# Patient Record
Sex: MALE | Race: BLACK OR AFRICAN AMERICAN | NOT HISPANIC OR LATINO | Employment: OTHER | URBAN - METROPOLITAN AREA
[De-identification: names, ages, dates, MRNs, and addresses within clinical notes are randomized per-mention and may not be internally consistent; named-entity substitution may affect disease eponyms.]

---

## 2017-01-04 DIAGNOSIS — I10 ESSENTIAL HYPERTENSION WITH GOAL BLOOD PRESSURE LESS THAN 140/90: ICD-10-CM

## 2017-01-04 DIAGNOSIS — E11.9 TYPE 2 DIABETES MELLITUS WITHOUT COMPLICATION, WITHOUT LONG-TERM CURRENT USE OF INSULIN (H): Primary | ICD-10-CM

## 2017-01-10 NOTE — TELEPHONE ENCOUNTER
Patient is scheduled to rtc 2/24/17, please advise on jareth refill as she would require more than 30 days to get her to her appointment.     EVELINE Hickey, Clinical RN [place].

## 2017-01-11 RX ORDER — METFORMIN HCL 500 MG
1000 TABLET, EXTENDED RELEASE 24 HR ORAL 2 TIMES DAILY WITH MEALS
Qty: 360 TABLET | Refills: 0 | Status: SHIPPED | OUTPATIENT
Start: 2017-01-11 | End: 2017-02-28

## 2017-01-11 RX ORDER — AMLODIPINE AND BENAZEPRIL HYDROCHLORIDE 5; 20 MG/1; MG/1
1 CAPSULE ORAL DAILY
Qty: 90 CAPSULE | Refills: 0 | Status: SHIPPED | OUTPATIENT
Start: 2017-01-11 | End: 2017-02-28

## 2017-02-24 ENCOUNTER — OFFICE VISIT (OUTPATIENT)
Dept: OPHTHALMOLOGY | Facility: CLINIC | Age: 57
End: 2017-02-24
Payer: COMMERCIAL

## 2017-02-24 DIAGNOSIS — H25.13 NUCLEAR SCLEROTIC CATARACT OF BOTH EYES: ICD-10-CM

## 2017-02-24 DIAGNOSIS — H40.1131 PRIMARY OPEN ANGLE GLAUCOMA OF BOTH EYES, MILD STAGE: Primary | ICD-10-CM

## 2017-02-24 PROCEDURE — 92083 EXTENDED VISUAL FIELD XM: CPT | Performed by: STUDENT IN AN ORGANIZED HEALTH CARE EDUCATION/TRAINING PROGRAM

## 2017-02-24 PROCEDURE — 92133 CPTRZD OPH DX IMG PST SGM ON: CPT | Performed by: STUDENT IN AN ORGANIZED HEALTH CARE EDUCATION/TRAINING PROGRAM

## 2017-02-24 PROCEDURE — 92012 INTRM OPH EXAM EST PATIENT: CPT | Performed by: STUDENT IN AN ORGANIZED HEALTH CARE EDUCATION/TRAINING PROGRAM

## 2017-02-24 ASSESSMENT — CUP TO DISC RATIO
OD_RATIO: 0.65 UD
OS_RATIO: 0.7 UD

## 2017-02-24 ASSESSMENT — VISUAL ACUITY
METHOD: SNELLEN - LINEAR
OD_PH_CC: 20/30
OD_CC: 20/50-2
OS_CC: 20/40-1
OD_BAT_HIGH: 20/50
OS_BAT_HIGH: 20/40
OS_PH_CC: 20/30+1

## 2017-02-24 ASSESSMENT — TONOMETRY
OD_IOP_MMHG: 12
IOP_METHOD: ICARE
OS_IOP_MMHG: 11

## 2017-02-24 ASSESSMENT — SLIT LAMP EXAM - LIDS
COMMENTS: NORMAL
COMMENTS: NORMAL

## 2017-02-24 ASSESSMENT — EXTERNAL EXAM - LEFT EYE: OS_EXAM: NORMAL

## 2017-02-24 ASSESSMENT — EXTERNAL EXAM - RIGHT EYE: OD_EXAM: NORMAL

## 2017-02-24 ASSESSMENT — PACHYMETRY
OS_CT(UM): 558
OD_CT(UM): 596

## 2017-02-24 NOTE — PROGRESS NOTES
Current Eye Medications:  Latanoprost nightly both eyes, last drops last night    Subjective:  3 mo glaucoma iop with oct and hvf.  Pt reports that his vision seems a little blurrier. Blurry vision is not bothersome to him. Concerned about his glaucoma - he had a cousin that went blind from glaucoma.     Objective:  See Ophthalmology Exam.       Assessment:  Ren Farrar is a 56 year old male who presents with:   Encounter Diagnoses   Name Primary?     Primary open angle glaucoma of both eyes, mild stage OCT stable both eyes.  Segal visual field (HVF) within normal limits right eye; mild, scattered, non-specific loss left eye  Intraocular pressure great on latanoprost.       Nuclear sclerotic cataract of both eyes Early visually significant. Monitor.       Plan:  Continue same medications    Katherine Danielson MD  [phone]

## 2017-02-24 NOTE — MR AVS SNAPSHOT
After Visit Summary   2/24/2017    Ren Farrar    MRN: [id number]           Patient Information     Date Of Birth          1960        Visit Information        Provider Department      2/24/2017 1:15 PM Katherine Danielson MD [facility]        Today's Diagnoses     Primary open angle glaucoma of both eyes, mild stage    -  1    Nuclear sclerotic cataract of both eyes          Care Instructions    Continue same medications    Katherine Danielson MD  [phone]          Follow-ups after your visit        Follow-up notes from your care team     Return in about 9 months (around 11/24/2017) for Complete Exam.      Your next 10 appointments already scheduled     Feb 28, 2017  9:00 AM CST   [facility]rey [facility]    [address] [place] [id number] [phone]           Bring a current list of meds and any records pertaining to this visit.  For Physicals, please bring immunization records and any forms needing to be filled out.  Please arrive 10 minutes early to complete paperwork.              Who to contact     If you have questions or need follow up information about today's clinic visit or your schedule please contact [facility] directly at [phone].  Normal or non-critical lab and imaging results will be communicated to you by MyChart, letter or phone within 4 business days after the clinic has received the results. If you do not hear from us within 7 days, please contact the clinic through MyChart or phone. If you have a critical or abnormal lab result, we will notify you by phone as soon as possible.  Submit refill requests through [facility] or call your pharmacy and they will forward the refill request to us. Please allow 3 business days for your refill to be completed.          Additional Information About Your Visit        MyChart Information     [employer]  gives you secure access to your electronic health record. If you see a primary care provider, you can also send messages to your care team and make appointments. If you have questions, please call your primary care clinic.  If you do not have a primary care provider, please call [phone] and they will assist you.        Care EveryWhere ID     This is your Care EveryWhere ID. This could be used by other organizations to access your [facility] medical records  [id number]         Blood Pressure from Last 3 Encounters:   04/07/16 132/80   11/10/15 124/86   06/12/15 130/84    Weight from Last 3 Encounters:   04/07/16 106.6 kg (235 lb)   11/10/15 105.7 kg (233 lb)   06/12/15 103.6 kg (228 lb 6.4 oz)              Today, you had the following     No orders found for display       Primary Care Provider Office Phone # Fax #    Humberto Humphrey [phone]       [facility] [address] [facility] [address]        Thank you!     Thank you for choosing [facility]  for your care. Our goal is always to provide you with excellent care. Hearing back from our patients is one way we can continue to improve our services. Please take a few minutes to complete the written survey that you may receive in the mail after your visit with us. Thank you!             Your Updated Medication List - Protect others around you: Learn how to safely use, store and throw away your medicines at www.disposemymeds.org.          This list is accurate as of: 2/24/17  2:24 PM.  Always use your most recent med list.                   Brand Name Dispense Instructions for use    ACCU-CHEK COMPACT GLUCOSE CONT Liqd     1 Bottle    Use calibration fluid as directed by .       amLODIPine-benazepril 5-20 MG per capsule    LOTREL    90 capsule    Take 1 capsule by mouth daily for blood pressure.       aspirin 81 MG tablet      1 TABLET DAILY*       blood glucose monitoring lancets     200 each    1  Device by * route 2 times daily       diazepam 2 MG tablet    VALIUM    40 tablet    Take 1 tablet (2 mg) by mouth every 8 hours as needed for muscle spasms       HYDROcodone-acetaminophen 5-325 MG per tablet    NORCO    40 tablet    Take 1 tablet by mouth every 8 hours as needed for moderate to severe pain (neck or arm)       ibuprofen 200 MG tablet    ADVIL/MOTRIN     Take 3 tablets by mouth 3 times daily as needed for pain (take with food).       latanoprost 0.005 % ophthalmic solution    XALATAN    1 Bottle    Place 1 drop into both eyes At Bedtime       metFORMIN 500 MG 24 hr tablet    GLUCOPHAGE-XR    360 tablet    Take 2 tablets (1,000 mg) by mouth 2 times daily (with meals) for diabetes.       niacin 500 MG CR tablet    NIASPAN    360 tablet    Take 4 tablets (2,000 mg) by mouth At Bedtime for cholesterol. Take with low-fat snack.       * order for DME     6 Container    accu-check compact test drum for testing 2 times a day.       * STATIN NOT PRESCRIBED (INTENTIONAL)      Causes muscle aches       vardenafil 20 MG tablet    LEVITRA    30 tablet    Take 0.5-1 tablets (10-20 mg) by mouth daily as needed for erectile dysfunction       * Notice:  This list has 2 medication(s) that are the same as other medications prescribed for you. Read the directions carefully, and ask your doctor or other care provider to review them with you.

## 2017-02-28 ENCOUNTER — OFFICE VISIT (OUTPATIENT)
Dept: FAMILY MEDICINE | Facility: CLINIC | Age: 57
End: 2017-02-28
Payer: COMMERCIAL

## 2017-02-28 VITALS
BODY MASS INDEX: 31.56 KG/M2 | TEMPERATURE: 99 F | HEIGHT: 72 IN | WEIGHT: 233 LBS | HEART RATE: 79 BPM | DIASTOLIC BLOOD PRESSURE: 73 MMHG | SYSTOLIC BLOOD PRESSURE: 121 MMHG | OXYGEN SATURATION: 97 %

## 2017-02-28 DIAGNOSIS — I10 ESSENTIAL HYPERTENSION WITH GOAL BLOOD PRESSURE LESS THAN 140/90: ICD-10-CM

## 2017-02-28 DIAGNOSIS — E11.9 TYPE 2 DIABETES MELLITUS WITHOUT COMPLICATION, WITHOUT LONG-TERM CURRENT USE OF INSULIN (H): Primary | ICD-10-CM

## 2017-02-28 DIAGNOSIS — M67.431 GANGLION OF WRIST, RIGHT: ICD-10-CM

## 2017-02-28 DIAGNOSIS — Z23 NEED FOR INFLUENZA VACCINATION: ICD-10-CM

## 2017-02-28 DIAGNOSIS — E78.5 HYPERLIPIDEMIA LDL GOAL <100: ICD-10-CM

## 2017-02-28 LAB
ALT SERPL W P-5'-P-CCNC: 21 U/L (ref 0–70)
ANION GAP SERPL CALCULATED.3IONS-SCNC: 8 MMOL/L (ref 3–14)
BUN SERPL-MCNC: 10 MG/DL (ref 7–30)
CALCIUM SERPL-MCNC: 8.9 MG/DL (ref 8.5–10.1)
CHLORIDE SERPL-SCNC: 104 MMOL/L (ref 94–109)
CHOLEST SERPL-MCNC: 161 MG/DL
CO2 SERPL-SCNC: 26 MMOL/L (ref 20–32)
CREAT SERPL-MCNC: 0.94 MG/DL (ref 0.66–1.25)
CREAT UR-MCNC: 204 MG/DL
GFR SERPL CREATININE-BSD FRML MDRD: 82 ML/MIN/1.7M2
GLUCOSE SERPL-MCNC: 177 MG/DL (ref 70–99)
HBA1C MFR BLD: 7.8 % (ref 4.3–6)
HDLC SERPL-MCNC: 72 MG/DL
LDLC SERPL CALC-MCNC: 78 MG/DL
MICROALBUMIN UR-MCNC: 9 MG/L
MICROALBUMIN/CREAT UR: 4.64 MG/G CR (ref 0–17)
NONHDLC SERPL-MCNC: 89 MG/DL
POTASSIUM SERPL-SCNC: 3.9 MMOL/L (ref 3.4–5.3)
SODIUM SERPL-SCNC: 138 MMOL/L (ref 133–144)
TRIGL SERPL-MCNC: 54 MG/DL

## 2017-02-28 PROCEDURE — 82043 UR ALBUMIN QUANTITATIVE: CPT | Performed by: FAMILY MEDICINE

## 2017-02-28 PROCEDURE — 90471 IMMUNIZATION ADMIN: CPT | Performed by: FAMILY MEDICINE

## 2017-02-28 PROCEDURE — 36415 COLL VENOUS BLD VENIPUNCTURE: CPT | Performed by: FAMILY MEDICINE

## 2017-02-28 PROCEDURE — 80048 BASIC METABOLIC PNL TOTAL CA: CPT | Performed by: FAMILY MEDICINE

## 2017-02-28 PROCEDURE — 99207 C FOOT EXAM  NO CHARGE: CPT | Performed by: FAMILY MEDICINE

## 2017-02-28 PROCEDURE — 83036 HEMOGLOBIN GLYCOSYLATED A1C: CPT | Performed by: FAMILY MEDICINE

## 2017-02-28 PROCEDURE — 80061 LIPID PANEL: CPT | Performed by: FAMILY MEDICINE

## 2017-02-28 PROCEDURE — 84460 ALANINE AMINO (ALT) (SGPT): CPT | Performed by: FAMILY MEDICINE

## 2017-02-28 PROCEDURE — 99214 OFFICE O/P EST MOD 30 MIN: CPT | Mod: 25 | Performed by: FAMILY MEDICINE

## 2017-02-28 PROCEDURE — 90686 IIV4 VACC NO PRSV 0.5 ML IM: CPT | Performed by: FAMILY MEDICINE

## 2017-02-28 RX ORDER — NIACIN 500 MG/1
2000 TABLET, EXTENDED RELEASE ORAL AT BEDTIME
Qty: 360 TABLET | Refills: 1 | Status: SHIPPED | OUTPATIENT
Start: 2017-02-28 | End: 2017-10-12

## 2017-02-28 RX ORDER — AMLODIPINE AND BENAZEPRIL HYDROCHLORIDE 5; 20 MG/1; MG/1
1 CAPSULE ORAL DAILY
Qty: 90 CAPSULE | Refills: 1 | Status: SHIPPED | OUTPATIENT
Start: 2017-02-28 | End: 2017-05-04

## 2017-02-28 RX ORDER — METFORMIN HCL 500 MG
1000 TABLET, EXTENDED RELEASE 24 HR ORAL 2 TIMES DAILY WITH MEALS
Qty: 360 TABLET | Refills: 1 | Status: SHIPPED | OUTPATIENT
Start: 2017-02-28 | End: 2017-05-04

## 2017-02-28 ASSESSMENT — PAIN SCALES - GENERAL: PAINLEVEL: NO PAIN (0)

## 2017-02-28 NOTE — PROGRESS NOTES
SUBJECTIVE:                                                    Ren Farrar is a 56 year old male who presents to clinic today for the following health issues:    Diabetes Follow-up    Patient is checking blood sugars: once daily (in the AM, and then sometimes checks again after a meal later in the day).  Results are as follows:         am - 100-130         After meals = about 149    Diabetic concerns: None     Symptoms of hypoglycemia (low blood sugar): none     Paresthesias (numbness or burning in feet) or sores: No     Date of last diabetic eye exam: 11/21/16     Hyperlipidemia Follow-Up      Rate your low fat/cholesterol diet?: fair    Taking statin?  No    Other lipid medications/supplements?:  Niacin, without side effects     Hypertension Follow-up      Outpatient blood pressures are being checked at home.  Results are good.    Low Salt Diet: not monitoring salt         Amount of exercise or physical activity: None    Problems taking medications regularly: No    Medication side effects: none    Diet: low fat/cholesterol      Past medical, family, and social histories, medications, and allergies are reviewed and updated in Epic.       ROS:  M/S: For the past 6 months, patient reports a bump on his right wrist that affects the movement of his wrist. He reports wrist weakness but denies pain.   Constitutional, HEENT, cardiovascular, pulmonary, gi and gu systems are negative, except as otherwise noted.    Any history above obtained by the Medical Assistant was reviewed by Dr. Humberto Humphrey MD, and edited when necessary.    This document serves as a record of the services and decisions personally performed and made by Dr. Humphrey. It was created on his behalf by Krystle Hills, a trained medical scribe. The creation of this document is based on the provider's statements to the medical scribe.  Krystle Hills February 28, 2017 9:35 AM   OBJECTIVE:                                                    /73 (BP  Location: Left arm, Patient Position: Chair, Cuff Size: Adult Large)  Pulse 79  Temp 99  F (37.2  C) (Oral)  Ht 6' (1.829 m)  Wt 233 lb (105.7 kg)  SpO2 97%  BMI 31.6 kg/m2  Body mass index is 31.6 kg/(m^2).  GENERAL: healthy, alert and no distress  EYES: Eyes grossly normal to inspection, PERRL and conjunctivae and sclerae normal  RESP: lungs clear to auscultation - no rales, rhonchi or wheezes  CV: regular rate and rhythm, normal S1 S2, no S3 or S4, no murmur, click or rub, no peripheral edema and peripheral pulses strong  MS: Subcutaneous mass of the medial (ulnar) side of the right wrist. It palpates consistent with a ganglion. It is not tender.   SKIN: no suspicious lesions or rashes  NEURO: Normal strength and tone, mentation intact and speech normal, cranial nerves 2-12 intact   PSYCH: mentation appears normal, affect normal/bright  Diabetic foot exam: normal DP and PT pulses, no trophic changes or ulcerative lesions, normal sensory exam and normal monofilament exam      Diagnostic Test Results:  Results for orders placed or performed in visit on 02/28/17 (from the past 24 hour(s))   Hemoglobin A1c   Result Value Ref Range    Hemoglobin A1C 7.8 (H) 4.3 - 6.0 %     Lab Results   Component Value Date    A1C 7.8 02/28/2017    A1C 7.5 04/07/2016    A1C 6.9 11/10/2015    A1C 6.9 12/16/2014    A1C 7.0 05/20/2014        ASSESSMENT/PLAN:                                                    (E11.9) Type 2 diabetes mellitus without complication, without long-term current use of insulin (H)  (primary encounter diagnosis)  Comment: His diabetes control is not at goal and is actually worse since last visit. The patient has not done much glucose checking besides AM fasting. He would like to work on lifestyle changes more now before starting a second diabetes med, so he wants to put that off until next visit.   Plan: Hemoglobin A1c, Albumin Random Urine         Quantitative, FOOT EXAM, metFORMIN         (GLUCOPHAGE-XR) 500 MG  24 hr tablet        Follow up in 6 months at [facility]. HgbA1c graph provided. Weight graph provided.     (I10) Essential hypertension with goal blood pressure less than 140/90  Comment: well controlled  Plan: Basic metabolic panel, amLODIPine-benazepril         (LOTREL) 5-20 MG per capsule        Follow up in 6 months     (E78.5) Hyperlipidemia LDL goal <100  Comment: fasting. Historically at goal  Plan: Lipid panel reflex to direct LDL, ALT, niacin         (NIASPAN) 500 MG CR tablet        Follow up in 6 months       (M67.431) Ganglion of wrist, right  Comment:   Plan: ORTHO  REFERRAL        Handouts provided.     (Z23) Need for influenza vaccination  Comment: Influenza vaccine offered and accepted by patient. He has received it before without problems.   Plan: HC FLU VAC PRESRV FREE QUAD SPLIT VIR 3+YRS IM              The information in this document, created by the medical scribe for me, accurately reflects the services I personally performed and the decisions made by me. I have reviewed and approved this document for accuracy prior to leaving the patient care area.  Humberto Humphrey MD                              Injectable Influenza Immunization Documentation    1.  Is the person to be vaccinated sick today?  No    2. Does the person to be vaccinated have an allergy to eggs or to a component of the vaccine?  No    3. Has the person to be vaccinated today ever had a serious reaction to influenza vaccine in the past?  No    4. Has the person to be vaccinated ever had Guillain-Austin syndrome?  No     Form completed by GENOVEVA Grijalva MA

## 2017-02-28 NOTE — PATIENT INSTRUCTIONS
================================================================================  Normal Values   Blood pressure  <140/90 for most adults    <130/80 for some chronic diseases (ask your care team about yours)    BMI (body mass index)  18.5-25 kg/m2 (based on height and weight)     Thank you for visiting [facility]    Normal or non-critical lab and imaging results will be communicated to you by MyChart, letter or phone within 7 days.  If you do not hear from us within 10 days, please call the clinic. If you have a critical or abnormal lab result, we will notify you by phone as soon as possible.     If you have any questions regarding your visit please contact:     Team Comfort:   Clinic Hours Telephone Number   Dr. Humberto Cherry 7am-5pm  Monday - Friday [phone]  Yany Shipman RN   Pharmacy 8:00am-8pm Monday-Friday    9am-5pm Saturday-Sunday [phone]   Urgent Care 11am-9pm Monday-Friday        9am-5pm Saturday-Sunday [phone]     After hours, weekend or if you need to make an appointment with your primary provider please call [phone].   After Hours nurse advise: call [facility]: [phone]    Medication Refills:  Call your pharmacy and they will forward the refill to us. Please allow 3 business days for your refills to be completed.          Treating Ganglia  A ganglion is a swelling (cyst) that forms on joint and tendon sheaths. They are most often found in the wrist. But they can also show up on the foot, fingers, or toes. Ganglia are believed to be caused by a rupture of the tissue that lines the joints and tendon sheaths (synovial tissue).   Ganglia are often difficult to treat without surgery but nonsurgical methods may help relieve some of your symptoms.  Nonsurgical care  Nonsurgical methods include:     Pads placed around the ganglion can ease pressure and  friction.    Removing the fluid may also relieve symptoms, though ganglia may come back. The gelatinous gel is removed through a large-bore needle. You may get a steroid injection after the cyst fluid is removed.    Limiting movements or activities that increase pain may bring relief.    Icing the ganglion for 15 to 20 minutes may temporarily relieve inflammation and pain.    If your inflammation is severe, your healthcare provider may treat your symptoms with medicine.    Surgery  If a ganglion is causing ongoing or severe pain, your healthcare provider may recommend surgery. Your surgeon removes the entire ganglion wall during the procedure. He or she may also remove some surrounding tissue. If the ganglion has come through a tear in the capsule of the joint, the tear will have to be repaired. The joint will likely be get still while the tear heals.  After surgery  You may feel pain, swelling, numbness, or tingling for several weeks after surgery. Be sure to see your healthcare provider if you notice any problems in the future. Although surgery is usually successful, there is a chance that the ganglion will come back.    3662-9180 [employer]. [address] [place] [address]. All rights reserved. This information is not intended as a substitute for professional medical care. Always follow your healthcare professional's instructions.        Ganglion Cyst: Hand  A ganglion cyst is a firm, fluid-filled lump that can suddenly appear on the front or back of the wrist or at the base of a finger. These cysts grow from normal tissue in the wrist and fingers, and range in size from a pea to a peach pit. Although ganglion cysts are common, they don t spread, and they don t become cancerous. They can occur after an injury, but many times it isn t known why they grow. Ganglion cysts can change in size, and may go away on their own.  Symptoms  A ganglion cyst is sometimes painful, especially when it  first occurs. Constantly using your hand or wrist can make the cyst enlarge and hurt more. Some hand and wrist movements, such as grasping things, may also be difficult.  How a ganglion cyst develops  Your wrist and hand are made up of many small bones that meet at joints. Tendons attach muscles to the bones at the joints. The tendons allow the joints to bend and straighten. Both tendons and joints are lined with tissue called synovium. This tissue makes a thick fluid that keeps the joints and tendons moving easily. Sometimes the tissue balloons out from the joint or tendons and forms a cyst. As the cyst fills with fluid and grows, it appears as a lump you can feel.  Where ganglion cysts occur  A ganglion cyst can occur anywhere on the hand near a joint. Cysts most commonly appear on the back or palm side of the wrist, or on the palm at the base of a finger. Your doctor can usually diagnose a cyst by examining the lump. He or she may draw off a little fluid or order an X-ray to rule out other problems.  Treating a ganglion cyst  Your healthcare provider may just watch your ganglion cyst. Many shrink and become painless without treatment. Some disappear altogether. If the cyst is unsightly or painful, or makes it hard for you to use your hand, your healthcare provider can treat it or, if needed, remove it surgically.    Nonsurgical treatment  To shrink the cyst, your provider may remove (aspirate) the fluid with a needle. If the cyst hurts, your provider may also give you an injection of an anti-inflammatory, such as cortisone, to relieve the irritation. Your hand may then be wrapped to help keep the cyst from recurring.  Surgery  If the cyst reappears after treatment, your healthcare provider may remove it surgically. A section of the tissue that lines the joint or tendon is removed along with the cyst. This helps prevent another cyst from forming, although recurrence of the cyst is still possible after  surgery. Usually, only your hand or arm is numbed, and you can go home a few hours after surgery. Your hand may be in a splint for several days.    4012-0388 [employer]. [address] [place] [address], [place] [address]. All rights reserved. This information is not intended as a substitute for professional medical care. Always follow your healthcare professional's instructions.

## 2017-02-28 NOTE — NURSING NOTE
Chief Complaint   Patient presents with     Hypertension     Diabetes     Lipids       Initial /73 (BP Location: Left arm, Patient Position: Chair, Cuff Size: Adult Large)  Pulse 79  Temp 99  F (37.2  C) (Oral)  Ht 6' (1.829 m)  Wt 233 lb (105.7 kg)  SpO2 97%  BMI 31.6 kg/m2 Estimated body mass index is 31.6 kg/(m^2) as calculated from the following:    Height as of this encounter: 6' (1.829 m).    Weight as of this encounter: 233 lb (105.7 kg).  Medication Reconciliation: satish Grijalva MA

## 2017-02-28 NOTE — MR AVS SNAPSHOT
After Visit Summary   2/28/2017    Ren Farrar    MRN: [id number]           Patient Information     Date Of Birth          1960        Visit Information        Provider Department      2/28/2017 9:00 AM Humberto Humphrey MD [facility]        Today's Diagnoses     Type 2 diabetes mellitus without complication, without long-term current use of insulin (H)    -  1    Essential hypertension with goal blood pressure less than 140/90        Hyperlipidemia LDL goal <100        Need for influenza vaccination          Care Instructions        ================================================================================  Normal Values   Blood pressure  <140/90 for most adults    <130/80 for some chronic diseases (ask your care team about yours)    BMI (body mass index)  18.5-25 kg/m2 (based on height and weight)     Thank you for visiting [facility]    Normal or non-critical lab and imaging results will be communicated to you by MyChart, letter or phone within 7 days.  If you do not hear from us within 10 days, please call the clinic. If you have a critical or abnormal lab result, we will notify you by phone as soon as possible.     If you have any questions regarding your visit please contact:     Team Comfort:   Clinic Hours Telephone Number   Dr. Humberto Cherry 7am-5pm  Monday - Friday [phone]  Yany Shipman RN   Pharmacy 8:00am-8pm Monday-Friday    9am-5pm Saturday-Sunday [phone]   Urgent Care 11am-9pm Monday-Friday        9am-5pm Saturday-Sunday [phone]     After hours, weekend or if you need to make an appointment with your primary provider please call [phone].   After Hours nurse advise: call [place] [facility]: [phone]    Medication Refills:  Call your pharmacy and they will forward the refill to us. Please allow 3 business days for your refills  to be completed.                Follow-ups after your visit        Follow-up notes from your care team     Return in about 6 months (around 8/24/2017) for full physical, diabetes.      Your next 10 appointments already scheduled     Nov 29, 2017  1:30 PM CST   New Visit with Katherine Danielson MD   [facility] [place] ([facility])    [address]  [place] [id number] [phone]              Who to contact     If you have questions or need follow up information about today's clinic visit or your schedule please contact [facility] directly at [phone].  Normal or non-critical lab and imaging results will be communicated to you by MyChart, letter or phone within 4 business days after the clinic has received the results. If you do not hear from us within 7 days, please contact the clinic through [employer] or phone. If you have a critical or abnormal lab result, we will notify you by phone as soon as possible.  Submit refill requests through [employer] or call your pharmacy and they will forward the refill request to us. Please allow 3 business days for your refill to be completed.          Additional Information About Your Visit        [employer] Information     [employer] gives you secure access to your electronic health record. If you see a primary care provider, you can also send messages to your care team and make appointments. If you have questions, please call your primary care clinic.  If you do not have a primary care provider, please call [phone] and they will assist you.        Care EveryWhere ID     This is your Care EveryWhere ID. This could be used by other organizations to access your [place] medical records  [id number]        Your Vitals Were     Pulse Temperature Height Pulse Oximetry BMI (Body Mass Index)       79 99  F (37.2  C) (Oral) 6' (1.829 m) 97% 31.6 kg/m2        Blood Pressure from Last 3 Encounters:   02/28/17 121/73   04/07/16 132/80    11/10/15 124/86    Weight from Last 3 Encounters:   02/28/17 233 lb (105.7 kg)   04/07/16 235 lb (106.6 kg)   11/10/15 233 lb (105.7 kg)              We Performed the Following     Albumin Random Urine Quantitative     ALT     Basic metabolic panel     FOOT EXAM     HC FLU VAC PRESRV FREE QUAD SPLIT VIR 3+YRS IM     Hemoglobin A1c     Lipid panel reflex to direct LDL          Where to get your medicines      These medications were sent to [facility] [address] [address]     Phone:  [phone]     amLODIPine-benazepril 5-20 MG per capsule    metFORMIN 500 MG 24 hr tablet    niacin 500 MG CR tablet          Primary Care Provider Office Phone # Fax #    Humberto Humphrey [phone]       [facility] [address]  [facility] [address]        Thank you!     Thank you for choosing [facility]  for your care. Our goal is always to provide you with excellent care. Hearing back from our patients is one way we can continue to improve our services. Please take a few minutes to complete the written survey that you may receive in the mail after your visit with us. Thank you!             Your Updated Medication List - Protect others around you: Learn how to safely use, store and throw away your medicines at www.disposemymeds.org.          This list is accurate as of: 2/28/17  9:54 AM.  Always use your most recent med list.                   Brand Name Dispense Instructions for use    amLODIPine-benazepril 5-20 MG per capsule    LOTREL    90 capsule    Take 1 capsule by mouth daily for blood pressure.       aspirin 81 MG tablet      1 TABLET DAILY*       latanoprost 0.005 % ophthalmic solution    XALATAN    1 Bottle    Place 1 drop into both eyes At Bedtime       metFORMIN 500 MG 24 hr tablet    GLUCOPHAGE-XR    360 tablet    Take 2 tablets (1,000 mg) by mouth 2 times daily (with meals)  for diabetes.       niacin 500 MG CR tablet    NIASPAN    360 tablet    Take 4 tablets (2,000 mg) by mouth At Bedtime for cholesterol. Take with low-fat snack.       STATIN NOT PRESCRIBED (INTENTIONAL)      Causes muscle aches

## 2017-04-20 ENCOUNTER — OFFICE VISIT (OUTPATIENT)
Dept: FAMILY MEDICINE | Facility: CLINIC | Age: 57
End: 2017-04-20
Payer: COMMERCIAL

## 2017-04-20 VITALS
HEIGHT: 72 IN | WEIGHT: 236 LBS | TEMPERATURE: 98.5 F | BODY MASS INDEX: 31.97 KG/M2 | SYSTOLIC BLOOD PRESSURE: 131 MMHG | DIASTOLIC BLOOD PRESSURE: 81 MMHG | OXYGEN SATURATION: 97 % | HEART RATE: 89 BPM

## 2017-04-20 DIAGNOSIS — M19.079 ARTHRITIS OF FIRST MTP JOINT: Primary | ICD-10-CM

## 2017-04-20 LAB — URATE SERPL-MCNC: 5.6 MG/DL (ref 3.5–7.2)

## 2017-04-20 PROCEDURE — 99213 OFFICE O/P EST LOW 20 MIN: CPT | Performed by: FAMILY MEDICINE

## 2017-04-20 PROCEDURE — 84550 ASSAY OF BLOOD/URIC ACID: CPT | Performed by: FAMILY MEDICINE

## 2017-04-20 PROCEDURE — 36415 COLL VENOUS BLD VENIPUNCTURE: CPT | Performed by: FAMILY MEDICINE

## 2017-04-20 ASSESSMENT — PAIN SCALES - GENERAL: PAINLEVEL: MILD PAIN (2)

## 2017-04-20 NOTE — NURSING NOTE
Chief Complaint   Patient presents with     Toe Pain       Initial /81 (BP Location: Left arm, Patient Position: Chair, Cuff Size: Adult Small)  Pulse 89  Temp 98.5  F (36.9  C) (Oral)  Ht 6' (1.829 m)  Wt 236 lb (107 kg)  SpO2 97%  BMI 32.01 kg/m2 Estimated body mass index is 32.01 kg/(m^2) as calculated from the following:    Height as of this encounter: 6' (1.829 m).    Weight as of this encounter: 236 lb (107 kg).  Medication Reconciliation: satish Grijalva MA

## 2017-04-20 NOTE — PATIENT INSTRUCTIONS
================================================================================  Normal Values   Blood pressure  <140/90 for most adults    <130/80 for some chronic diseases (ask your care team about yours)    BMI (body mass index)  18.5-25 kg/m2 (based on height and weight)     Thank you for visiting [facility]    Normal or non-critical lab and imaging results will be communicated to you by MyChart, letter or phone within 7 days.  If you do not hear from us within 10 days, please call the clinic. If you have a critical or abnormal lab result, we will notify you by phone as soon as possible.     If you have any questions regarding your visit please contact:     Team Comfort:   Clinic Hours Telephone Number   Dr. Humberto Cherry 7am-5pm  Monday - Friday [phone]  Yany Shipman RN   Pharmacy 8:00am-8pm Monday-Friday    9am-5pm Saturday-Sunday [phone]   Urgent Care 11am-9pm Monday-Friday        9am-5pm Saturday-Sunday [phone]     After hours, weekend or if you need to make an appointment with your primary provider please call [phone].   After Hours nurse advise: call [place] [facility]: [phone]    Medication Refills:  Call your pharmacy and they will forward the refill to us. Please allow 3 business days for your refills to be completed.

## 2017-04-20 NOTE — PROGRESS NOTES
SUBJECTIVE:                                                    Ren Farrar is a 56 year old male who presents to clinic today for the following health issues:      Joint Pain     Onset: about 3 weeks, no injury    Description:   Location: left big toe at the joint  Character: Burning    Intensity: moderate    Progression of Symptoms: worse    Accompanying Signs & Symptoms:  -Worsened when walking     History:   Previous similar pain: no    -Patient reports family history of gout (mother).     Precipitating factors:   Trauma or overuse: no     Alleviating factors:  Improved by: nothing       Therapies Tried and outcome: Tylenol not helping      Past medical, family, and social histories, medications, and allergies are reviewed and updated in Epic.     ROS:  Constitutional, HEENT, cardiovascular, pulmonary, gi and gu systems are negative, except as otherwise noted.      Any history above obtained by the Medical Assistant was reviewed by Dr. Humberto Humphrey MD, and edited when necessary.    This document serves as a record of the services and decisions personally performed and made by Dr. Humphrey. It was created on his behalf by Krystle Hills, a trained medical scribe. The creation of this document is based on the provider's statements to the medical scribe.  Krystle Hills April 20, 2017 2:08 PM   OBJECTIVE:                                                    /81 (BP Location: Left arm, Patient Position: Chair, Cuff Size: Adult Small)  Pulse 89  Temp 98.5  F (36.9  C) (Oral)  Ht 6' (1.829 m)  Wt 236 lb (107 kg)  SpO2 97%  BMI 32.01 kg/m2  Body mass index is 32.01 kg/(m^2).  GENERAL: healthy, alert and no distress  EYES: Eyes grossly normal to inspection, PERRL and conjunctivae and sclerae normal  MS: no gross musculoskeletal defects noted, no edema. Specifically the left great MTP joint does not have any redness, warmth, swelling, tenderness, or pain with passive manipulation.   SKIN: no suspicious  lesions or rashes  NEURO: Normal strength and tone, mentation intact and speech normal, cranial nerves 2-12 intact   PSYCH: mentation appears normal, affect normal/bright      ASSESSMENT/PLAN:                                                    (M19.90) Arthritis of first MTP joint  (primary encounter diagnosis)  Comment: Rule out gout  Plan: Uric acid, ORTHO  REFERRAL, diclofenac        (VOLTAREN) 50 MG EC tablet        Follow up with Podiatry if symptoms worsen or fail to resolve by May.       Follow up in 4 months for KASSIDY and diabetes check.       The information in this document, created by the medical scribe for me, accurately reflects the services I personally performed and the decisions made by me. I have reviewed and approved this document for accuracy prior to leaving the patient care area.    Humberto Humphrey MD

## 2017-04-20 NOTE — MR AVS SNAPSHOT
After Visit Summary   4/20/2017    Ren Farrar    MRN: [id number]           Patient Information     Date Of Birth          1960        Visit Information        Provider Department      4/20/2017 1:40 PM Humberto Humphrey MD [facility]        Today's Diagnoses     Arthritis of first MTP joint    -  1      Care Instructions        ================================================================================  Normal Values   Blood pressure  <140/90 for most adults    <130/80 for some chronic diseases (ask your care team about yours)    BMI (body mass index)  18.5-25 kg/m2 (based on height and weight)     Thank you for visiting [facility]    Normal or non-critical lab and imaging results will be communicated to you by MyChart, letter or phone within 7 days.  If you do not hear from us within 10 days, please call the clinic. If you have a critical or abnormal lab result, we will notify you by phone as soon as possible.     If you have any questions regarding your visit please contact:     Team Comfort:   Clinic Hours Telephone Number   Dr. Humberto Cehrry 7am-5pm  Monday - Friday [phone]  Yany Shipman RN   Pharmacy 8:00am-8pm Monday-Friday    9am-5pm Saturday-Sunday [phone]   Urgent Care 11am-9pm Monday-Friday        9am-5pm Saturday-Sunday [phone]     After hours, weekend or if you need to make an appointment with your primary provider please call [phone].   After Hours nurse advise: call [place] [facility]: [phone]    Medication Refills:  Call your pharmacy and they will forward the refill to us. Please allow 3 business days for your refills to be completed.                Follow-ups after your visit        Additional Services     ORTHO  REFERRAL       [facility] is referring you to the Orthopedic  Services at  [facility].       The  Representative will assist you in the coordination of your Orthopedic and Musculoskeletal Care as prescribed by your physician.    The  Representative will call you within 1 business day to help schedule your appointment, or you may contact the  Representative at:    All areas ~ [phone]     Type of Referral : [facility]       Timeframe requested: Routine (May or later)    Coverage of these services is subject to the terms and limitations of your health insurance plan.  Please call member services at your health plan with any benefit or coverage questions.      If X-rays, CT or MRI's have been performed, please contact the facility where they were done to arrange for , prior to your scheduled appointment.  Please bring this referral request to your appointment and present it to your specialist.                  Follow-up notes from your care team     Return in about 4 months (around 8/24/2017) for full physical, diabetes.      Your next 10 appointments already scheduled     Nov 29, 2017  1:30 PM CST   New Visit with Katherine Danielson MD   [facility] ([facility])    [address] [place] [id number] [phone]              Who to contact     If you have questions or need follow up information about today's clinic visit or your schedule please contact [facility] directly at [phone].  Normal or non-critical lab and imaging results will be communicated to you by MyChart, letter or phone within 4 business days after the clinic has received the results. If you do not hear from us within 7 days, please contact the clinic through MyChart or phone. If you have a critical or abnormal lab result, we will notify you by phone as soon as possible.  Submit refill requests through [employer] or call your pharmacy and they will forward the refill  request to us. Please allow 3 business days for your refill to be completed.          Additional Information About Your Visit        [employer] Information     [employer] gives you secure access to your electronic health record. If you see a primary care provider, you can also send messages to your care team and make appointments. If you have questions, please call your primary care clinic.  If you do not have a primary care provider, please call [phone] and they will assist you.        Care EveryWhere ID     This is your Care EveryWhere ID. This could be used by other organizations to access your [place] medical records  [id number]        Your Vitals Were     Pulse Temperature Height Pulse Oximetry BMI (Body Mass Index)       89 98.5  F (36.9  C) (Oral) 6' (1.829 m) 97% 32.01 kg/m2        Blood Pressure from Last 3 Encounters:   04/20/17 131/81   02/28/17 121/73   04/07/16 132/80    Weight from Last 3 Encounters:   04/20/17 236 lb (107 kg)   02/28/17 233 lb (105.7 kg)   04/07/16 235 lb (106.6 kg)              We Performed the Following     ORTHO  REFERRAL     Uric acid          Today's Medication Changes          These changes are accurate as of: 4/20/17  2:16 PM.  If you have any questions, ask your nurse or doctor.               Start taking these medicines.        Dose/Directions    diclofenac 50 MG EC tablet   Commonly known as:  VOLTAREN   Used for:  Arthritis of first MTP joint   Started by:  Humberto Humphrey MD        Dose:  50 mg   Take 1 tablet (50 mg) by mouth 3 times daily as needed for moderate pain (toe) . Take with food.   Quantity:  60 tablet   Refills:  1            Where to get your medicines      These medications [place] [facility] [place] [place] [address]  [address], [facility] [address]     Phone:  [phone]     diclofenac 50 MG EC tablet                Primary Care Provider Office Phone # Fax #    Humberto Humphrey MD  [phone]       [facility] [address]  [facility] [address]        Thank you!     Thank you for choosing [facility]  for your care. Our goal is always to provide you with excellent care. Hearing back from our patients is one way we can continue to improve our services. Please take a few minutes to complete the written survey that you may receive in the mail after your visit with us. Thank you!             Your Updated Medication List - Protect others around you: Learn how to safely use, store and throw away your medicines at www.disposemymeds.org.          This list is accurate as of: 4/20/17  2:16 PM.  Always use your most recent med list.                   Brand Name Dispense Instructions for use    amLODIPine-benazepril 5-20 MG per capsule    LOTREL    90 capsule    Take 1 capsule by mouth daily for blood pressure.       aspirin 81 MG tablet      1 TABLET DAILY*       diclofenac 50 MG EC tablet    VOLTAREN    60 tablet    Take 1 tablet (50 mg) by mouth 3 times daily as needed for moderate pain (toe) . Take with food.       latanoprost 0.005 % ophthalmic solution    XALATAN    1 Bottle    Place 1 drop into both eyes At Bedtime       metFORMIN 500 MG 24 hr tablet    GLUCOPHAGE-XR    360 tablet    Take 2 tablets (1,000 mg) by mouth 2 times daily (with meals) for diabetes.       niacin 500 MG CR tablet    NIASPAN    360 tablet    Take 4 tablets (2,000 mg) by mouth At Bedtime for cholesterol. Take with low-fat snack.       STATIN NOT PRESCRIBED (INTENTIONAL)      Causes muscle aches

## 2017-04-26 ENCOUNTER — OFFICE VISIT (OUTPATIENT)
Dept: PODIATRY | Facility: CLINIC | Age: 57
End: 2017-04-26
Payer: COMMERCIAL

## 2017-04-26 ENCOUNTER — RADIANT APPOINTMENT (OUTPATIENT)
Dept: GENERAL RADIOLOGY | Facility: CLINIC | Age: 57
End: 2017-04-26
Attending: PODIATRIST
Payer: COMMERCIAL

## 2017-04-26 VITALS — BODY MASS INDEX: 32.01 KG/M2 | WEIGHT: 236 LBS | HEART RATE: 90 BPM | OXYGEN SATURATION: 98 %

## 2017-04-26 DIAGNOSIS — M25.80 SESAMOIDITIS: ICD-10-CM

## 2017-04-26 DIAGNOSIS — M79.672 LEFT FOOT PAIN: ICD-10-CM

## 2017-04-26 DIAGNOSIS — M21.6X9 PRONATION DEFORMITY OF ANKLE, ACQUIRED, UNSPECIFIED LATERALITY: ICD-10-CM

## 2017-04-26 DIAGNOSIS — M79.672 LEFT FOOT PAIN: Primary | ICD-10-CM

## 2017-04-26 PROCEDURE — 99203 OFFICE O/P NEW LOW 30 MIN: CPT | Performed by: PODIATRIST

## 2017-04-26 PROCEDURE — 73630 X-RAY EXAM OF FOOT: CPT | Mod: LT

## 2017-04-26 NOTE — NURSING NOTE
Chief Complaint   Patient presents with     Arthritis     left big toe, getting hard to walk at times       Initial Pulse 90  Wt 107 kg (236 lb)  SpO2 98%  BMI 32.01 kg/m2 Estimated body mass index is 32.01 kg/(m^2) as calculated from the following:    Height as of 4/20/17: 1.829 m (6').    Weight as of this encounter: 107 kg (236 lb).  Medication Reconciliation: complete

## 2017-04-26 NOTE — LETTER
4/26/2017       RE: Ren Farrar  [address]  [facility] [address]           Dear Colleague,    Thank you for referring your patient, Ren Farrar, to the [facility]. Please see a copy of my visit note below.    S:  Patient seen in consult from Dr. Humphrey and complains of left foot pain.  Points to plantar medial sesamoid bone.  Describes as a burning pain.  aggravated by activity and relieved by rest.  Has had this for 3 weeks.  No pain anywhere else on feet.  dress shoes at work and walking 1/2 the time.  Dress shoes old.  Goes barefoot around house.      ROS:  Denies edema, erythema, ecchymosis, numbness, or drainage.  Denies weakness or toe deformity.       Allergies   Allergen Reactions     Atorvastatin Calcium      Myalgias, increased CPK     Simvastatin      myalgias       Current Outpatient Prescriptions   Medication Sig Dispense Refill     diclofenac (VOLTAREN) 50 MG EC tablet Take 1 tablet (50 mg) by mouth 3 times daily as needed for moderate pain (toe) . Take with food. 60 tablet 1     niacin (NIASPAN) 500 MG CR tablet Take 4 tablets (2,000 mg) by mouth At Bedtime for cholesterol. Take with low-fat snack. 360 tablet 1     metFORMIN (GLUCOPHAGE-XR) 500 MG 24 hr tablet Take 2 tablets (1,000 mg) by mouth 2 times daily (with meals) for diabetes. 360 tablet 1     amLODIPine-benazepril (LOTREL) 5-20 MG per capsule Take 1 capsule by mouth daily for blood pressure. 90 capsule 1     latanoprost (XALATAN) 0.005 % ophthalmic solution Place 1 drop into both eyes At Bedtime 1 Bottle 12     STATIN NOT PRESCRIBED, INTENTIONAL, Causes muscle aches       ASPIRIN 81 MG PO TABS 1 TABLET DAILY*  prn       Patient Active Problem List   Diagnosis     DARIA (obstructive sleep apnea)     Type 2 diabetes mellitus without complication (H)     Hyperlipidemia LDL goal <100     Gastroesophageal reflux disease without esophagitis     Erectile dysfunction     Essential hypertension with goal blood  pressure less than 140/90     Pain in joint, shoulder region     Obesity, Class I, BMI 30-34.9     Osteoarthritis of left hip     Nuclear sclerotic cataract of both eyes     Advanced directives, counseling/discussion     Adhesive capsulitis     Shoulder impingement     OAG (open angle glaucoma)     Anemia       Past Medical History:   Diagnosis Date     Arthritis      Erectile dysfunction      Essential hypertension, benign      GERD (gastroesophageal reflux disease)      Glaucoma      Herpes zoster 09    RT T5 or T6     Hyperlipidemia LDL goal <100      DARIA (obstructive sleep apnea) 4/5/10    Dr. Ugo Roth, on CPAP     Posterior subcapsular cataract, bilateral 2013     Type II or unspecified type diabetes mellitus without mention of complication, not stated as uncontrolled 5/13/10       Past Surgical History:   Procedure Laterality Date     NO HISTORY OF SURGERY         Family History   Problem Relation Age of Onset     C.A.D. Mother      Myocardial Infarction Mother      Hypertension Mother      DIABETES Mother      borderline     Genitourinary Problems Mother      dialysis     Eye Disorder Mother      cataract     DIABETES Father      Hypertension Father      Heart Failure Father       CHF 81     DIABETES Sister      Thyroid Disease Sister      Depression Son 17     Asthma No family hx of      CEREBROVASCULAR DISEASE No family hx of      Breast Cancer No family hx of      Cancer - colorectal No family hx of      Prostate Cancer No family hx of      Allergies No family hx of      CANCER No family hx of      Glaucoma No family hx of      Macular Degeneration No family hx of        Social History   Substance Use Topics     Smoking status: Never Smoker     Smokeless tobacco: Never Used     Alcohol use Yes      Comment: occassioanally         O: Pulse 90  Wt 107 kg (236 lb)  SpO2 98%  BMI 32.01 kg/m2.  Patient good historian.  A&O X3.  Pulses DP, PT 2/4 b/l.  CRT < 3 seconds X 10 digits.   No edema or varicosities noted.  Sensation to light touch intact b/l.  Reflexes 2/4 b/l.  Skin has normal texture and turgor b/l.  Pronated arch with weightbearing.  No forefoot or rear foot deformities noted.  MS 5/5 all compartments.  Normal ROM all fore foot and rearfoot joints.  No equinus.  Pain under left medial sesamoid bone.  Negative Lachmans test.    No pain anywhere else.  No erythema, edema, ecchymosis, or subcutaneous masses noted.  X-rays normal     A:   Left medial sesamoiditis.        Bilateral pronation.      P:   X-rays taken today.  Discussed cause with patient.  Ice bid.  Instructed to wear stiff soles shoes at all times.   Dancers pad to offload this.  Rx for orthotics.   Avoid activities that bother this.  Good shoes at all times and I made suggestions.   RETURN TO CLINIC prn.  Thank you for allowing me participate in the care of this patient.        Jordan Taylor DPM, FACFAS      Again, thank you for allowing me to participate in the care of your patient.        Sincerely,              Jordan Taylor DPM

## 2017-04-26 NOTE — PROGRESS NOTES
S:  Patient seen in consult from Dr. Humphrey and complains of left foot pain.  Points to plantar medial sesamoid bone.  Describes as a burning pain.  aggravated by activity and relieved by rest.  Has had this for 3 weeks.  No pain anywhere else on feet.  dress shoes at work and walking 1/2 the time.  Dress shoes old.  Goes barefoot around house.      ROS:  Denies edema, erythema, ecchymosis, numbness, or drainage.  Denies weakness or toe deformity.       Allergies   Allergen Reactions     Atorvastatin Calcium      Myalgias, increased CPK     Simvastatin      myalgias       Current Outpatient Prescriptions   Medication Sig Dispense Refill     diclofenac (VOLTAREN) 50 MG EC tablet Take 1 tablet (50 mg) by mouth 3 times daily as needed for moderate pain (toe) . Take with food. 60 tablet 1     niacin (NIASPAN) 500 MG CR tablet Take 4 tablets (2,000 mg) by mouth At Bedtime for cholesterol. Take with low-fat snack. 360 tablet 1     metFORMIN (GLUCOPHAGE-XR) 500 MG 24 hr tablet Take 2 tablets (1,000 mg) by mouth 2 times daily (with meals) for diabetes. 360 tablet 1     amLODIPine-benazepril (LOTREL) 5-20 MG per capsule Take 1 capsule by mouth daily for blood pressure. 90 capsule 1     latanoprost (XALATAN) 0.005 % ophthalmic solution Place 1 drop into both eyes At Bedtime 1 Bottle 12     STATIN NOT PRESCRIBED, INTENTIONAL, Causes muscle aches       ASPIRIN 81 MG PO TABS 1 TABLET DAILY*  prn       Patient Active Problem List   Diagnosis     DARIA (obstructive sleep apnea)     Type 2 diabetes mellitus without complication (H)     Hyperlipidemia LDL goal <100     Gastroesophageal reflux disease without esophagitis     Erectile dysfunction     Essential hypertension with goal blood pressure less than 140/90     Pain in joint, shoulder region     Obesity, Class I, BMI 30-34.9     Osteoarthritis of left hip     Nuclear sclerotic cataract of both eyes     Advanced directives, counseling/discussion     Adhesive capsulitis     Shoulder  impingement     OAG (open angle glaucoma)     Anemia       Past Medical History:   Diagnosis Date     Arthritis      Erectile dysfunction      Essential hypertension, benign      GERD (gastroesophageal reflux disease)      Glaucoma      Herpes zoster 09    RT T5 or T6     Hyperlipidemia LDL goal <100      DARIA (obstructive sleep apnea) 4/5/10    Dr. Ugo Roth, on CPAP     Posterior subcapsular cataract, bilateral 2013     Type II or unspecified type diabetes mellitus without mention of complication, not stated as uncontrolled 5/13/10       Past Surgical History:   Procedure Laterality Date     NO HISTORY OF SURGERY         Family History   Problem Relation Age of Onset     C.A.D. Mother      Myocardial Infarction Mother      Hypertension Mother      DIABETES Mother      borderline     Genitourinary Problems Mother      dialysis     Eye Disorder Mother      cataract     DIABETES Father      Hypertension Father      Heart Failure Father       CHF 81     DIABETES Sister      Thyroid Disease Sister      Depression Son 17     Asthma No family hx of      CEREBROVASCULAR DISEASE No family hx of      Breast Cancer No family hx of      Cancer - colorectal No family hx of      Prostate Cancer No family hx of      Allergies No family hx of      CANCER No family hx of      Glaucoma No family hx of      Macular Degeneration No family hx of        Social History   Substance Use Topics     Smoking status: Never Smoker     Smokeless tobacco: Never Used     Alcohol use Yes      Comment: occassioanally         O: Pulse 90  Wt 107 kg (236 lb)  SpO2 98%  BMI 32.01 kg/m2.  Patient good historian.  A&O X3.  Pulses DP, PT 2/4 b/l.  CRT < 3 seconds X 10 digits.  No edema or varicosities noted.  Sensation to light touch intact b/l.  Reflexes 2/4 b/l.  Skin has normal texture and turgor b/l.  Pronated arch with weightbearing.  No forefoot or rear foot deformities noted.  MS 5/5 all compartments.  Normal ROM all  fore foot and rearfoot joints.  No equinus.  Pain under left medial sesamoid bone.  Negative Lachmans test.    No pain anywhere else.  No erythema, edema, ecchymosis, or subcutaneous masses noted.  X-rays normal     A:   Left medial sesamoiditis.        Bilateral pronation.      P:   X-rays taken today.  Discussed cause with patient.  Ice bid.  Instructed to wear stiff soles shoes at all times.   Dancers pad to offload this.  Rx for orthotics.   Avoid activities that bother this.  Good shoes at all times and I made suggestions.   RETURN TO CLINIC prn.  Thank you for allowing me participate in the care of this patient.        Jordan Taylor DPM, FACFAS

## 2017-04-26 NOTE — MR AVS SNAPSHOT
After Visit Summary   4/26/2017    Ren Farrar    MRN: [id number]           Patient Information     Date Of Birth          1960        Visit Information        Provider Department      4/26/2017 11:45 AM Jordan Taylor, SARA [facility]        Today's Diagnoses     Left foot pain    -  1    Sesamoiditis        Pronation deformity of ankle, acquired, unspecified laterality          Care Instructions    We wish you continued good healing. If you have any questions or concerns, please do not hesitate to contact us at [phone].      Please remember to call and schedule a follow up appointment if one was recommended at your earliest convenience.   PODIATRY CLINIC HOURS  TELEPHONE NUMBER    Dr. Jordan TOMASPGRAYSON [facility]    Clinics:  [facility]        Venus Vela MA  Medical Assistant  Tuesday 1PM-6PM  [place]  Wednesday 7AM-2PM  [place]  Thursday 10AM-6PM  [place]  Friday 7AM-345PM  [place]  Specialty schedulers:   [phone] to make an appointment with any Specialty Provider.        Urgent Care locations:    [facility] Monday-Friday 5 pm - 9 pm. Saturday-Sunday 9 am -5pm    Monday-Friday 11 am - 9 pm Saturday 9 am - 5 pm     Monday-Sunday 12 noon-8PM [phone]     If you need a medication refill, please contact us you may need lab work and/or a follow up visit prior to your refill (i.e. Antifungal medications).    [employer] (secure e-mail communication and access to your chart) to send a message or to make an appointment.    If MRI needed please call [facility] at [phone]        Weight management plan: Patient was referred to their PCP to discuss a diet and exercise plan.          Follow-ups after your visit        Your next 10 appointments already scheduled     Nov 29, 2017  1:30 PM CST   New Visit with  [facility]atherine [facility]son [facility]   [facility]    [address]  [place] [address] [phone]              Who to contact     If you have questions or need follow up information about today's clinic visit or your schedule please contact [facility] directly at [phone].  Normal or non-critical lab and imaging results will be communicated to you by MyChart, letter or phone within 4 business days after the clinic has received the results. If you do not hear from us within 7 days, please contact the clinic through [employer] or phone. If you have a critical or abnormal lab result, we will notify you by phone as soon as possible.  Submit refill requests through [employer] or call your pharmacy and they will forward the refill request to us. Please allow 3 business days for your refill to be completed.          Additional Information About Your Visit        [employer] Information     [employer] gives you secure access to your electronic health record. If you see a primary care provider, you can also send messages to your care team and make appointments. If you have questions, please call your primary care clinic.  If you do not have a primary care provider, please call [phone] and they will assist you.        Care EveryWhere ID     This is your Care EveryWhere ID. This could be used by other organizations to access your [place] medical records  [id number]        Your Vitals Were     Pulse Pulse Oximetry BMI (Body Mass Index)             90 98% 32.01 kg/m2          Blood Pressure from Last 3 Encounters:   04/20/17 131/81   02/28/17 121/73   04/07/16 132/80    Weight from Last 3 Encounters:   04/26/17 107 kg (236 lb)   04/20/17 107 kg (236 lb)   02/28/17 105.7 kg (233 lb)               Primary Care Provider Office Phone # Fax #    Humbetro Humphrey [phone]       [facility] [address]  [facility] [address]        Thank  you!     Thank you for choosing [facility]  for your care. Our goal is always to provide you with excellent care. Hearing back from our patients is one way we can continue to improve our services. Please take a few minutes to complete the written survey that you may receive in the mail after your visit with us. Thank you!             Your Updated Medication List - Protect others around you: Learn how to safely use, store and throw away your medicines at www.disposemymeds.org.          This list is accurate as of: 4/26/17 12:54 PM.  Always use your most recent med list.                   Brand Name Dispense Instructions for use    amLODIPine-benazepril 5-20 MG per capsule    LOTREL    90 capsule    Take 1 capsule by mouth daily for blood pressure.       aspirin 81 MG tablet      1 TABLET DAILY*       diclofenac 50 MG EC tablet    VOLTAREN    60 tablet    Take 1 tablet (50 mg) by mouth 3 times daily as needed for moderate pain (toe) . Take with food.       latanoprost 0.005 % ophthalmic solution    XALATAN    1 Bottle    Place 1 drop into both eyes At Bedtime       metFORMIN 500 MG 24 hr tablet    GLUCOPHAGE-XR    360 tablet    Take 2 tablets (1,000 mg) by mouth 2 times daily (with meals) for diabetes.       niacin 500 MG CR tablet    NIASPAN    360 tablet    Take 4 tablets (2,000 mg) by mouth At Bedtime for cholesterol. Take with low-fat snack.       STATIN NOT PRESCRIBED (INTENTIONAL)      Causes muscle aches

## 2017-04-26 NOTE — PATIENT INSTRUCTIONS
We wish you continued good healing. If you have any questions or concerns, please do not hesitate to contact us at [phone].      Please remember to call and schedule a follow up appointment if one was recommended at your earliest convenience.   PODIATRY CLINIC HOURS  TELEPHONE NUMBER    Dr. Jordan Taylor D.P.M [facility]    Clinics:  [facility]        Venus Vela MA  Medical Assistant  Tuesday 1PM-6PM  [facility]  Wednesday 7AM-2PM  [place]  Thursday 10AM-6PM  [place] Friday 7AM-345PM  [place]  Specialty schedulers:   [phone] to make an appointment with any Specialty Provider.        Urgent Care locations:    [facility] Monday-Friday 5 pm - 9 pm. Saturday-Sunday 9 am -5pm    Monday-Friday 11 am - 9 pm Saturday 9 am - 5 pm     Monday-Sunday 12 noon-8PM [phone]     If you need a medication refill, please contact us you may need lab work and/or a follow up visit prior to your refill (i.e. Antifungal medications).    [employer] (secure e-mail communication and access to your chart) to send a message or to make an appointment.    If MRI needed please call [facility] at [phone]        Weight management plan: Patient was referred to their PCP to discuss a diet and exercise plan.

## 2017-05-04 ENCOUNTER — TELEPHONE (OUTPATIENT)
Dept: FAMILY MEDICINE | Facility: CLINIC | Age: 57
End: 2017-05-04

## 2017-05-04 DIAGNOSIS — E11.9 TYPE 2 DIABETES MELLITUS WITHOUT COMPLICATION, WITHOUT LONG-TERM CURRENT USE OF INSULIN (H): ICD-10-CM

## 2017-05-04 DIAGNOSIS — I10 ESSENTIAL HYPERTENSION WITH GOAL BLOOD PRESSURE LESS THAN 140/90: ICD-10-CM

## 2017-05-04 NOTE — TELEPHONE ENCOUNTER
Per review of chart, refills of both Amlodipine and Metformin were sent to [facility] on 02/28/2017 for #90 day supply with 1 additional refill.  Below message indicated patient wants prescriptions to go to [facility].    Team - please advise patient to contact [facility] to initiate transfer of Amlodipine and Metformin prescriptions as patient should have refills remaining.  Mickie Garrison RN

## 2017-05-04 NOTE — TELEPHONE ENCOUNTER
Reason for Call:  Medication or medication refill:    Do you use a [facility]?  Name of the pharmacy and phone number for the current request:  [id number] - [place], [place] - [address]    Name of the medication requested: Amlodipine and Metformin    Other request: Please call and inform patient when ready. Thanks.    Can we leave a detailed message on this number? YES    Phone number patient can be reached at: Cell number on file:    Telephone Information:   Mobile [phone]       Best Time: Anytime     Call taken on 5/4/2017 at 4:25 PM by Laura aH

## 2017-05-05 RX ORDER — METFORMIN HCL 500 MG
1000 TABLET, EXTENDED RELEASE 24 HR ORAL 2 TIMES DAILY WITH MEALS
Qty: 360 TABLET | Refills: 0 | Status: SHIPPED | OUTPATIENT
Start: 2017-05-05 | End: 2017-08-02

## 2017-05-05 RX ORDER — AMLODIPINE AND BENAZEPRIL HYDROCHLORIDE 5; 20 MG/1; MG/1
1 CAPSULE ORAL DAILY
Qty: 90 CAPSULE | Refills: 0 | Status: SHIPPED | OUTPATIENT
Start: 2017-05-05 | End: 2017-08-02

## 2017-05-05 NOTE — TELEPHONE ENCOUNTER
RN called [facility] and was unable to speak to a person directly.  RN huddled with provider and received permission to send the remaining refill to [facility].  Prescription sent.  Patient notified.    Ramonita Ness RN

## 2017-05-05 NOTE — TELEPHONE ENCOUNTER
Called and spoke with patient, informed him of nurse's message. Patient states that he had been calling [facility] for days and no one is answering his calls. He did go to [facility] to have them transfer his medications from [facility], but [facility] is also having a hard time getting a hold of [facility]. Patient asking if provider can just send prescription to [facility].    Trish Liz MA

## 2017-08-02 DIAGNOSIS — E11.9 TYPE 2 DIABETES MELLITUS WITHOUT COMPLICATION, WITHOUT LONG-TERM CURRENT USE OF INSULIN (H): ICD-10-CM

## 2017-08-02 DIAGNOSIS — I10 ESSENTIAL HYPERTENSION WITH GOAL BLOOD PRESSURE LESS THAN 140/90: ICD-10-CM

## 2017-08-02 NOTE — TELEPHONE ENCOUNTER
amLODIPine-benazepril (LOTREL) 5-20 MG per capsule      Last Written Prescription Date: 5/5/17  Last Fill Quantity: 90, # refills: 0    Last Office Visit with [facility], [facility] or [facility] prescribing provider:  4/20/17   Future Office Visit:        BP Readings from Last 3 Encounters:   04/20/17 131/81   02/28/17 121/73   04/07/16 132/80           Caio Faarax  [facility]

## 2017-08-03 NOTE — TELEPHONE ENCOUNTER
metFORMIN (GLUCOPHAGE-XR) 500 MG 24 hr tablet         Last Written Prescription Date: 5/5/17  Last Fill Quantity: 360, # refills: 0  Last Office Visit with G, [facility] or [facility] prescribing provider:  4/20/17        BP Readings from Last 3 Encounters:   04/20/17 131/81   02/28/17 121/73   04/07/16 132/80     Lab Results   Component Value Date    MICROL 9 02/28/2017     Lab Results   Component Value Date    UMALCR 4.64 02/28/2017     Creatinine   Date Value Ref Range Status   02/28/2017 0.94 0.66 - 1.25 mg/dL Final   ]  GFR Estimate   Date Value Ref Range Status   02/28/2017 82 >60 mL/min/1.7m2 Final     Comment:     Non  GFR Calc   04/07/2016 >90  Non  GFR Calc   >60 mL/min/1.7m2 Final   11/10/2015 89 >60 mL/min/1.7m2 Final     Comment:     Non  GFR Calc     GFR Estimate If Black   Date Value Ref Range Status   02/28/2017 >90   GFR Calc   >60 mL/min/1.7m2 Final   04/07/2016 >90   GFR Calc   >60 mL/min/1.7m2 Final   11/10/2015 >90   GFR Calc   >60 mL/min/1.7m2 Final     Lab Results   Component Value Date    CHOL 161 02/28/2017     Lab Results   Component Value Date    HDL 72 02/28/2017     Lab Results   Component Value Date    LDL 78 02/28/2017     Lab Results   Component Value Date    TRIG 54 02/28/2017     Lab Results   Component Value Date    CHOLHDLRATIO 2.1 11/10/2015     No results found for: AST  Lab Results   Component Value Date    ALT 21 02/28/2017     Lab Results   Component Value Date    A1C 7.8 02/28/2017    A1C 7.5 04/07/2016    A1C 6.9 11/10/2015    A1C 6.9 12/16/2014    A1C 7.0 05/20/2014     Potassium   Date Value Ref Range Status   02/28/2017 3.9 3.4 - 5.3 mmol/L Final           Caio Faarax  [facility]

## 2017-08-06 RX ORDER — METFORMIN HCL 500 MG
TABLET, EXTENDED RELEASE 24 HR ORAL
Qty: 120 TABLET | Refills: 0 | Status: SHIPPED | OUTPATIENT
Start: 2017-08-06 | End: 2017-09-11

## 2017-08-06 RX ORDER — AMLODIPINE AND BENAZEPRIL HYDROCHLORIDE 5; 20 MG/1; MG/1
CAPSULE ORAL
Qty: 90 CAPSULE | Refills: 0 | Status: SHIPPED | OUTPATIENT
Start: 2017-08-06 | End: 2017-10-12

## 2017-08-06 NOTE — TELEPHONE ENCOUNTER
Medication is being filled for 1 time refill only. Patient needs to be seen for office visit around 8/28/17.    Esau Kern RN

## 2017-09-11 DIAGNOSIS — E11.9 TYPE 2 DIABETES MELLITUS WITHOUT COMPLICATION, WITHOUT LONG-TERM CURRENT USE OF INSULIN (H): ICD-10-CM

## 2017-09-11 NOTE — TELEPHONE ENCOUNTER
metFORMIN (GLUCOPHAGE-XR) 500 MG 24 hr tablet         Last Written Prescription Date: 8/6/17  Last Fill Quantity: 120, # refills: 0  Last Office Visit with G, [facility] or [facility] prescribing provider:  4/20/17        BP Readings from Last 3 Encounters:   04/20/17 131/81   02/28/17 121/73   04/07/16 132/80     Lab Results   Component Value Date    MICROL 9 02/28/2017     Lab Results   Component Value Date    UMALCR 4.64 02/28/2017     Creatinine   Date Value Ref Range Status   02/28/2017 0.94 0.66 - 1.25 mg/dL Final   ]  GFR Estimate   Date Value Ref Range Status   02/28/2017 82 >60 mL/min/1.7m2 Final     Comment:     Non  GFR Calc   04/07/2016 >90  Non  GFR Calc   >60 mL/min/1.7m2 Final   11/10/2015 89 >60 mL/min/1.7m2 Final     Comment:     Non  GFR Calc     GFR Estimate If Black   Date Value Ref Range Status   02/28/2017 >90   GFR Calc   >60 mL/min/1.7m2 Final   04/07/2016 >90   GFR Calc   >60 mL/min/1.7m2 Final   11/10/2015 >90   GFR Calc   >60 mL/min/1.7m2 Final     Lab Results   Component Value Date    CHOL 161 02/28/2017     Lab Results   Component Value Date    HDL 72 02/28/2017     Lab Results   Component Value Date    LDL 78 02/28/2017     Lab Results   Component Value Date    TRIG 54 02/28/2017     Lab Results   Component Value Date    CHOLHDLRATIO 2.1 11/10/2015     No results found for: AST  Lab Results   Component Value Date    ALT 21 02/28/2017     Lab Results   Component Value Date    A1C 7.8 02/28/2017    A1C 7.5 04/07/2016    A1C 6.9 11/10/2015    A1C 6.9 12/16/2014    A1C 7.0 05/20/2014     Potassium   Date Value Ref Range Status   02/28/2017 3.9 3.4 - 5.3 mmol/L Final         [facility]

## 2017-09-13 RX ORDER — METFORMIN HCL 500 MG
TABLET, EXTENDED RELEASE 24 HR ORAL
Qty: 120 TABLET | Refills: 0 | Status: SHIPPED | OUTPATIENT
Start: 2017-09-13 | End: 2017-10-12

## 2017-09-14 DIAGNOSIS — H40.10X0 OPEN-ANGLE GLAUCOMA OF BOTH EYES, UNSPECIFIED GLAUCOMA STAGE, UNSPECIFIED OPEN-ANGLE GLAUCOMA TYPE: ICD-10-CM

## 2017-09-14 RX ORDER — LATANOPROST 50 UG/ML
1 SOLUTION/ DROPS OPHTHALMIC AT BEDTIME
Qty: 1 BOTTLE | Refills: 12 | Status: SHIPPED | OUTPATIENT
Start: 2017-09-14 | End: 2018-02-15

## 2017-10-11 DIAGNOSIS — E11.9 TYPE 2 DIABETES MELLITUS WITHOUT COMPLICATION, WITHOUT LONG-TERM CURRENT USE OF INSULIN (H): ICD-10-CM

## 2017-10-11 RX ORDER — METFORMIN HCL 500 MG
TABLET, EXTENDED RELEASE 24 HR ORAL
Qty: 120 TABLET | Refills: 0 | Status: CANCELLED | OUTPATIENT
Start: 2017-10-11

## 2017-10-11 NOTE — TELEPHONE ENCOUNTER
metFORMIN (GLUCOPHAGE-XR) 500 MG 24 hr tablet         Last Written Prescription Date: 09/13/17  Last Fill Quantity: 120, # refills: 0  Last Office Visit with [facility], [facility] or [facility] prescribing provider:  04/20/17     Next 5 appointments (look out 90 days)     Oct 12, 2017 12:00 PM CDT   PHYSICAL with Humberto Humphrey MD   [facility]    [address] [place] [address] [phone]                   BP Readings from Last 3 Encounters:   04/20/17 131/81   02/28/17 121/73   04/07/16 132/80     Lab Results   Component Value Date    MICROL 9 02/28/2017     Lab Results   Component Value Date    UMALCR 4.64 02/28/2017     Creatinine   Date Value Ref Range Status   02/28/2017 0.94 0.66 - 1.25 mg/dL Final   ]  GFR Estimate   Date Value Ref Range Status   02/28/2017 82 >60 mL/min/1.7m2 Final     Comment:     Non  GFR Calc   04/07/2016 >90  Non  GFR Calc   >60 mL/min/1.7m2 Final   11/10/2015 89 >60 mL/min/1.7m2 Final     Comment:     Non  GFR Calc     GFR Estimate If Black   Date Value Ref Range Status   02/28/2017 >90   GFR Calc   >60 mL/min/1.7m2 Final   04/07/2016 >90   GFR Calc   >60 mL/min/1.7m2 Final   11/10/2015 >90   GFR Calc   >60 mL/min/1.7m2 Final     Lab Results   Component Value Date    CHOL 161 02/28/2017     Lab Results   Component Value Date    HDL 72 02/28/2017     Lab Results   Component Value Date    LDL 78 02/28/2017     Lab Results   Component Value Date    TRIG 54 02/28/2017     Lab Results   Component Value Date    CHOLHDLRATIO 2.1 11/10/2015     No results found for: AST  Lab Results   Component Value Date    ALT 21 02/28/2017     Lab Results   Component Value Date    A1C 7.8 02/28/2017    A1C 7.5 04/07/2016    A1C 6.9 11/10/2015    A1C 6.9 12/16/2014    A1C 7.0 05/20/2014     Potassium   Date Value Ref Range Status   02/28/2017 3.9 3.4  - 5.3 mmol/L Final         Michelle Colon  [facility]

## 2017-10-12 ENCOUNTER — OFFICE VISIT (OUTPATIENT)
Dept: FAMILY MEDICINE | Facility: CLINIC | Age: 57
End: 2017-10-12
Payer: COMMERCIAL

## 2017-10-12 VITALS
WEIGHT: 235 LBS | HEART RATE: 85 BPM | OXYGEN SATURATION: 97 % | BODY MASS INDEX: 31.83 KG/M2 | DIASTOLIC BLOOD PRESSURE: 75 MMHG | SYSTOLIC BLOOD PRESSURE: 126 MMHG | TEMPERATURE: 99.2 F | HEIGHT: 72 IN

## 2017-10-12 DIAGNOSIS — M19.079 ARTHRITIS OF FIRST MTP JOINT: ICD-10-CM

## 2017-10-12 DIAGNOSIS — Z23 NEED FOR PROPHYLACTIC VACCINATION AND INOCULATION AGAINST INFLUENZA: ICD-10-CM

## 2017-10-12 DIAGNOSIS — E78.5 HYPERLIPIDEMIA LDL GOAL <100: ICD-10-CM

## 2017-10-12 DIAGNOSIS — Z00.00 ENCOUNTER FOR ROUTINE ADULT HEALTH EXAMINATION WITHOUT ABNORMAL FINDINGS: Primary | ICD-10-CM

## 2017-10-12 DIAGNOSIS — E11.9 TYPE 2 DIABETES MELLITUS WITHOUT COMPLICATION, WITHOUT LONG-TERM CURRENT USE OF INSULIN (H): ICD-10-CM

## 2017-10-12 DIAGNOSIS — I10 ESSENTIAL HYPERTENSION WITH GOAL BLOOD PRESSURE LESS THAN 140/90: ICD-10-CM

## 2017-10-12 LAB
ALT SERPL W P-5'-P-CCNC: 24 U/L (ref 0–70)
CHOLEST SERPL-MCNC: 159 MG/DL
CREAT SERPL-MCNC: 0.95 MG/DL (ref 0.66–1.25)
GFR SERPL CREATININE-BSD FRML MDRD: 81 ML/MIN/1.7M2
HBA1C MFR BLD: 7.1 % (ref 4.3–6)
HDLC SERPL-MCNC: 67 MG/DL
LDLC SERPL CALC-MCNC: 83 MG/DL
NONHDLC SERPL-MCNC: 92 MG/DL
POTASSIUM SERPL-SCNC: 4 MMOL/L (ref 3.4–5.3)
T4 FREE SERPL-MCNC: 1.18 NG/DL (ref 0.76–1.46)
TRIGL SERPL-MCNC: 46 MG/DL
TSH SERPL DL<=0.005 MIU/L-ACNC: 0.27 MU/L (ref 0.4–4)

## 2017-10-12 PROCEDURE — 90686 IIV4 VACC NO PRSV 0.5 ML IM: CPT | Performed by: FAMILY MEDICINE

## 2017-10-12 PROCEDURE — 84439 ASSAY OF FREE THYROXINE: CPT | Performed by: FAMILY MEDICINE

## 2017-10-12 PROCEDURE — 84460 ALANINE AMINO (ALT) (SGPT): CPT | Performed by: FAMILY MEDICINE

## 2017-10-12 PROCEDURE — 99396 PREV VISIT EST AGE 40-64: CPT | Mod: 25 | Performed by: FAMILY MEDICINE

## 2017-10-12 PROCEDURE — 82565 ASSAY OF CREATININE: CPT | Performed by: FAMILY MEDICINE

## 2017-10-12 PROCEDURE — 84443 ASSAY THYROID STIM HORMONE: CPT | Performed by: FAMILY MEDICINE

## 2017-10-12 PROCEDURE — 84132 ASSAY OF SERUM POTASSIUM: CPT | Performed by: FAMILY MEDICINE

## 2017-10-12 PROCEDURE — 90471 IMMUNIZATION ADMIN: CPT | Performed by: FAMILY MEDICINE

## 2017-10-12 PROCEDURE — 80061 LIPID PANEL: CPT | Performed by: FAMILY MEDICINE

## 2017-10-12 PROCEDURE — 36415 COLL VENOUS BLD VENIPUNCTURE: CPT | Performed by: FAMILY MEDICINE

## 2017-10-12 PROCEDURE — 83036 HEMOGLOBIN GLYCOSYLATED A1C: CPT | Performed by: FAMILY MEDICINE

## 2017-10-12 RX ORDER — NIACIN 500 MG/1
2000 TABLET, EXTENDED RELEASE ORAL AT BEDTIME
Qty: 360 TABLET | Refills: 1 | Status: SHIPPED | OUTPATIENT
Start: 2017-10-12 | End: 2018-03-29

## 2017-10-12 RX ORDER — METFORMIN HCL 500 MG
TABLET, EXTENDED RELEASE 24 HR ORAL
Qty: 120 TABLET | Refills: 0 | Status: SHIPPED | OUTPATIENT
Start: 2017-10-12 | End: 2017-11-09

## 2017-10-12 RX ORDER — AMLODIPINE AND BENAZEPRIL HYDROCHLORIDE 5; 20 MG/1; MG/1
CAPSULE ORAL
Qty: 90 CAPSULE | Refills: 0 | Status: SHIPPED | OUTPATIENT
Start: 2017-10-12 | End: 2018-02-17

## 2017-10-12 ASSESSMENT — PAIN SCALES - GENERAL: PAINLEVEL: NO PAIN (0)

## 2017-10-12 NOTE — MR AVS SNAPSHOT
After Visit Summary   10/12/2017    Ren Farrar    MRN: [id number]           Patient Information     Date Of Birth          1960        Visit Information        Provider Department      10/12/2017 12:00 PM Humberto Humphrey MD [facility]        Today's Diagnoses     Encounter for routine adult health examination without abnormal findings    -  1    Type 2 diabetes mellitus without complication, without long-term current use of insulin (H)        Hyperlipidemia LDL goal <100        Essential hypertension with goal blood pressure less than 140/90        Arthritis of first MTP joint        Need for prophylactic vaccination and inoculation against influenza          Care Instructions        ================================================================================  Normal Values   Blood pressure  <140/90 for most adults    <130/80 for some chronic diseases (ask your care team about yours)    BMI (body mass index)  18.5-25 kg/m2 (based on height and weight)     Thank you for visiting [facility]    Normal or non-critical lab and imaging results will be communicated to you by MyChart, letter or phone within 7 days.  If you do not hear from us within 10 days, please call the clinic. If you have a critical or abnormal lab result, we will notify you by phone as soon as possible.     If you have any questions regarding your visit please contact:     Team Comfort:   Clinic Hours Telephone Number   Dr. Humberto Cherry 7am-5pm  Monday - Friday [phone]  Yany RN  Sheyla RN  Ramonita RN   Pharmacy 8:00am-8pm Monday-Friday    9am-5pm Saturday-Sunday [phone]   Urgent Care 11am-9pm Monday-Friday        9am-5pm Saturday-Sunday [phone]     After hours, weekend or if you need to make an appointment with your primary provider please call [phone].   After Hours nurse advise: call [facility]:  [phone]    Medication Refills:  Call your pharmacy and they will forward the refill to us. Please allow 3 business days for your refills to be completed.          Preventive Health Recommendations  Male Ages 50 - 64    Yearly exam:             See your health care provider every year in order to  o   Review health changes.   o   Discuss preventive care.    o   Review your medicines if your doctor has prescribed any.     Have a cholesterol test every 5 years, or more frequently if you are at risk for high cholesterol/heart disease.     Have a diabetes test (fasting glucose) every three years. If you are at risk for diabetes, you should have this test more often.     Have a colonoscopy at age 50, or have a yearly FIT test (stool test). These exams will check for colon cancer.      Talk with your health care provider about whether or not a prostate cancer screening test (PSA) is right for you.    You should be tested each year for STDs (sexually transmitted diseases), if you re at risk.     Shots: Get a flu shot each year. Get a tetanus shot every 10 years.     Nutrition:    Eat at least 5 servings of fruits and vegetables daily.     Eat whole-grain bread, whole-wheat pasta and brown rice instead of white grains and rice.     Talk to your provider about Calcium and Vitamin D.     Lifestyle    Exercise for at least 150 minutes a week (30 minutes a day, 5 days a week). This will help you control your weight and prevent disease.     Limit alcohol to one drink per day.     No smoking.     Wear sunscreen to prevent skin cancer.     See your dentist every six months for an exam and cleaning.     See your eye doctor every 1 to 2 years.            Follow-ups after your visit        Follow-up notes from your care team     Return in about 5 months (around 3/23/2018) for diabetes.      Your next 10 appointments already scheduled     Nov 29, 2017  1:30 PM CST   New Visit with Katherine Danielson MD   [facility]  ([facility])    [address]  [place] [address] [phone]              Who to contact     If you have questions or need follow up information about today's clinic visit or your schedule please contact [facility] directly at [phone].  Normal or non-critical lab and imaging results will be communicated to you by MyChart, letter or phone within 4 business days after the clinic has received the results. If you do not hear from us within 7 days, please contact the clinic through MyChart or phone. If you have a critical or abnormal lab result, we will notify you by phone as soon as possible.  Submit refill requests through [employer] or call your pharmacy and they will forward the refill request to us. Please allow 3 business days for your refill to be completed.          Additional Information About Your Visit        MyChart Information     [employer] gives you secure access to your electronic health record. If you see a primary care provider, you can also send messages to your care team and make appointments. If you have questions, please call your primary care clinic.  If you do not have a primary care provider, please call [phone] and they will assist you.        Care EveryWhere ID     This is your Care EveryWhere ID. This could be used by other organizations to access your [facility] medical records  [id number]        Your Vitals Were     Pulse Temperature Height Pulse Oximetry BMI (Body Mass Index)       85 99.2  F (37.3  C) (Oral) 6' (1.829 m) 97% 31.87 kg/m2        Blood Pressure from Last 3 Encounters:   10/12/17 126/75   04/20/17 131/81   02/28/17 121/73    Weight from Last 3 Encounters:   10/12/17 235 lb (106.6 kg)   04/26/17 236 lb (107 kg)   04/20/17 236 lb (107 kg)              We Performed the Following     ALT     Creatinine     HC FLU VAC PRESRV FREE QUAD SPLIT VIR 3+YRS IM     Hemoglobin A1c     Lipid panel reflex to direct LDL     Potassium     TSH  with free T4 reflex          Today's Medication Changes          These changes are accurate as of: 10/12/17  1:12 PM.  If you have any questions, ask your nurse or doctor.               These medicines have changed or have updated prescriptions.        Dose/Directions    amLODIPine-benazepril 5-20 MG per capsule   Commonly known as:  LOTREL   This may have changed:  See the new instructions.   Used for:  Essential hypertension with goal blood pressure less than 140/90   Changed by:  Humberto Humphrey MD        TAKE 1 CAPSULE BY MOUTH DAILY FOR BLOOD PRESSURE.   Quantity:  90 capsule   Refills:  0       metFORMIN 500 MG 24 hr tablet   Commonly known as:  GLUCOPHAGE-XR   This may have changed:  See the new instructions.   Used for:  Type 2 diabetes mellitus without complication, without long-term current use of insulin (H)   Changed by:  Humberto Humphrey MD        TAKE 2 TABLETS (1,000 MG) BY MOUTH 2 TIMES DAILY (WITH MEALS) FOR DIABETES.   Quantity:  120 tablet   Refills:  0            Where to get your medicines      These medications were sent to [facility]/pharmacy #[id number] - [place], [place] - [address]  [address] [facility] [address]     Phone:  [phone]     amLODIPine-benazepril 5-20 MG per capsule    diclofenac 50 MG EC tablet    metFORMIN 500 MG 24 hr tablet    niacin 500 MG CR tablet                Primary Care Provider Office Phone # Fax #    Humberto Humphrey [phone]       [address]  Health [facility] [address]        Equal Access to Services     R[facility] Unit[facility] AH: Hadii anushka ku hadasho Soomaali, waaxda luqadaha, qaybta kaalmada adeegyada, waxay jayy berg . So [facility] [phone].    ATENCIÓN: Si habla español, tiene a ortega disposición servicios gratuitos de asistencia lingüística. Llame al [phone].    We comply with applicable federal civil rights laws and [place] laws. We do not discriminate on the basis of race, color, national origin,  age, disability, sex, sexual orientation, or gender identity.            Thank you!     Thank you for choosing [facility]  for your care. Our goal is always to provide you with excellent care. Hearing back from our patients is one way we can continue to improve our services. Please take a few minutes to complete the written survey that you may receive in the mail after your visit with us. Thank you!             Your Updated Medication List - Protect others around you: Learn how to safely use, store and throw away your medicines at www.disposemymeds.org.          This list is accurate as of: 10/12/17  1:12 PM.  Always use your most recent med list.                   Brand Name Dispense Instructions for use Diagnosis    amLODIPine-benazepril 5-20 MG per capsule    LOTREL    90 capsule    TAKE 1 CAPSULE BY MOUTH DAILY FOR BLOOD PRESSURE.    Essential hypertension with goal blood pressure less than 140/90       aspirin 81 MG tablet      1 TABLET DAILY*        diclofenac 50 MG EC tablet    VOLTAREN    60 tablet    Take 1 tablet (50 mg) by mouth 3 times daily as needed for moderate pain (toe) . Take with food.    Arthritis of first MTP joint       latanoprost 0.005 % ophthalmic solution    XALATAN    1 Bottle    Place 1 drop into both eyes At Bedtime    Open-angle glaucoma of both eyes, unspecified glaucoma stage, unspecified open-angle glaucoma type       metFORMIN 500 MG 24 hr tablet    GLUCOPHAGE-XR    120 tablet    TAKE 2 TABLETS (1,000 MG) BY MOUTH 2 TIMES DAILY (WITH MEALS) FOR DIABETES.    Type 2 diabetes mellitus without complication, without long-term current use of insulin (H)       niacin 500 MG CR tablet    NIASPAN    360 tablet    Take 4 tablets (2,000 mg) by mouth At Bedtime for cholesterol. Take with low-fat snack.    Hyperlipidemia LDL goal <100       STATIN NOT PRESCRIBED (INTENTIONAL)      Causes muscle aches

## 2017-10-12 NOTE — PROGRESS NOTES
Injectable Influenza Immunization Documentation    1.  Is the person to be vaccinated sick today?   No    2. Does the person to be vaccinated have an allergy to a component   of the vaccine?   No    3. Has the person to be vaccinated ever had a serious reaction   to influenza vaccine in the past?   No    4. Has the person to be vaccinated ever had Guillain-Barré syndrome?   No    Form completed by GENOVEVA Grijalva MA

## 2017-10-12 NOTE — NURSING NOTE
Chief Complaint   Patient presents with     Hypertension     Diabetes     Lipids       Initial /75 (BP Location: Left arm, Patient Position: Chair, Cuff Size: Adult Large)  Pulse 85  Temp 99.2  F (37.3  C) (Oral)  Ht 6' (1.829 m)  Wt 235 lb (106.6 kg)  SpO2 97%  BMI 31.87 kg/m2 Estimated body mass index is 31.87 kg/(m^2) as calculated from the following:    Height as of this encounter: 6' (1.829 m).    Weight as of this encounter: 235 lb (106.6 kg).  Medication Reconciliation: satish Grijalva MA

## 2017-10-12 NOTE — PROGRESS NOTES
SUBJECTIVE:   CC: Ren Farrar is an 57 year old male who presents for preventative health visit and a follow up on his diabetes.     Healthy Habits:    Do you get at least three servings of calcium containing foods daily (dairy, green leafy vegetables, etc.)? no    Amount of exercise or daily activities, outside of work: He used to go to [employer], but since having foot pain over the past 3 months, he has not gone.    Problems taking medications regularly No    Medication side effects: No    Have you had an eye exam in the past two years? yes    Do you see a dentist twice per year? yes    Do you have sleep apnea, excessive snoring or daytime drowsiness?yes - sleep apnea    Diabetes Follow-up      Patient is checking blood sugars: rarely.      Diabetic concerns: None     Symptoms of hypoglycemia (low blood sugar): none     Paresthesias (numbness or burning in feet) or sores: No     Date of last diabetic eye exam: 11/21/16    Hyperlipidemia Follow-Up      Rate your low fat/cholesterol diet?: fair    Taking statin?  No    Other lipid medications/supplements?:  Niacin, without side effects    Hypertension Follow-up      Outpatient blood pressures are being checked at home.  Results are good.    Low Salt Diet: not monitoring salt        Amount of exercise or physical activity: None    Problems taking medications regularly: No    Medication side effects: none  Diet: low fat/cholesterol    Today's PHQ-2 Score:   PHQ-2 ( 1999 Pfizer) 4/20/2017 4/7/2016   Q1: Little interest or pleasure in doing things 0 0   Q2: Feeling down, depressed or hopeless 0 0   PHQ-2 Score 0 0         Abuse: Current or Past(Physical, Sexual or Emotional)- No  Do you feel safe in your environment - Yes  Social History   Substance Use Topics     Smoking status: Never Smoker     Smokeless tobacco: Never Used     Alcohol use Yes      Comment: occassioanally     The patient does not drink >3 drinks per day nor >7 drinks per week.    Last PSA:   PSA    Date Value Ref Range Status   11/01/2011 0.63 0 - 4 ug/L Final       Past medical, family, and social histories, medications, and allergies are reviewed and updated in Epic.     ROS:  C: NEGATIVE for fever, chills, change in weight  I: NEGATIVE for worrisome rashes, moles or lesions  E: NEGATIVE for vision changes or irritation  ENT: NEGATIVE for ear, mouth and throat problems  R: NEGATIVE for significant cough or SOB  CV: NEGATIVE for chest pain, palpitations or peripheral edema  GI: NEGATIVE for nausea, abdominal pain, heartburn, or change in bowel habits   male: negative for dysuria, hematuria, decreased urinary stream, erectile dysfunction, urethral discharge  M: Patient notes foot pain over the past 3 months. He has seen a podiatrist for this issue, who recommended specific shoes to help his foot heal.   N: NEGATIVE for weakness, dizziness or paresthesias  P: NEGATIVE for changes in mood or affect    Any history above obtained by the Medical Assistant was reviewed by Dr. Humberto Humphrey MD, and edited when necessary.    This document serves as a record of the services and decisions personally performed and made by Dr. Humphrey. It was created on his behalf by Shagufta Gonzalez, a trained medical scribe. The creation of this document is based the provider's statements to the medical scribe.  Shagufta Gonzalez October 12, 2017 1:04 PM      OBJECTIVE:   /75 (BP Location: Left arm, Patient Position: Chair, Cuff Size: Adult Large)  Pulse 85  Temp 99.2  F (37.3  C) (Oral)  Ht 1.829 m (6')  Wt 106.6 kg (235 lb)  SpO2 97%  BMI 31.87 kg/m2  EXAM:  GENERAL: healthy, alert and no distress  EYES: Eyes grossly normal to inspection, PERRL and conjunctivae and sclerae normal  HENT: ear canals and TM's normal, nose and mouth without ulcers or lesions  NECK: no adenopathy, no asymmetry, masses, or scars and thyroid normal to palpation  RESP: lungs clear to auscultation, no crackles or wheezes, no areas of dullness,  no tachypnea   CV: regular rate and rhythm, normal S1 S2, no S3 or S4, no murmur, click or rub, no peripheral edema and peripheral pulses strong  ABDOMEN: soft, nontender, no hepatosplenomegaly, no masses and bowel sounds normal   (male): normal male genitalia without lesions or urethral discharge, no hernia  MS: no gross musculoskeletal defects noted, no edema  SKIN: no suspicious lesions or rashes  NEURO: Normal strength and tone, mentation intact and speech normal, DTRs symmetrical, cranial nerves 2-12 intact   PSYCH: mentation appears normal, affect normal/bright     Diagnostic Test Results:  Results for orders placed or performed in visit on 10/12/17 (from the past 24 hour(s))   Hemoglobin A1c   Result Value Ref Range    Hemoglobin A1C 7.1 (H) 4.3 - 6.0 %      Lab Results   Component Value Date    A1C 7.1 10/12/2017    A1C 7.8 02/28/2017    A1C 7.5 04/07/2016    A1C 6.9 11/10/2015    A1C 6.9 12/16/2014     ASSESSMENT/PLAN:   (Z00.00) Encounter for routine adult health examination without abnormal findings  (primary encounter diagnosis)  Comment: Negative screening exam; up-to-date on preventive services.   Plan: Follow up in 1 year.    (E11.9) Type 2 diabetes mellitus without complication, without long-term current use of insulin (H)  Comment: Well controlled with an improved HgbA1c   Plan: Hemoglobin A1c, TSH with free T4 reflex,         metFORMIN (GLUCOPHAGE-XR) 500 MG 24 hr tablet        HgbA1c and weight graph provided. Return in about 5 months (around 3/23/2018) for diabetes.     (E78.5) Hyperlipidemia LDL goal <100  Comment: historically at goal.  Plan: Lipid panel reflex to direct LDL, ALT, niacin         (NIASPAN) 500 MG CR tablet        Follow up in March.    (I10) Essential hypertension with goal blood pressure less than 140/90  Comment: Well controlled.  Plan: Potassium, Creatinine, amLODIPine-benazepril         (LOTREL) 5-20 MG per capsule        Follow up in March.    (M19.079) Arthritis of first  MTP joint  Comment: Refill request.   Plan: diclofenac (VOLTAREN) 50 MG EC tablet            (Z23) Need for prophylactic vaccination and inoculation against influenza  Comment: Influenza vaccine offered and accepted by patient. He has received it before without problems.   Plan: HC FLU VAC PRESRV FREE QUAD SPLIT VIR 3+YRS IM              COUNSELING:  Reviewed preventive health counseling, as reflected in patient instructions       reports that he has never smoked. He has never used smokeless tobacco.      Estimated body mass index is 31.87 kg/(m^2) as calculated from the following:    Height as of this encounter: 1.829 m (6').    Weight as of this encounter: 106.6 kg (235 lb).   Weight management plan: as outlined in the AVS. Exercise routine as stated above.    Counseling Resources:  ATP IV Guidelines  Pooled Cohorts Equation Calculator  FRAX Risk Assessment  ICSI Preventive Guidelines  Dietary Guidelines for Americans, 2010  USDA's MyPlate  ASA Prophylaxis  Lung CA Screening    The information in this document, created by the medical scribe for me, accurately reflects the services I personally performed and the decisions made by me. I have reviewed and approved this document for accuracy prior to leaving the patient care area. October 12, 2017 1:05 PM    Humberto Humphrey MD  [facility]

## 2017-10-12 NOTE — PATIENT INSTRUCTIONS
================================================================================  Normal Values   Blood pressure  <140/90 for most adults    <130/80 for some chronic diseases (ask your care team about yours)    BMI (body mass index)  18.5-25 kg/m2 (based on height and weight)     Thank you for visiting [facility]    Normal or non-critical lab and imaging results will be communicated to you by MyChart, letter or phone within 7 days.  If you do not hear from us within 10 days, please call the clinic. If you have a critical or abnormal lab result, we will notify you by phone as soon as possible.     If you have any questions regarding your visit please contact:     Team Comfort:   Clinic Hours Telephone Number   Dr. Humberto Brown [address] [place] 7am-5pm  Monday - Friday [phone]  Yany RN  Sheyla Shipman RN   Pharmacy 8:00am-8pm Monday-Friday    9am-5pm Saturday-Sunday [phone]   Urgent Care 11am-9pm Monday-Friday        9am-5pm Saturday-Sunday [phone]     After hours, weekend or if you need to make an appointment with your primary provider please call [phone].   After Hours nurse advise: call [place] [facility]: [phone]    Medication Refills:  Call your pharmacy and they will forward the refill to us. Please allow 3 business days for your refills to be completed.          Preventive Health Recommendations  Male Ages 50   64    Yearly exam:             See your health care provider every year in order to  o   Review health changes.   o   Discuss preventive care.    o   Review your medicines if your doctor has prescribed any.     Have a cholesterol test every 5 years, or more frequently if you are at risk for high cholesterol/heart disease.     Have a diabetes test (fasting glucose) every three years. If you are at risk for diabetes, you should have this test more often.     Have a colonoscopy at age 50, or have a yearly FIT test  (stool test). These exams will check for colon cancer.      Talk with your health care provider about whether or not a prostate cancer screening test (PSA) is right for you.    You should be tested each year for STDs (sexually transmitted diseases), if you re at risk.     Shots: Get a flu shot each year. Get a tetanus shot every 10 years.     Nutrition:    Eat at least 5 servings of fruits and vegetables daily.     Eat whole-grain bread, whole-wheat pasta and brown rice instead of white grains and rice.     Talk to your provider about Calcium and Vitamin D.     Lifestyle    Exercise for at least 150 minutes a week (30 minutes a day, 5 days a week). This will help you control your weight and prevent disease.     Limit alcohol to one drink per day.     No smoking.     Wear sunscreen to prevent skin cancer.     See your dentist every six months for an exam and cleaning.     See your eye doctor every 1 to 2 years.

## 2017-11-09 DIAGNOSIS — E11.9 TYPE 2 DIABETES MELLITUS WITHOUT COMPLICATION, WITHOUT LONG-TERM CURRENT USE OF INSULIN (H): ICD-10-CM

## 2017-11-09 RX ORDER — METFORMIN HCL 500 MG
TABLET, EXTENDED RELEASE 24 HR ORAL
Qty: 360 TABLET | Refills: 1 | Status: SHIPPED | OUTPATIENT
Start: 2017-11-09 | End: 2018-03-29

## 2018-02-13 ENCOUNTER — TELEPHONE (OUTPATIENT)
Dept: FAMILY MEDICINE | Facility: CLINIC | Age: 58
End: 2018-02-13

## 2018-02-13 NOTE — TELEPHONE ENCOUNTER
Routing refill request to provider for review/approval because:  Drug not active on patient's medication list.    Concepcion Odom RN, [facility]

## 2018-02-13 NOTE — TELEPHONE ENCOUNTER
Call patient and informed of Dr. Humphrey message below.  Patient stated that he saw Dr. Humphrey before for ED and don't know why provider won't prescribed cialis for him.  Patient stated he don't remember who gave him this medication and that it has been too long.  Inform patient that ED was discuss at visit on 11-10-15 and provider will like to see him before prescribing Cialis.  Patient is very upset and stated he'll find another provider.    DARA Ashley MA

## 2018-02-13 NOTE — TELEPHONE ENCOUNTER
Pharmacy is requesting Rx for Cilais.    I don't see this in paitents chart, Viagra was discontinued.

## 2018-02-13 NOTE — TELEPHONE ENCOUNTER
Routing refill request to provider for review/approval because:  Drug not active on patient's medication list- Cialis request  Sheyla Farah RN

## 2018-02-13 NOTE — TELEPHONE ENCOUNTER
Cialis has never been prescribed by this office, therefore it is not a refill request. If a previous provider has prescribed it, the patient should request it from them. Otherwise, the patient needs to be seen.

## 2018-02-15 ENCOUNTER — OFFICE VISIT (OUTPATIENT)
Dept: OPHTHALMOLOGY | Facility: CLINIC | Age: 58
End: 2018-02-15
Payer: COMMERCIAL

## 2018-02-15 DIAGNOSIS — H25.13 NUCLEAR SCLEROTIC CATARACT OF BOTH EYES: ICD-10-CM

## 2018-02-15 DIAGNOSIS — H40.10X0 OPEN-ANGLE GLAUCOMA OF BOTH EYES, UNSPECIFIED GLAUCOMA STAGE, UNSPECIFIED OPEN-ANGLE GLAUCOMA TYPE: ICD-10-CM

## 2018-02-15 DIAGNOSIS — H52.4 PRESBYOPIA: ICD-10-CM

## 2018-02-15 DIAGNOSIS — E11.9 TYPE 2 DIABETES MELLITUS WITHOUT COMPLICATION, WITHOUT LONG-TERM CURRENT USE OF INSULIN (H): Primary | ICD-10-CM

## 2018-02-15 PROCEDURE — 92015 DETERMINE REFRACTIVE STATE: CPT | Performed by: STUDENT IN AN ORGANIZED HEALTH CARE EDUCATION/TRAINING PROGRAM

## 2018-02-15 PROCEDURE — 92014 COMPRE OPH EXAM EST PT 1/>: CPT | Performed by: STUDENT IN AN ORGANIZED HEALTH CARE EDUCATION/TRAINING PROGRAM

## 2018-02-15 RX ORDER — LATANOPROST 50 UG/ML
1 SOLUTION/ DROPS OPHTHALMIC AT BEDTIME
Qty: 1 BOTTLE | Refills: 12 | Status: SHIPPED | OUTPATIENT
Start: 2018-02-15 | End: 2019-02-18

## 2018-02-15 ASSESSMENT — CUP TO DISC RATIO
OS_RATIO: 0.7
OD_RATIO: 0.65

## 2018-02-15 ASSESSMENT — VISUAL ACUITY
OS_CC+: +1
METHOD: SNELLEN - LINEAR
OD_CC: 20/30
OD_CC+: -1
OS_CC: 20/50
CORRECTION_TYPE: GLASSES

## 2018-02-15 ASSESSMENT — REFRACTION_MANIFEST
OS_ADD: +2.50
OS_CYLINDER: +2.25
OD_AXIS: 010
OS_AXIS: 165
OD_SPHERE: +1.00
OD_CYLINDER: +2.50
OD_ADD: +2.50
OS_SPHERE: +1.25

## 2018-02-15 ASSESSMENT — REFRACTION_WEARINGRX
SPECS_TYPE: PAL
OS_CYLINDER: +0.75
OD_CYLINDER: +0.75
OD_SPHERE: +1.25
OS_ADD: +2.75
OD_ADD: +2.75
OS_SPHERE: +1.00
OS_AXIS: 166
OD_AXIS: 009

## 2018-02-15 ASSESSMENT — EXTERNAL EXAM - RIGHT EYE: OD_EXAM: NORMAL

## 2018-02-15 ASSESSMENT — CONF VISUAL FIELD
OS_NORMAL: 1
OD_NORMAL: 1

## 2018-02-15 ASSESSMENT — TONOMETRY
OS_IOP_MMHG: 25
IOP_METHOD: APPLANATION
OD_IOP_MMHG: 24

## 2018-02-15 ASSESSMENT — SLIT LAMP EXAM - LIDS
COMMENTS: NORMAL
COMMENTS: NORMAL

## 2018-02-15 ASSESSMENT — EXTERNAL EXAM - LEFT EYE: OS_EXAM: NORMAL

## 2018-02-15 NOTE — PATIENT INSTRUCTIONS
Continue Latanoprost: one drop both eyes at bedtime.  Glasses prescription given - optional    Katherine Danielson MD  [phone]    Diabetes weakens the blood vessels all over the body, including the eyes. Damage to the blood vessels in the eyes can cause swelling or bleeding into part of the eye (called the retina). This is called diabetic retinopathy (JÚNIOR-tin--puh-thee). If not treated, this disease can cause vision loss or blindness.   Symptoms may include blurred or distorted vision, but many people have no symptoms. It's important to see your eye doctor regularly to check for problems.   Early treatment and good control can help protect your vision. Here are the things you can do to help prevent vision loss:      1. Keep your blood sugar levels under tight control.      2. Bring high blood pressure under control.      3. No smoking.      4. Have yearly dilated eye exams.

## 2018-02-15 NOTE — LETTER
2/15/2018         RE: Ren Farrar  [address]  [place] [address]        Dear Colleague,    Thank you for referring your patient, Ren Farrar, to the [facility]. Please see a copy of my visit note below.     Current Eye Medications:  Latanoprost nightly both eyes.  Admits that he does not take his drops sometimes 2-3 times a week.     Subjective: Here for diabetic eye exam.  Wishes he could see better with both eyes. Getting harder for him to see in dim conditions.  Needs refills sent today     Objective:  See Ophthalmology Exam.      Assessment:  Ren Farrar is a 57 year old male who presents with:     Type 2 diabetes mellitus without complication, without long-term current use of insulin (H) Negative diabetic retinopathy       Open-angle glaucoma of both eyes, unspecified glaucoma stage, unspecified open-angle glaucoma type Discussed better compliance with drops - or consider SLT     Nuclear sclerotic cataract of both eyes        Plan:  Continue Latanoprost: one drop both eyes at bedtime.  Glasses prescription given - optional    Katherine Danielson MD  [phone]      Again, thank you for allowing me to participate in the care of your patient.        Sincerely,        Katherine Danielson MD

## 2018-02-15 NOTE — MR AVS SNAPSHOT
After Visit Summary   2/15/2018    Ren Farrar    MRN: [id number]           Patient Information     Date Of Birth          1960        Visit Information        Provider Department      2/15/2018 1:00 PM Katherine Danielson MD [facility]        Today's Diagnoses     Type 2 diabetes mellitus without complication, without long-term current use of insulin (H)    -  1    Open-angle glaucoma of both eyes, unspecified glaucoma stage, unspecified open-angle glaucoma type        Nuclear sclerotic cataract of both eyes        Presbyopia          Care Instructions    Continue Latanoprost: one drop both eyes at bedtime.  Glasses prescription given - optional    Katherine Danielson MD  [phone]    Diabetes weakens the blood vessels all over the body, including the eyes. Damage to the blood vessels in the eyes can cause swelling or bleeding into part of the eye (called the retina). This is called diabetic retinopathy (JÚNIOR-tin-AH-puh-thee). If not treated, this disease can cause vision loss or blindness.   Symptoms may include blurred or distorted vision, but many people have no symptoms. It's important to see your eye doctor regularly to check for problems.   Early treatment and good control can help protect your vision. Here are the things you can do to help prevent vision loss:      1. Keep your blood sugar levels under tight control.      2. Bring high blood pressure under control.      3. No smoking.      4. Have yearly dilated eye exams.            Follow-ups after your visit        Follow-up notes from your care team     Return in about 6 months (around 8/15/2018) for IOP check, HVF, OCT optic nerve.      Who to contact     If you have questions or need follow up information about today's clinic visit or your schedule please contact [facility] directly at [phone].  Normal or non-critical lab and imaging results will be communicated to you by MyChart, letter or phone  within 4 business days after the clinic has received the results. If you do not hear from us within 7 days, please contact the clinic through [employer] or phone. If you have a critical or abnormal lab result, we will notify you by phone as soon as possible.  Submit refill requests through [employer] or call your pharmacy and they will forward the refill request to us. Please allow 3 business days for your refill to be completed.          Additional Information About Your Visit        Grid [employer] Information     [employer] gives you secure access to your electronic health record. If you see a primary care provider, you can also send messages to your care team and make appointments. If you have questions, please call your primary care clinic.  If you do not have a primary care provider, please call [phone] and they will assist you.        Care EveryWhere ID     This is your Care EveryWhere ID. This could be used by other organizations to access your [place] medical records  [id number]         Blood Pressure from Last 3 Encounters:   10/12/17 126/75   04/20/17 131/81   02/28/17 121/73    Weight from Last 3 Encounters:   10/12/17 106.6 kg (235 lb)   04/26/17 107 kg (236 lb)   04/20/17 107 kg (236 lb)              We Performed the Following     EYE EXAM (SIMPLE-NONBILLABLE)     REFRACTIVE STATUS          Where to get your medicines      These medications were sent to [facility]/pharmacy #[id number] - [place], [place] - [address]  [address], [facility] [address]     Phone:  [phone]     latanoprost 0.005 % ophthalmic solution          Primary Care Provider Office Phone # Fax #    Humberto Humphrey [phone]       [address]  [facility] [address]        Equal Access to Services     [facility] MARJORIE : Flaquita Foley, roz caro, andreas gallegos, alessandra mukherjee. So [facility] [phone].    ATENCIÓN: Si habla español, tiene a ortega disposición  servicios gratuitos de asistencia lingüística. Buster hilario [phone].    We comply with applicable federal civil rights laws and [place] laws. We do not discriminate on the basis of race, color, national origin, age, disability, sex, sexual orientation, or gender identity.            Thank you!     Thank you for choosing [facility]  for your care. Our goal is always to provide you with excellent care. Hearing back from our patients is one way we can continue to improve our services. Please take a few minutes to complete the written survey that you may receive in the mail after your visit with us. Thank you!             Your Updated Medication List - Protect others around you: Learn how to safely use, store and throw away your medicines at www.disposemymeds.org.          This list is accurate as of 2/15/18  2:25 PM.  Always use your most recent med list.                   Brand Name Dispense Instructions for use Diagnosis    amLODIPine-benazepril 5-20 MG per capsule    LOTREL    90 capsule    TAKE 1 CAPSULE BY MOUTH DAILY FOR BLOOD PRESSURE.    Essential hypertension with goal blood pressure less than 140/90       aspirin 81 MG tablet      1 TABLET DAILY*        diclofenac 50 MG EC tablet    VOLTAREN    60 tablet    Take 1 tablet (50 mg) by mouth 3 times daily as needed for moderate pain (toe) . Take with food.    Arthritis of first MTP joint       latanoprost 0.005 % ophthalmic solution    XALATAN    1 Bottle    Place 1 drop into both eyes At Bedtime    Open-angle glaucoma of both eyes, unspecified glaucoma stage, unspecified open-angle glaucoma type       metFORMIN 500 MG 24 hr tablet    GLUCOPHAGE-XR    360 tablet    TAKE 2 TABLETS (1,000 MG) BY MOUTH 2 TIMES DAILY (WITH MEALS) FOR DIABETES.    Type 2 diabetes mellitus without complication, without long-term current use of insulin (H)       niacin 500 MG CR tablet    NIASPAN    360 tablet    Take 4 tablets (2,000 mg) by mouth At Bedtime for  cholesterol. Take with low-fat snack.    Hyperlipidemia LDL goal <100       STATIN NOT PRESCRIBED (INTENTIONAL)      Causes muscle aches

## 2018-02-15 NOTE — PROGRESS NOTES
Current Eye Medications:  Latanoprost nightly both eyes.  Admits that he does not take his drops sometimes 2-3 times a week.     Subjective: Here for diabetic eye exam.  Wishes he could see better with both eyes. Getting harder for him to see in dim conditions.  Needs refills sent today     Objective:  See Ophthalmology Exam.      Assessment:  eRn Farrar is a 57 year old male who presents with:     Type 2 diabetes mellitus without complication, without long-term current use of insulin (H) Negative diabetic retinopathy       Open-angle glaucoma of both eyes, unspecified glaucoma stage, unspecified open-angle glaucoma type Discussed better compliance with drops - or consider SLT     Nuclear sclerotic cataract of both eyes        Plan:  Continue Latanoprost: one drop both eyes at bedtime.  Glasses prescription given - optional    Katherine Danielson MD  [phone]

## 2018-02-17 DIAGNOSIS — I10 ESSENTIAL HYPERTENSION WITH GOAL BLOOD PRESSURE LESS THAN 140/90: ICD-10-CM

## 2018-02-17 NOTE — TELEPHONE ENCOUNTER
"Requested Prescriptions   Pending Prescriptions Disp Refills     amLODIPine-benazepril (LOTREL) 5-20 MG per capsule [Pharmacy Med Name: AMLODIPINE-BENAZEPRIL 5-20 MG]    Last Written Prescription Date:  10/12/17  Last Fill Quantity: 90,  # refills: 0   Last Office Visit with G, P or [facility] prescribing provider:  10/12/17   Future Office Visit:      90 capsule 0     Sig: TAKE 1 CAPSULE BY MOUTH DAILY FOR BLOOD PRESSURE.    Calcium Channel Blockers Protocol  Passed    2/17/2018 11:57 AM       Passed - Blood pressure under 140/90 in past 12 months    BP Readings from Last 3 Encounters:   10/12/17 126/75   04/20/17 131/81   02/28/17 121/73                Passed - Recent or future visit with authorizing provider    Patient had office visit in the last year or has a visit in the next 30 days with authorizing provider.  See \"Patient Info\" tab in inbasket, or \"Choose Columns\" in Meds & Orders section of the refill encounter.            Passed - Patient is age 18 or older       Passed - Normal serum creatinine on file in past 12 months    Recent Labs   Lab Test  10/12/17   1222   CR  0.95                   Caio Faarax  [facility]  "

## 2018-02-20 RX ORDER — AMLODIPINE AND BENAZEPRIL HYDROCHLORIDE 5; 20 MG/1; MG/1
CAPSULE ORAL
Qty: 30 CAPSULE | Refills: 0 | Status: SHIPPED | OUTPATIENT
Start: 2018-02-20 | End: 2018-03-29

## 2018-02-20 NOTE — TELEPHONE ENCOUNTER
Sent for 30 day as the patient is due to be seen in March. Please call and help patient to schedule.    Concepcion Odom RN, [facility]

## 2018-02-20 NOTE — TELEPHONE ENCOUNTER
Called, patient is notified of refill patient will call back to schedule an appointment before medication runs out.  Wm Schwab,  [facility]

## 2018-03-29 ENCOUNTER — OFFICE VISIT (OUTPATIENT)
Dept: FAMILY MEDICINE | Facility: CLINIC | Age: 58
End: 2018-03-29
Payer: COMMERCIAL

## 2018-03-29 VITALS
HEIGHT: 72 IN | SYSTOLIC BLOOD PRESSURE: 134 MMHG | TEMPERATURE: 98.8 F | WEIGHT: 242 LBS | HEART RATE: 84 BPM | OXYGEN SATURATION: 95 % | DIASTOLIC BLOOD PRESSURE: 82 MMHG | BODY MASS INDEX: 32.78 KG/M2

## 2018-03-29 DIAGNOSIS — N52.9 ERECTILE DYSFUNCTION, UNSPECIFIED ERECTILE DYSFUNCTION TYPE: ICD-10-CM

## 2018-03-29 DIAGNOSIS — E11.9 TYPE 2 DIABETES MELLITUS WITHOUT COMPLICATION, WITHOUT LONG-TERM CURRENT USE OF INSULIN (H): Primary | ICD-10-CM

## 2018-03-29 DIAGNOSIS — I10 ESSENTIAL HYPERTENSION WITH GOAL BLOOD PRESSURE LESS THAN 140/90: ICD-10-CM

## 2018-03-29 DIAGNOSIS — E78.5 HYPERLIPIDEMIA LDL GOAL <100: ICD-10-CM

## 2018-03-29 LAB
ALT SERPL W P-5'-P-CCNC: 25 U/L (ref 0–70)
ANION GAP SERPL CALCULATED.3IONS-SCNC: 6 MMOL/L (ref 3–14)
BUN SERPL-MCNC: 13 MG/DL (ref 7–30)
CALCIUM SERPL-MCNC: 9 MG/DL (ref 8.5–10.1)
CHLORIDE SERPL-SCNC: 106 MMOL/L (ref 94–109)
CHOLEST SERPL-MCNC: 166 MG/DL
CO2 SERPL-SCNC: 28 MMOL/L (ref 20–32)
CREAT SERPL-MCNC: 0.84 MG/DL (ref 0.66–1.25)
GFR SERPL CREATININE-BSD FRML MDRD: >90 ML/MIN/1.7M2
GLUCOSE SERPL-MCNC: 163 MG/DL (ref 70–99)
HBA1C MFR BLD: 8.3 % (ref 0–6.4)
HDLC SERPL-MCNC: 70 MG/DL
LDLC SERPL CALC-MCNC: 83 MG/DL
NONHDLC SERPL-MCNC: 96 MG/DL
POTASSIUM SERPL-SCNC: 3.9 MMOL/L (ref 3.4–5.3)
SODIUM SERPL-SCNC: 140 MMOL/L (ref 133–144)
TRIGL SERPL-MCNC: 63 MG/DL

## 2018-03-29 PROCEDURE — 84460 ALANINE AMINO (ALT) (SGPT): CPT | Performed by: FAMILY MEDICINE

## 2018-03-29 PROCEDURE — 99207 C FOOT EXAM  NO CHARGE: CPT | Performed by: FAMILY MEDICINE

## 2018-03-29 PROCEDURE — 83036 HEMOGLOBIN GLYCOSYLATED A1C: CPT | Performed by: FAMILY MEDICINE

## 2018-03-29 PROCEDURE — 36415 COLL VENOUS BLD VENIPUNCTURE: CPT | Performed by: FAMILY MEDICINE

## 2018-03-29 PROCEDURE — 99214 OFFICE O/P EST MOD 30 MIN: CPT | Performed by: FAMILY MEDICINE

## 2018-03-29 PROCEDURE — 80048 BASIC METABOLIC PNL TOTAL CA: CPT | Performed by: FAMILY MEDICINE

## 2018-03-29 PROCEDURE — 80061 LIPID PANEL: CPT | Performed by: FAMILY MEDICINE

## 2018-03-29 RX ORDER — METFORMIN HCL 500 MG
1000 TABLET, EXTENDED RELEASE 24 HR ORAL 2 TIMES DAILY WITH MEALS
Qty: 360 TABLET | Refills: 1 | Status: SHIPPED | OUTPATIENT
Start: 2018-03-29 | End: 2018-11-08

## 2018-03-29 RX ORDER — NIACIN 500 MG/1
2000 TABLET, EXTENDED RELEASE ORAL AT BEDTIME
Qty: 360 TABLET | Refills: 1 | Status: SHIPPED | OUTPATIENT
Start: 2018-03-29 | End: 2018-11-29

## 2018-03-29 RX ORDER — AMLODIPINE AND BENAZEPRIL HYDROCHLORIDE 5; 20 MG/1; MG/1
1 CAPSULE ORAL DAILY
Qty: 90 CAPSULE | Refills: 1 | Status: SHIPPED | OUTPATIENT
Start: 2018-03-29 | End: 2018-10-03

## 2018-03-29 ASSESSMENT — PAIN SCALES - GENERAL: PAINLEVEL: NO PAIN (0)

## 2018-03-29 NOTE — MR AVS SNAPSHOT
After Visit Summary   3/29/2018    Ren Farrar    MRN: [id number]           Patient Information     Date Of Birth          1960        Visit Information        Provider Department      3/29/2018 12:40 PM Humberto Humphrey MD [facility]        Today's Diagnoses     Type 2 diabetes mellitus without complication, without long-term current use of insulin (H)    -  1    Hyperlipidemia LDL goal <100        Essential hypertension with goal blood pressure less than 140/90        Erectile dysfunction, unspecified erectile dysfunction type          Care Instructions    At [facility], we strive to deliver an exceptional experience to you, every time we see you.  If you receive a survey in the mail, please send us back your thoughts. We really do value your feedback.    Based on your medical history, these are the current health maintenance/preventive care services that you are due for (some may have been done at this visit.)  Health Maintenance Due   Topic Date Due     FOOT EXAM Q1 YEAR  02/28/2018     MICROALBUMIN Q1 YEAR  02/28/2018     A1C Q6 MO  04/12/2018         Suggested websites for health information:  Www.Formatta.Tellybean : Up to date and easily searchable information on multiple topics.  Www.medlineplus.gov : medication info, interactive tutorials, watch real surgeries online  Www.familydoctor.org : good info from the Academy of Family Physicians  Www.cdc.gov : public health info, travel advisories, epidemics (H1N1)  Www.aap.org : children's health info, normal development, vaccinations  Www.health.state.[place].us : [place] dept of health, public health issues in [place], N1N1    Your care team:                            Family Medicine Internal Medicine   MD Mendoza Gates MD Shantel Branch-Fleming, MD Katya Georgiev PA-C Nam Ho, MD Pediatrics   Zan Velazquez PAARPAN Slaughter, CNP Kanwal Mcintosh APRN MD Lori Greene,  MD Mikala Moran APRN CNP      Clinic hours: Monday - Thursday 7 am-7 pm; Fridays 7 am-5 pm.   Urgent care: Monday - Friday 11 am-9 pm; Saturday and Sunday 9 am-5 pm.  Pharmacy : Monday -Thursday 8 am-8 pm; Friday 8 am-6 pm; Saturday and Sunday 9 am-5 pm.     Clinic: [phone]   Pharmacy: [phone]            Follow-ups after your visit        Follow-up notes from your care team     Return in about 3 months (around 6/29/2018) for diabetes.      Your next 10 appointments already scheduled     Aug 16, 2018  1:15 PM CDT   [facility]son [facility]    [address] [place] [address] [phone]              Who to contact     If you have questions or need follow up information about today's clinic visit or your schedule please contact [facility] directly at [phone].  Normal or non-critical lab and imaging results will be communicated to you by MyChart, letter or phone within 4 business days after the clinic has received the results. If you do not hear from us within 7 days, please contact the clinic through MyChart or phone. If you have a critical or abnormal lab result, we will notify you by phone as soon as possible.  Submit refill requests through [employer] or call your pharmacy and they will forward the refill request to us. Please allow 3 business days for your refill to be completed.          Additional Information About Your Visit        MyChart Information     [employer] gives you secure access to your electronic health record. If you see a primary care provider, you can also send messages to your care team and make appointments. If you have questions, please call your primary care clinic.  If you do not have a primary care provider, please call [phone] and they will assist you.        Care EveryWhere ID     This is your Care EveryWhere ID. This could be used by other  organizations to access your [place] medical records  [id number]        Your Vitals Were     Pulse Temperature Height Pulse Oximetry BMI (Body Mass Index)       84 98.8  F (37.1  C) (Oral) 6' (1.829 m) 95% 32.82 kg/m2        Blood Pressure from Last 3 Encounters:   03/29/18 134/82   10/12/17 126/75   04/20/17 131/81    Weight from Last 3 Encounters:   03/29/18 242 lb (109.8 kg)   10/12/17 235 lb (106.6 kg)   04/26/17 236 lb (107 kg)              We Performed the Following     ALT     Basic metabolic panel     FOOT EXAM  NO CHARGE [19955.114]     Hemoglobin A1c     Lipid panel reflex to direct LDL Non-fasting          Today's Medication Changes          These changes are accurate as of 3/29/18  1:41 PM.  If you have any questions, ask your nurse or doctor.               These medicines have changed or have updated prescriptions.        Dose/Directions    amLODIPine-benazepril 5-20 MG per capsule   Commonly known as:  LOTREL   This may have changed:  See the new instructions.   Used for:  Essential hypertension with goal blood pressure less than 140/90   Changed by:  Humberto Humphrey MD        Dose:  1 capsule   Take 1 capsule by mouth daily for blood pressure.   Quantity:  90 capsule   Refills:  1       metFORMIN 500 MG 24 hr tablet   Commonly known as:  GLUCOPHAGE-XR   This may have changed:  See the new instructions.   Used for:  Type 2 diabetes mellitus without complication, without long-term current use of insulin (H)   Changed by:  Humberto Humphrey MD        Dose:  1000 mg   Take 2 tablets (1,000 mg) by mouth 2 times daily (with meals) for diabetes.   Quantity:  360 tablet   Refills:  1            Where to get your medicines      These medications were sent to [facility]/pharmacy #[id number] - [place], [place] - [address]  [address], [facility] [address]     Phone:  [phone]     amLODIPine-benazepril 5-20 MG per capsule    metFORMIN 500 MG 24 hr tablet    niacin 500 MG CR tablet                 Primary Care Provider Office Phone # Fax #    Humberto Humphrey [phone]       [address]  [facility] [address]        Equal Access to Services     JOSE AMADOR : Hadii anushka ku hadjohannao Soomaali, waaxda luqadaha, qaybta kaalmada adeegyada, alessandra underwoodmaria g mukherjee. So [facility] [phone].    ATENCIÓN: Si habla español, tiene a ortega disposición servicios gratuitos de asistencia lingüística. Llame al [phone].    We comply with applicable federal civil rights laws and [place] laws. We do not discriminate on the basis of race, color, national origin, age, disability, sex, sexual orientation, or gender identity.            Thank you!     Thank you for choosing [facility]  for your care. Our goal is always to provide you with excellent care. Hearing back from our patients is one way we can continue to improve our services. Please take a few minutes to complete the written survey that you may receive in the mail after your visit with us. Thank you!             Your Updated Medication List - Protect others around you: Learn how to safely use, store and throw away your medicines at www.disposemymeds.org.          This list is accurate as of 3/29/18  1:41 PM.  Always use your most recent med list.                   Brand Name Dispense Instructions for use Diagnosis    amLODIPine-benazepril 5-20 MG per capsule    LOTREL    90 capsule    Take 1 capsule by mouth daily for blood pressure.    Essential hypertension with goal blood pressure less than 140/90       aspirin 81 MG tablet      1 TABLET DAILY*        diclofenac 50 MG EC tablet    VOLTAREN    60 tablet    Take 1 tablet (50 mg) by mouth 3 times daily as needed for moderate pain (toe) . Take with food.    Arthritis of first MTP joint       latanoprost 0.005 % ophthalmic solution    XALATAN    1 Bottle    Place 1 drop into both eyes At Bedtime    Open-angle glaucoma of both eyes, unspecified glaucoma stage,  unspecified open-angle glaucoma type       metFORMIN 500 MG 24 hr tablet    GLUCOPHAGE-XR    360 tablet    Take 2 tablets (1,000 mg) by mouth 2 times daily (with meals) for diabetes.    Type 2 diabetes mellitus without complication, without long-term current use of insulin (H)       niacin 500 MG CR tablet    NIASPAN    360 tablet    Take 4 tablets (2,000 mg) by mouth At Bedtime for cholesterol. Take with low-fat snack.    Hyperlipidemia LDL goal <100       STATIN NOT PRESCRIBED (INTENTIONAL)      Causes muscle aches

## 2018-03-29 NOTE — PROGRESS NOTES
SUBJECTIVE:   Ren Farrar is a 57 year old male who presents to clinic today for the following health issues:    Diabetes Follow-up    Patient is checking blood sugars: once daily.  Results are as follows:         am - 140 -150    Diabetic concerns: None     Symptoms of hypoglycemia (low blood sugar): none     Paresthesias (numbness or burning in feet) or sores: No     Date of last diabetic eye exam: February 2018    He has not exercised since last visit because of issues with his toe, and he thinks this is contributing to his poor diabetes control. The toe issue has not yet been solved, but he has bought a lot of shoe inserts and different shoes to try to help.    In the past, going on the keto diet helped him lower his HgbA1c, but he has not kept up this diet recently and states he eats too much.     Hyperlipidemia Follow-Up      Rate your low fat/cholesterol diet?: good    Taking statin?  Yes, no muscle aches from statin    Other lipid medications/supplements?:  none    Hypertension Follow-up      Outpatient blood pressures are being checked at home.  Results are 120/80.    Low Salt Diet: no added salt    BP Readings from Last 2 Encounters:   03/29/18 134/82   10/12/17 126/75     Hemoglobin A1C (%)   Date Value   03/29/2018 8.3 (H)   10/12/2017 7.1 (H)     LDL Cholesterol Calculated (mg/dL)   Date Value   10/12/2017 83   02/28/2017 78       Amount of exercise or physical activity: None    Problems taking medications regularly: No    Medication side effects: none    Diet: low salt      Past medical, family, and social histories, medications, and allergies are reviewed and updated in Epic.     ROS:  CONSTITUTIONAL: NEGATIVE for fever, chills, change in weight  ENT/MOUTH: NEGATIVE for ear, mouth and throat problems  RESP: NEGATIVE for significant cough or SOB  CV: NEGATIVE for chest pain, palpitations or peripheral edema  ROS otherwise negative    This document serves as a record of the services and decisions  personally performed and made by Dr. Humphrey. It was created on his behalf by Shagufta Gonzalez, a trained medical scribe. The creation of this document is based the provider's statements to the medical scribe.  Shagufta Gonzalez March 29, 2018 1:11 PM     OBJECTIVE:                                                    /82 (BP Location: Left arm, Patient Position: Chair, Cuff Size: Adult Regular)  Pulse 84  Temp 98.8  F (37.1  C) (Oral)  Ht 1.829 m (6')  Wt 109.8 kg (242 lb)  SpO2 95%  BMI 32.82 kg/m2   Body mass index is 32.82 kg/(m^2).     GENERAL: healthy, alert and no distress  EYES: Eyes grossly normal to inspection, PERRL, EOMI, sclerae white and conjunctivae normal  RESP: lungs clear to auscultation - no crackles or wheezes, no areas of dullness, no tachypnea  CV: Heart regular rate and rhythm without murmur, click or rub. No peripheral edema and peripheral pulses strong  MS: no gross musculoskeletal defects noted, no edema  SKIN: no suspicious lesions or rashes  NEURO: Normal strength and tone, sensory exam grossly normal, mentation intact, oriented times 3 and cranial nerves 2-12 intact  PSYCH: mentation appears normal, affect normal/bright   Diabetic foot exam: normal DP and PT pulses, no trophic changes or ulcerative lesions, normal sensory exam and normal monofilament exam     Diagnostic Test Results:  Results for orders placed or performed in visit on 03/29/18 (from the past 24 hour(s))   Hemoglobin A1c   Result Value Ref Range    Hemoglobin A1C 8.3 (H) 0 - 6.4 %      Lab Results   Component Value Date    A1C 7.1 10/12/2017    A1C 7.8 02/28/2017    A1C 7.5 04/07/2016    A1C 6.9 11/10/2015    A1C 6.9 12/16/2014        ASSESSMENT/PLAN:                                                      (E11.9) Type 2 diabetes mellitus without complication, without long-term current use of insulin (H)  (primary encounter diagnosis)  Comment: HgbA1c increased and no longer at goal, with associated decrease in  exercise and 7 lbs weight gain since last check. He prefers to work on diet and lifestyle changes rather than change a medication at this time.  Plan: Hemoglobin A1c, Albumin Random Urine         Quantitative with Creat Ratio, FOOT EXAM  NO         CHARGE [93652.114], metFORMIN (GLUCOPHAGE-XR)         500 MG 24 hr tablet        HgbA1c and weight graphs provided. Return in about 3 months (around 6/29/2018) for diabetes.     (E78.5) Hyperlipidemia LDL goal <100  Comment: historically at goal.  Plan: Lipid panel reflex to direct LDL Non-fasting,         ALT, niacin (NIASPAN) 500 MG CR tablet        Consider addition of ezetamibe (Zetia) if his LDL is not at goal. Follow up in 6 months.     (I10) Essential hypertension with goal blood pressure less than 140/90  Comment: Well controlled.  Plan: Basic metabolic panel, amLODIPine-benazepril         (LOTREL) 5-20 MG per capsule        Follow up in 6 months.     (N52.9) Erectile dysfunction, unspecified erectile dysfunction type  Comment: We reviewed the history of his ED treatment. His recollection is that he was switched from Viagra to Levitra due to side effects. Both of these meds appear to be on his formulary (not Cialis).   Plan: Patient wants to defer ED treatment at this time, hoping that improving his diabetes will solve the problem.        The information in this document, created by the medical scribe for me, accurately reflects the services I personally performed and the decisions made by me. I have reviewed and approved this document for accuracy prior to leaving the patient care area. March 29, 2018 1:11 PM   Humberto Humphrey MD

## 2018-03-29 NOTE — PATIENT INSTRUCTIONS
At [facility], we strive to deliver an exceptional experience to you, every time we see you.  If you receive a survey in the mail, please send us back your thoughts. We really do value your feedback.    Based on your medical history, these are the current health maintenance/preventive care services that you are due for (some may have been done at this visit.)  Health Maintenance Due   Topic Date Due     FOOT EXAM Q1 YEAR  02/28/2018     MICROALBUMIN Q1 YEAR 02/28/2018     A1C Q6 MO  04/12/2018         Suggested websites for health information:  Www.EDUonGo.org : Up to date and easily searchable information on multiple topics.  Www.[employer]gov : medication info, interactive tutorials, watch real surgeries online  Www.familydoctor.org : good info from the Academy of Family Physicians  Www.cdc.gov : public health info, travel advisories, epidemics (H1N1)  Www.aap.org : children's health info, normal development, vaccinations  Www.health.[facility].[place].us : [place] dept of health, public health issues in [place], N1N1    Your care team:                            Family Medicine Internal Medicine   MD Mendoza Gates MD Shantel Branch-Fleming, MD Katya Georgiev PA-C Nam Ho, MD Pediatrics   RAMONITA Peterson, MD Lori Tirado CNP, MD Deborah Mielke, MD Kim Thein, APRMICHELLE CNP      Clinic hours: Monday - Thursday 7 am-7 pm; Fridays 7 am-5 pm.   Urgent care: Monday - Friday 11 am-9 pm; Saturday and Sunday 9 am-5 pm.  Pharmacy : Monday -Thursday 8 am-8 pm; Friday 8 am-6 pm; Saturday and Sunday 9 am-5 pm.     Clinic: [phone]   Pharmacy: [phone]

## 2018-04-27 ENCOUNTER — RADIANT APPOINTMENT (OUTPATIENT)
Dept: GENERAL RADIOLOGY | Facility: CLINIC | Age: 58
End: 2018-04-27
Attending: FAMILY MEDICINE
Payer: COMMERCIAL

## 2018-04-27 ENCOUNTER — OFFICE VISIT (OUTPATIENT)
Dept: URGENT CARE | Facility: URGENT CARE | Age: 58
End: 2018-04-27
Payer: COMMERCIAL

## 2018-04-27 VITALS
BODY MASS INDEX: 32.13 KG/M2 | OXYGEN SATURATION: 96 % | HEIGHT: 72 IN | TEMPERATURE: 98.6 F | SYSTOLIC BLOOD PRESSURE: 132 MMHG | DIASTOLIC BLOOD PRESSURE: 78 MMHG | HEART RATE: 100 BPM | WEIGHT: 237.2 LBS | RESPIRATION RATE: 16 BRPM

## 2018-04-27 DIAGNOSIS — M25.511 CHRONIC RIGHT SHOULDER PAIN: Primary | ICD-10-CM

## 2018-04-27 DIAGNOSIS — M25.511 CHRONIC RIGHT SHOULDER PAIN: ICD-10-CM

## 2018-04-27 DIAGNOSIS — G89.29 CHRONIC RIGHT SHOULDER PAIN: Primary | ICD-10-CM

## 2018-04-27 DIAGNOSIS — M25.521 RIGHT ELBOW PAIN: ICD-10-CM

## 2018-04-27 DIAGNOSIS — G89.29 CHRONIC RIGHT SHOULDER PAIN: ICD-10-CM

## 2018-04-27 PROCEDURE — 99213 OFFICE O/P EST LOW 20 MIN: CPT | Performed by: FAMILY MEDICINE

## 2018-04-27 PROCEDURE — 73030 X-RAY EXAM OF SHOULDER: CPT | Mod: RT

## 2018-04-27 ASSESSMENT — PAIN SCALES - GENERAL: PAINLEVEL: SEVERE PAIN (7)

## 2018-04-27 NOTE — PROGRESS NOTES
SUBJECTIVE:   Ren Farrar is a 57 year old male who presents to clinic today for the following health issues:      Musculoskeletal problem/pain      Duration: 1 month    Description  Location: right shoulder, under scapula and behind right elbow    Intensity:  moderate    Accompanying signs and symptoms: radiation of pain to upper arm    History  Previous similar problem: YES (recurrent since )  Previous evaluation:  x-ray cervical spine, right and left shoulder: arthritis     Precipitating or alleviating factors:  Trauma or overuse: no   Aggravating factors include: none    Therapies tried and outcome: NSAID -     Works in computer         Problem list and histories reviewed & adjusted, as indicated.  Additional history: as documented    Patient Active Problem List   Diagnosis     DARIA (obstructive sleep apnea)     Type 2 diabetes mellitus without complication (H)     Hyperlipidemia LDL goal <100     Gastroesophageal reflux disease without esophagitis     Erectile dysfunction     Essential hypertension with goal blood pressure less than 140/90     Pain in joint, shoulder region     Obesity, Class I, BMI 30-34.9     Osteoarthritis of left hip     Nuclear sclerotic cataract of both eyes     Advanced directives, counseling/discussion     Adhesive capsulitis     Shoulder impingement     OAG (open angle glaucoma)     Anemia     Past Surgical History:   Procedure Laterality Date     NO HISTORY OF SURGERY         Social History   Substance Use Topics     Smoking status: Never Smoker     Smokeless tobacco: Never Used     Alcohol use Yes      Comment: occassioanally     Family History   Problem Relation Age of Onset     C.A.D. Mother      Myocardial Infarction Mother      Hypertension Mother      DIABETES Mother      borderline     Eye Disorder Mother      cataract     KIDNEY DISEASE Mother      dialysis     DIABETES Father      Hypertension Father      Heart Failure Father       CHF 81     DIABETES Sister       Thyroid Disease Sister      Depression Son 17     Asthma No family hx of      CEREBROVASCULAR DISEASE No family hx of      Breast Cancer No family hx of      Cancer - colorectal No family hx of      Prostate Cancer No family hx of      Allergies No family hx of      CANCER No family hx of      Glaucoma No family hx of      Macular Degeneration No family hx of          Current Outpatient Prescriptions   Medication Sig Dispense Refill     amLODIPine-benazepril (LOTREL) 5-20 MG per capsule Take 1 capsule by mouth daily for blood pressure. 90 capsule 1     ASPIRIN 81 MG PO TABS 1 TABLET DAILY*  prn     diclofenac (VOLTAREN) 50 MG EC tablet Take 1 tablet (50 mg) by mouth 3 times daily as needed for moderate pain (toe) . Take with food. 60 tablet 1     latanoprost (XALATAN) 0.005 % ophthalmic solution Place 1 drop into both eyes At Bedtime 1 Bottle 12     metFORMIN (GLUCOPHAGE-XR) 500 MG 24 hr tablet Take 2 tablets (1,000 mg) by mouth 2 times daily (with meals) for diabetes. 360 tablet 1     niacin (NIASPAN) 500 MG CR tablet Take 4 tablets (2,000 mg) by mouth At Bedtime for cholesterol. Take with low-fat snack. 360 tablet 1     STATIN NOT PRESCRIBED, INTENTIONAL, Causes muscle aches       Allergies   Allergen Reactions     Atorvastatin Calcium      Myalgias, increased CPK     Simvastatin      myalgias     Recent Labs   Lab Test  03/29/18   1249  10/12/17   1222  02/28/17   0913   11/10/15   1435   A1C  8.3*  7.1*  7.8*   < >  6.9*   LDL  83  83  78   --   69   HDL  70  67  72   --   75   TRIG  63  46  54   --   75   ALT  25  24  21   < >  29   CR  0.84  0.95  0.94   < >  0.88   GFRESTIMATED  >90  81  82   < >  89   GFRESTBLACK  >90  >90  >90  African American GFR Calc     < >  >90   GFR Calc     POTASSIUM  3.9  4.0  3.9   < >  3.8   TSH   --   0.27*   --    --   0.45    < > = values in this interval not displayed.      BP Readings from Last 3 Encounters:   04/27/18 132/78   03/29/18 134/82   10/12/17  "126/75    Wt Readings from Last 3 Encounters:   04/27/18 237 lb 3.2 oz (107.6 kg)   03/29/18 242 lb (109.8 kg)   10/12/17 235 lb (106.6 kg)                  Labs reviewed in EPIC    Reviewed and updated as needed this visit by clinical staff  Tobacco  Allergies  Meds  Med Hx  Surg Hx  Fam Hx  Soc Hx      Reviewed and updated as needed this visit by Provider         ROS:  Constitutional, HEENT, cardiovascular, pulmonary, gi and gu systems are negative, except as otherwise noted.    OBJECTIVE:     /78 (BP Location: Left arm, Patient Position: Sitting, Cuff Size: Adult Regular)  Pulse 100  Temp 98.6  F (37  C) (Oral)  Resp 16  Ht 5' 11.89\" (1.826 m)  Wt 237 lb 3.2 oz (107.6 kg)  SpO2 96%  BMI 32.27 kg/m2  Body mass index is 32.27 kg/(m^2).  GENERAL: alert, no distress and obese  NECK: no adenopathy, no asymmetry, masses, or scars and thyroid normal to palpation  RESP: lungs clear to auscultation - no rales, rhonchi or wheezes  CV: regular rates and rhythm, normal S1 S2, no S3 or S4 and no murmur, click or rub  ABDOMEN: soft, nontender, no hepatosplenomegaly, no masses and bowel sounds normal  MS: mildly tender right shoulder, no skin discoloration, swelling or warmth noted, normal elbow exam, ROM normal  NEURO: Normal strength and tone, mentation intact and speech normal    XR SHOULDER RT G/E 3 VW 4/27/2018 5:29 PM     HISTORY: Pain.     COMPARISON: November 30, 2011.         IMPRESSION: No evidence of acute fracture or malalignment.      ASSESSMENT/PLAN:         ICD-10-CM    1. Chronic right shoulder pain M25.511 XR Shoulder Right G/E 3 Views    G89.29 SPORTS MEDICINE REFERRAL   2. Right elbow pain M25.521        57-year-old male presents with right shoulder, scapular and right elbow pain, experiencing intermittently since 2013, worsened for about 1 month.  Previous x-rays remarkable arthritic changes involving cervical spine and shoulder joint.  Physical examination remarkable for mildly tender " right anterior shoulder without any swelling, skin discoloration or warmth, range of movement normal.  Differentials are broad, suspect symptoms secondary to osteoarthritis.  Patient has had physical therapy and shoulder steroidal injection in the past.  X-ray right shoulder today showed no evidence of acute fracture or malalignment.  Suggested to continue over-the-counter analgesia, icing and activity as tolerated.  Sports medicine referral placed for further review and recommendations.  Patient understood and in agreement with above plan.  All questions answered.      Patient Instructions     Osteoarthritis  Osteoarthritis (also called degenerative joint disease) happens when the cartilage in a joint becomes damaged and worn. This may be due to age, wear and tear, overuse of the joint, or other problems. Osteoarthritis can affect any joint. But it is most common in hands, knees, spine, hips, and feet. Symptoms include joint stiffness, pain, and swelling.  Home care    When a joint is more sore than usual, rest it for a day or two.    Heat can help relieve stiffness. Take a hot bath or apply a heating pad for up to 30 minutes at a time. If symptoms are worse in the morning, using heat just after awakening can help relax the muscle and soothe the joints.     Ice helps relieve pain and swelling. It is often used after activity. Use a cold pack wrapped in a thin cloth on the joint for 10 to 15 minutes at a time.     Alternating hot and cold can also help relieve pain. Try this for 20 minutes at a time, several times per day.    Exercise helps prevent the muscles and ligaments around the joint from becoming weak. It also helps maintain function in the joint.  Be as active as you can. Talk to your healthcare provider about what activity program is best for you.    Excess weight puts a lot of extra strain on weight-bearing joints of the lower back, hips, knees, feet and ankles. If you are overweight, talk to your  healthcare provider about a safe and effective weight loss program.    Use anti-inflammatory medicines as prescribed for pain. This includes acetaminophen or NSAIDs such as ibuprofen or naproxen. If needed, topical or injected medicines may be recommended. Talk to your healthcare provider if these options are not enough to manage your pain.    Talk with your healthcare provider about devices that might help improve your function and reduce pain.  Follow-up care  Follow up with your healthcare provider as advised by our staff.  When to seek medical advice  Call your healthcare provider right away if any of these occur:    Redness or swelling of a painful joint    Discharge or pus from a painful joint    Fever of 100.4 F (38 C) or higher, or as directed by your healthcare provider    Worsening joint pain    Decreased ability to move the joint or bear weight on the joint  Date Last Reviewed: 3/1/2017    0282-7023 [employer]. [address] [place] [address]. All rights reserved. This information is not intended as a substitute for professional medical care. Always follow your healthcare professional's instructions.            Isael Bridges MD  [facility]

## 2018-04-27 NOTE — PATIENT INSTRUCTIONS
Osteoarthritis  Osteoarthritis (also called degenerative joint disease) happens when the cartilage in a joint becomes damaged and worn. This may be due to age, wear and tear, overuse of the joint, or other problems. Osteoarthritis can affect any joint. But it is most common in hands, knees, spine, hips, and feet. Symptoms include joint stiffness, pain, and swelling.  Home care    When a joint is more sore than usual, rest it for a day or two.    Heat can help relieve stiffness. Take a hot bath or apply a heating pad for up to 30 minutes at a time. If symptoms are worse in the morning, using heat just after awakening can help relax the muscle and soothe the joints.     Ice helps relieve pain and swelling. It is often used after activity. Use a cold pack wrapped in a thin cloth on the joint for 10 to 15 minutes at a time.     Alternating hot and cold can also help relieve pain. Try this for 20 minutes at a time, several times per day.    Exercise helps prevent the muscles and ligaments around the joint from becoming weak. It also helps maintain function in the joint.  Be as active as you can. Talk to your healthcare provider about what activity program is best for you.    Excess weight puts a lot of extra strain on weight-bearing joints of the lower back, hips, knees, feet and ankles. If you are overweight, talk to your healthcare provider about a safe and effective weight loss program.    Use anti-inflammatory medicines as prescribed for pain. This includes acetaminophen or NSAIDs such as ibuprofen or naproxen. If needed, topical or injected medicines may be recommended. Talk to your healthcare provider if these options are not enough to manage your pain.    Talk with your healthcare provider about devices that might help improve your function and reduce pain.  Follow-up care  Follow up with your healthcare provider as advised by our staff.  When to seek medical advice  Call your healthcare provider right away if  any of these occur:    Redness or swelling of a painful joint    Discharge or pus from a painful joint    Fever of 100.4 F (38 C) or higher, or as directed by your healthcare provider    Worsening joint pain    Decreased ability to move the joint or bear weight on the joint  Date Last Reviewed: 3/1/2017    7228-1938 [employer]. [address] [place] [address]. All rights reserved. This information is not intended as a substitute for professional medical care. Always follow your healthcare professional's instructions.

## 2018-04-27 NOTE — MR AVS SNAPSHOT
After Visit Summary   4/27/2018    Ren Farrar    MRN: [id number]           Patient Information     Date Of Birth          1960        Visit Information        Provider Department      4/27/2018 4:30 PM Isael Bridges MD [facility]        Today's Diagnoses     Chronic right shoulder pain    -  1      Care Instructions      Osteoarthritis  Osteoarthritis (also called degenerative joint disease) happens when the cartilage in a joint becomes damaged and worn. This may be due to age, wear and tear, overuse of the joint, or other problems. Osteoarthritis can affect any joint. But it is most common in hands, knees, spine, hips, and feet. Symptoms include joint stiffness, pain, and swelling.  Home care    When a joint is more sore than usual, rest it for a day or two.    Heat can help relieve stiffness. Take a hot bath or apply a heating pad for up to 30 minutes at a time. If symptoms are worse in the morning, using heat just after awakening can help relax the muscle and soothe the joints.     Ice helps relieve pain and swelling. It is often used after activity. Use a cold pack wrapped in a thin cloth on the joint for 10 to 15 minutes at a time.     Alternating hot and cold can also help relieve pain. Try this for 20 minutes at a time, several times per day.    Exercise helps prevent the muscles and ligaments around the joint from becoming weak. It also helps maintain function in the joint.  Be as active as you can. Talk to your healthcare provider about what activity program is best for you.    Excess weight puts a lot of extra strain on weight-bearing joints of the lower back, hips, knees, feet and ankles. If you are overweight, talk to your healthcare provider about a safe and effective weight loss program.    Use anti-inflammatory medicines as prescribed for pain. This includes acetaminophen or NSAIDs such as ibuprofen or naproxen. If needed, topical or injected medicines may  be recommended. Talk to your healthcare provider if these options are not enough to manage your pain.    Talk with your healthcare provider about devices that might help improve your function and reduce pain.  Follow-up care  Follow up with your healthcare provider as advised by our staff.  When to seek medical advice  Call your healthcare provider right away if any of these occur:    Redness or swelling of a painful joint    Discharge or pus from a painful joint    Fever of 100.4 F (38 C) or higher, or as directed by your healthcare provider    Worsening joint pain    Decreased ability to move the joint or bear weight on the joint  Date Last Reviewed: 3/1/2017    6575-1456 [employer]. [address] [place] [address]. All rights reserved. This information is not intended as a substitute for professional medical care. Always follow your healthcare professional's instructions.                Follow-ups after your visit        Additional Services     SPORTS MEDICINE REFERRAL       Your provider has referred you to:  [facility]: [facility] [phone]   [email or web address]    Please be aware that coverage of these services is subject to the terms and limitations of your health insurance plan.  Call member services at your health plan with any benefit or coverage questions.      Please bring the following to your appointment:    >>   Any x-rays, CTs or MRIs which have been performed.  Contact the facility where they were done to arrange for  prior to your scheduled appointment.    >>   List of current medications   >>   This referral request   >>   Any documents/labs given to you for this referral                  Your next 10 appointments already scheduled     Aug 16, 2018  1:15 PM CDT   Return Visit with Katherine Danielson MD   [facility] [place] ([facility] [place])    [address]  [place] [address]  "  [phone]              Who to contact     If you have questions or need follow up information about today's clinic visit or your schedule please contact [facility] [place] directly at [phone].  Normal or non-critical lab and imaging results will be communicated to you by MyChart, letter or phone within 4 business days after the clinic has received the results. If you do not hear from us within 7 days, please contact the clinic through MyChart or phone. If you have a critical or abnormal lab result, we will notify you by phone as soon as possible.  Submit refill requests through happin! or call your pharmacy and they will forward the refill request to us. Please allow 3 business days for your refill to be completed.          Additional Information About Your Visit        [employer] Information     happin! gives you secure access to your electronic health record. If you see a primary care provider, you can also send messages to your care team and make appointments. If you have questions, please call your primary care clinic.  If you do not have a primary care provider, please call [phone] and they will assist you.        Care EveryWhere ID     This is your Care EveryWhere ID. This could be used by other organizations to access your [place] medical records  [id number]        Your Vitals Were     Pulse Temperature Respirations Height Pulse Oximetry BMI (Body Mass Index)    100 98.6  F (37  C) (Oral) 16 5' 11.89\" (1.826 m) 96% 32.27 kg/m2       Blood Pressure from Last 3 Encounters:   04/27/18 132/78   03/29/18 134/82   10/12/17 126/75    Weight from Last 3 Encounters:   04/27/18 237 lb 3.2 oz (107.6 kg)   03/29/18 242 lb (109.8 kg)   10/12/17 235 lb (106.6 kg)              We Performed the Following     SPORTS MEDICINE REFERRAL        Primary Care Provider Office Phone # Fax #    Humberto Humphrey [phone]       [address]  [place] [address]        Equal Access " to Services     JOSE AMADOR : Flaquita Foley, wajammieda kurtqadaha, qaybta kaalmaswathi gallegos, alessandra mukherjee. So [facility] [phone].    ATENCIÓN: Si shanthi dexter, tiene a ortega disposición servicios gratuitos de asistencia lingüística. Llame al [phone].    We comply with applicable federal civil rights laws and [place] laws. We do not discriminate on the basis of race, color, national origin, age, disability, sex, sexual orientation, or gender identity.            Thank you!     Thank you for choosing [facility]  for your care. Our goal is always to provide you with excellent care. Hearing back from our patients is one way we can continue to improve our services. Please take a few minutes to complete the written survey that you may receive in the mail after your visit with us. Thank you!             Your Updated Medication List - Protect others around you: Learn how to safely use, store and throw away your medicines at www.disposemymeds.org.          This list is accurate as of 4/27/18  5:43 PM.  Always use your most recent med list.                   Brand Name Dispense Instructions for use Diagnosis    amLODIPine-benazepril 5-20 MG per capsule    LOTREL    90 capsule    Take 1 capsule by mouth daily for blood pressure.    Essential hypertension with goal blood pressure less than 140/90       aspirin 81 MG tablet      1 TABLET DAILY*        diclofenac 50 MG EC tablet    VOLTAREN    60 tablet    Take 1 tablet (50 mg) by mouth 3 times daily as needed for moderate pain (toe) . Take with food.    Arthritis of first MTP joint       latanoprost 0.005 % ophthalmic solution    XALATAN    1 Bottle    Place 1 drop into both eyes At Bedtime    Open-angle glaucoma of both eyes, unspecified glaucoma stage, unspecified open-angle glaucoma type       metFORMIN 500 MG 24 hr tablet    GLUCOPHAGE-XR    360 tablet    Take 2 tablets (1,000 mg) by mouth 2 times daily (with  meals) for diabetes.    Type 2 diabetes mellitus without complication, without long-term current use of insulin (H)       niacin 500 MG CR tablet    NIASPAN    360 tablet    Take 4 tablets (2,000 mg) by mouth At Bedtime for cholesterol. Take with low-fat snack.    Hyperlipidemia LDL goal <100       STATIN NOT PRESCRIBED (INTENTIONAL)      Causes muscle aches

## 2018-08-16 ENCOUNTER — OFFICE VISIT (OUTPATIENT)
Dept: OPHTHALMOLOGY | Facility: CLINIC | Age: 58
End: 2018-08-16
Payer: COMMERCIAL

## 2018-08-16 ENCOUNTER — TELEPHONE (OUTPATIENT)
Dept: OPHTHALMOLOGY | Facility: CLINIC | Age: 58
End: 2018-08-16

## 2018-08-16 DIAGNOSIS — H40.1131 PRIMARY OPEN ANGLE GLAUCOMA OF BOTH EYES, MILD STAGE: Primary | ICD-10-CM

## 2018-08-16 PROCEDURE — 92083 EXTENDED VISUAL FIELD XM: CPT | Performed by: STUDENT IN AN ORGANIZED HEALTH CARE EDUCATION/TRAINING PROGRAM

## 2018-08-16 PROCEDURE — 92133 CPTRZD OPH DX IMG PST SGM ON: CPT | Performed by: STUDENT IN AN ORGANIZED HEALTH CARE EDUCATION/TRAINING PROGRAM

## 2018-08-16 PROCEDURE — 92012 INTRM OPH EXAM EST PATIENT: CPT | Performed by: STUDENT IN AN ORGANIZED HEALTH CARE EDUCATION/TRAINING PROGRAM

## 2018-08-16 ASSESSMENT — VISUAL ACUITY
OD_CC: 20/40
OS_CC: 20/50
CORRECTION_TYPE: GLASSES
OD_PH_CC: 20/25-2
OS_PH_CC: 20/40-1
OD_CC+: +1
METHOD: SNELLEN - LINEAR

## 2018-08-16 ASSESSMENT — CUP TO DISC RATIO
OS_RATIO: 0.7 UD
OD_RATIO: 0.65 UD

## 2018-08-16 ASSESSMENT — SLIT LAMP EXAM - LIDS
COMMENTS: NORMAL
COMMENTS: NORMAL

## 2018-08-16 ASSESSMENT — TONOMETRY
IOP_METHOD: APPLANATION
OD_IOP_MMHG: 24
OS_IOP_MMHG: 24

## 2018-08-16 ASSESSMENT — EXTERNAL EXAM - RIGHT EYE: OD_EXAM: NORMAL

## 2018-08-16 ASSESSMENT — EXTERNAL EXAM - LEFT EYE: OS_EXAM: NORMAL

## 2018-08-16 NOTE — LETTER
8/16/2018         RE: Ren Farrar  [address]  [place] [address]        Dear Colleague,    Thank you for referring your patient, Ren Farrar, to the [facility]. Please see a copy of my visit note below.     Current Eye Medications:  Latanoprost nightly both eyes, has been better about taking drops the last two weeks, only forgot last night.     Subjective:  Here for OCT and HVF today for glaucoma.  DM, last a1c was 8.3 on 3-29-18, up from 7.1 in 2017.  He admits to being lazy and doesn't exercise.  Doesn't want to do the shots.      Objective:  See Ophthalmology Exam.       Assessment:      Plan:   See Patient Instructions.         Again, thank you for allowing me to participate in the care of your patient.        Sincerely,        Katherine Danielson MD

## 2018-08-16 NOTE — PROGRESS NOTES
Current Eye Medications:  Latanoprost nightly both eyes, has been better about taking drops the last two weeks, only forgot last night.     Subjective:  Here for OCT and HVF today for glaucoma.  DM, last a1c was 8.3 on 3-29-18, up from 7.1 in 2017.       Objective:  See Ophthalmology Exam.       Assessment:  Ren Farrar is a 58 year old male who presents with:   Encounter Diagnosis   Name Primary?     Primary open angle glaucoma of both eyes, mild stage Intraocular pressure still too high in both eyes on latanoprost. Drop compliance issues, so recommend Selected laser trabeculoplasty (SLT) right eye, then probably left eye.       Plan:  Continue Latanoprost (teal top) at bedtime both eyes.  Recommend scheduling Selected laser trabeculoplasty (SLT) right eye. Please call [facility] at [phone] to schedule.     Katherine Danielson MD  [phone]

## 2018-08-16 NOTE — MR AVS SNAPSHOT
After Visit Summary   8/16/2018    Ren Farrar    MRN: [id number]           Patient Information     Date Of Birth          1960        Visit Information        Provider Department      8/16/2018 1:15 PM Katherine Danielson MD [facility]        Today's Diagnoses     Primary open angle glaucoma of both eyes, mild stage    -  1      Care Instructions    Continue Latanoprost (teal top) at bedtime both eyes.  Recommend scheduling Selected laser trabeculoplasty (SLT) right eye. Please call Royal NOLAN at [phone] to schedule.     Katherine Danielson MD  [phone]            Follow-ups after your visit        Follow-up notes from your care team     Return in about 6 months (around 2/16/2019) for Complete Exam.      Who to contact     If you have questions or need follow up information about today's clinic visit or your schedule please contact [facility] directly at [phone].  Normal or non-critical lab and imaging results will be communicated to you by Baccarathart, letter or phone within 4 business days after the clinic has received the results. If you do not hear from us within 7 days, please contact the clinic through Satelliert or phone. If you have a critical or abnormal lab result, we will notify you by phone as soon as possible.  Submit refill requests through Virtusize or call your pharmacy and they will forward the refill request to us. Please allow 3 business days for your refill to be completed.          Additional Information About Your Visit        MyChart Information     Virtusize gives you secure access to your electronic health record. If you see a primary care provider, you can also send messages to your care team and make appointments. If you have questions, please call your primary care clinic.  If you do not have a primary care provider, please call [phone] and they will assist you.        Care EveryWhere ID     This is your Care EveryWhere ID. This could  be used by other organizations to access your [facility] medical records  [id number]         Blood Pressure from Last 3 Encounters:   04/27/18 132/78   03/29/18 134/82   10/12/17 126/75    Weight from Last 3 Encounters:   04/27/18 107.6 kg (237 lb 3.2 oz)   03/29/18 109.8 kg (242 lb)   10/12/17 106.6 kg (235 lb)              We Performed the Following     HC COMPUTERIZED OPHTHALMIC IMAGING OPTIC NERVE     VISUAL FIELDS EXAM (EXTENDED)        Primary Care Provider Office Phone # Fax #    Humberto Humphrey [phone]       [address]  [facility] [address]        Equal Access to Services     ROBIN AMADOR : Hadii aad ku hadasho Soomaali, waaxda luqadaha, qaybta kaalmada adeegyada, waxay kokiin haymaria g berg . So [facility] [phone].    ATENCIÓN: Si habla español, tiene a ortega disposición servicios gratuitos de asistencia lingüística. [facility] [phone].    We comply with applicable federal civil rights laws and [place] laws. We do not discriminate on the basis of race, color, national origin, age, disability, sex, sexual orientation, or gender identity.            Thank you!     Thank you for choosing [facility]  for your care. Our goal is always to provide you with excellent care. Hearing back from our patients is one way we can continue to improve our services. Please take a few minutes to complete the written survey that you may receive in the mail after your visit with us. Thank you!             Your Updated Medication List - Protect others around you: Learn how to safely use, store and throw away your medicines at www.disposemymeds.org.          This list is accurate as of 8/16/18  2:11 PM.  Always use your most recent med list.                   Brand Name Dispense Instructions for use Diagnosis    amLODIPine-benazepril 5-20 MG per capsule    LOTREL    90 capsule    Take 1 capsule by mouth daily for blood pressure.    Essential hypertension with goal blood  pressure less than 140/90       aspirin 81 MG tablet      1 TABLET DAILY*        diclofenac 50 MG EC tablet    VOLTAREN    60 tablet    Take 1 tablet (50 mg) by mouth 3 times daily as needed for moderate pain (toe) . Take with food.    Arthritis of first MTP joint       latanoprost 0.005 % ophthalmic solution    XALATAN    1 Bottle    Place 1 drop into both eyes At Bedtime    Open-angle glaucoma of both eyes, unspecified glaucoma stage, unspecified open-angle glaucoma type       metFORMIN 500 MG 24 hr tablet    GLUCOPHAGE-XR    360 tablet    Take 2 tablets (1,000 mg) by mouth 2 times daily (with meals) for diabetes.    Type 2 diabetes mellitus without complication, without long-term current use of insulin (H)       niacin 500 MG CR tablet    NIASPAN    360 tablet    Take 4 tablets (2,000 mg) by mouth At Bedtime for cholesterol. Take with low-fat snack.    Hyperlipidemia LDL goal <100       STATIN NOT PRESCRIBED (INTENTIONAL)      Causes muscle aches

## 2018-08-16 NOTE — PATIENT INSTRUCTIONS
Continue Latanoprost (teal top) at bedtime both eyes.  Recommend scheduling Selected laser trabeculoplasty (SLT) right eye. Please call Royal NOLAN at [phone] to schedule.     Katherine Danielson MD  [phone]

## 2018-08-16 NOTE — TELEPHONE ENCOUNTER
Type of surgery: Select LAser trabeculectomy  Cpt 45978-03  Location of surgery: Other: [facility]  Date and time of surgery: 08/21/2018 @ 1:00pm  Surgeon: Dr. Danielson  Pre-Op Appt Date: 08/16/2018  Post-Op Appt Date: 08/28/2018   Packet sent out: Yes  Pre-cert/Authorization completed:  No prior auth required per Asuragenna - [place] is [id number]. Asuragenmelissa is okay for [facility].   Date: 08/16/2018    Insurance is valid.

## 2018-08-28 ENCOUNTER — OFFICE VISIT (OUTPATIENT)
Dept: OPHTHALMOLOGY | Facility: CLINIC | Age: 58
End: 2018-08-28
Payer: COMMERCIAL

## 2018-08-28 DIAGNOSIS — H40.1131 PRIMARY OPEN ANGLE GLAUCOMA OF BOTH EYES, MILD STAGE: Primary | ICD-10-CM

## 2018-08-28 PROCEDURE — 99024 POSTOP FOLLOW-UP VISIT: CPT | Performed by: STUDENT IN AN ORGANIZED HEALTH CARE EDUCATION/TRAINING PROGRAM

## 2018-08-28 ASSESSMENT — VISUAL ACUITY
METHOD: SNELLEN - LINEAR
OS_CC+: -2
OD_CC+: +2
OD_CC: 20/30
CORRECTION_TYPE: GLASSES
OS_CC: 20/40

## 2018-08-28 ASSESSMENT — TONOMETRY
OD_IOP_MMHG: 16
OD_IOP_MMHG: 18
OS_IOP_MMHG: 14
OS_IOP_MMHG: 17
IOP_METHOD: ICARE
IOP_METHOD: APPLANATION

## 2018-08-28 ASSESSMENT — EXTERNAL EXAM - LEFT EYE: OS_EXAM: NORMAL

## 2018-08-28 ASSESSMENT — SLIT LAMP EXAM - LIDS
COMMENTS: NORMAL
COMMENTS: NORMAL

## 2018-08-28 ASSESSMENT — EXTERNAL EXAM - RIGHT EYE: OD_EXAM: NORMAL

## 2018-08-28 ASSESSMENT — CUP TO DISC RATIO
OS_RATIO: 0.7 UD
OD_RATIO: 0.65 UD

## 2018-08-28 NOTE — PROGRESS NOTES
Current Eye Medications:  Latanoprost both eyes every evening.  Last drops:  12am.       Subjective:  Status/Post SLT right eye:  8-21-18.  For three days following the laser surgery, he had a right-sided headache and watering.  The watering has persisted.  No vision changes or other concerns.       Objective:  See Ophthalmology Exam.      Assessment:  Ren Farrar is a 58 year old male who presents with:   Encounter Diagnosis   Name Primary?     Primary open angle glaucoma of both eyes, mild stage; s/p SLT #1 OD 8/2018 Anterior chamber is deep and quiet.        Plan:  Continue latanoprost at bedtime both eyes     Katherine Danielson MD  [phone]

## 2018-08-28 NOTE — MR AVS SNAPSHOT
After Visit Summary   8/28/2018    Ren Farrar    MRN: [id number]           Patient Information     Date Of Birth          1960        Visit Information        Provider Department      8/28/2018 11:00 AM Katherine Danielson MD [facility]        Today's Diagnoses     Primary open angle glaucoma of both eyes, mild stage; s/p SLT #1 OD 8/2018    -  1      Care Instructions    Continue latanoprost at bedtime both eyes     Katherine Danielson MD  [phone]            Follow-ups after your visit        Follow-up notes from your care team     Return in about 2 months (around 10/28/2018) for IOP check.      Your next 10 appointments already scheduled     Feb 18, 2019  2:00 PM CST   New Visit with Katherine Danielson MD   [facility]    [address] [place] [address] [phone]              Who to contact     If you have questions or need follow up information about today's clinic visit or your schedule please contact [facility] directly at [phone].  Normal or non-critical lab and imaging results will be communicated to you by MyChart, letter or phone within 4 business days after the clinic has received the results. If you do not hear from us within 7 days, please contact the clinic through Graphdivehart or phone. If you have a critical or abnormal lab result, we will notify you by phone as soon as possible.  Submit refill requests through Home Inventory S[pecialists or call your pharmacy and they will forward the refill request to us. Please allow 3 business days for your refill to be completed.          Additional Information About Your Visit        MyChart Information     Home Inventory S[pecialists gives you secure access to your electronic health record. If you see a primary care provider, you can also send messages to your care team and make appointments. If you have questions, please call your primary care clinic.  If you do not have a primary care  provider, please call [phone] and they will assist you.        Care EveryWhere ID     This is your Care EveryWhere ID. This could be used by other organizations to access your [facility] medical records  [id number]         Blood Pressure from Last 3 Encounters:   04/27/18 132/78   03/29/18 134/82   10/12/17 126/75    Weight from Last 3 Encounters:   04/27/18 107.6 kg (237 lb 3.2 oz)   03/29/18 109.8 kg (242 lb)   10/12/17 106.6 kg (235 lb)              Today, you had the following     No orders found for display       Primary Care Provider Office Phone # Fax #    Humberto Humphrey [phone]       [address]  [facility] [address]        Equal Access to Services     [facility]: Hadii aad ku hadasho Soomaali, waaxda luqadaha, qaybta kaalmada adeegyada, alessandra hope haymaria g berg . So [facility] [phone].    ATENCIÓN: Si habla español, tiene a ortega disposición servicios gratuitos de asistencia lingüística. [facility] al [phone].    We comply with applicable federal civil rights laws and [place] laws. We do not discriminate on the basis of race, color, national origin, age, disability, sex, sexual orientation, or gender identity.            Thank you!     Thank you for choosing [facility]  for your care. Our goal is always to provide you with excellent care. Hearing back from our patients is one way we can continue to improve our services. Please take a few minutes to complete the written survey that you may receive in the mail after your visit with us. Thank you!             Your Updated Medication List - Protect others around you: Learn how to safely use, store and throw away your medicines at www.disposemymeds.org.          This list is accurate as of 8/28/18 11:46 AM.  Always use your most recent med list.                   Brand Name Dispense Instructions for use Diagnosis    amLODIPine-benazepril 5-20 MG per capsule    LOTREL    90 capsule    Take 1  capsule by mouth daily for blood pressure.    Essential hypertension with goal blood pressure less than 140/90       aspirin 81 MG tablet      1 TABLET DAILY*        diclofenac 50 MG EC tablet    VOLTAREN    60 tablet    Take 1 tablet (50 mg) by mouth 3 times daily as needed for moderate pain (toe) . Take with food.    Arthritis of first MTP joint       latanoprost 0.005 % ophthalmic solution    XALATAN    1 Bottle    Place 1 drop into both eyes At Bedtime    Open-angle glaucoma of both eyes, unspecified glaucoma stage, unspecified open-angle glaucoma type       metFORMIN 500 MG 24 hr tablet    GLUCOPHAGE-XR    360 tablet    Take 2 tablets (1,000 mg) by mouth 2 times daily (with meals) for diabetes.    Type 2 diabetes mellitus without complication, without long-term current use of insulin (H)       niacin 500 MG CR tablet    NIASPAN    360 tablet    Take 4 tablets (2,000 mg) by mouth At Bedtime for cholesterol. Take with low-fat snack.    Hyperlipidemia LDL goal <100       STATIN NOT PRESCRIBED (INTENTIONAL)      Causes muscle aches

## 2018-08-28 NOTE — LETTER
8/28/2018         RE: Ren Farrar  [address]  [place] [address]        Dear Colleague,    Thank you for referring your patient, Ren Farrar, to the [facility].     His eye exam is stable.  Please see a copy of my visit note below.     Current Eye Medications:  Latanoprost both eyes every evening.  Last drops:  12am.       Subjective:  Status/Post SLT right eye:  8-21-18.  For three days following the laser surgery, he had a right-sided headache and watering.  The watering has persisted.  No vision changes or other concerns.       Objective:  See Ophthalmology Exam.      Assessment:  Ren Farrar is a 58 year old male who presents with:   Encounter Diagnosis   Name Primary?     Primary open angle glaucoma of both eyes, mild stage; s/p SLT #1 OD 8/2018 Anterior chamber is deep and quiet.        Plan:  Continue latanoprost at bedtime both eyes     Katherine Danielson MD  [phone]        Again, thank you for allowing me to participate in the care of your patient.        Sincerely,        Katherine Danielson MD

## 2018-10-03 DIAGNOSIS — I10 ESSENTIAL HYPERTENSION WITH GOAL BLOOD PRESSURE LESS THAN 140/90: ICD-10-CM

## 2018-10-03 RX ORDER — AMLODIPINE AND BENAZEPRIL HYDROCHLORIDE 5; 20 MG/1; MG/1
CAPSULE ORAL
Qty: 90 CAPSULE | Refills: 1 | Status: SHIPPED | OUTPATIENT
Start: 2018-10-03 | End: 2018-11-29

## 2018-10-03 NOTE — TELEPHONE ENCOUNTER
"Requested Prescriptions   Pending Prescriptions Disp Refills     amLODIPine-benazepril (LOTREL) 5-20 MG per capsule [Pharmacy Med Name: AMLODIPINE-BENAZEPRIL 5-20 MG]  Last Written Prescription Date:  03/29/18  Last Fill Quantity: 90,  # refills: 1   Last Office Visit with FMNENO, GILBERTO or [facility] prescribing provider:  03/29/18Lefty   Future Office Visit:    Next 5 appointments (look out 90 days)     Oct 30, 2018 11:00 AM CDT   Return Visit with Katherine Danielson MD   [facility] ([facility]    [address] [place] [address]   [phone]                90 capsule 1     Sig: TAKE 1 CAPSULE BY MOUTH DAILY FOR BLOOD PRESSURE.    Calcium Channel Blockers Protocol  Passed    10/3/2018  2:27 AM       Passed - Blood pressure under 140/90 in past 12 months    BP Readings from Last 3 Encounters:   04/27/18 132/78   03/29/18 134/82   10/12/17 126/75                Passed - Recent (12 mo) or future (30 days) visit within the authorizing provider's specialty    Patient had office visit in the last 12 months or has a visit in the next 30 days with authorizing provider or within the authorizing provider's specialty.  See \"Patient Info\" tab in inbasket, or \"Choose Columns\" in Meds & Orders section of the refill encounter.           Passed - Patient is age 18 or older       Passed - Normal serum creatinine on file in past 12 months    Recent Labs   Lab Test  03/29/18   1249   CR  0.84               "

## 2018-10-03 NOTE — TELEPHONE ENCOUNTER
Prescription approved per [facility] Refill Protocol.  Gloria Porter RN

## 2018-10-30 ENCOUNTER — OFFICE VISIT (OUTPATIENT)
Dept: OPHTHALMOLOGY | Facility: CLINIC | Age: 58
End: 2018-10-30
Payer: COMMERCIAL

## 2018-10-30 DIAGNOSIS — H40.1131 PRIMARY OPEN ANGLE GLAUCOMA OF BOTH EYES, MILD STAGE: Primary | ICD-10-CM

## 2018-10-30 PROCEDURE — 92012 INTRM OPH EXAM EST PATIENT: CPT | Performed by: STUDENT IN AN ORGANIZED HEALTH CARE EDUCATION/TRAINING PROGRAM

## 2018-10-30 ASSESSMENT — TONOMETRY
IOP_METHOD: ICARE
OD_IOP_MMHG: 15
OS_IOP_MMHG: 15

## 2018-10-30 ASSESSMENT — SLIT LAMP EXAM - LIDS
COMMENTS: NORMAL
COMMENTS: NORMAL

## 2018-10-30 ASSESSMENT — EXTERNAL EXAM - LEFT EYE: OS_EXAM: NORMAL

## 2018-10-30 ASSESSMENT — VISUAL ACUITY
OD_PH_CC: 20/30
OD_CC+: -2
CORRECTION_TYPE: GLASSES
METHOD: SNELLEN - LINEAR
OS_PH_CC: 50-1
OS_CC: 20/60
OD_CC: 20/40

## 2018-10-30 ASSESSMENT — CUP TO DISC RATIO
OD_RATIO: 0.65 UD
OS_RATIO: 0.7 UD

## 2018-10-30 ASSESSMENT — EXTERNAL EXAM - RIGHT EYE: OD_EXAM: NORMAL

## 2018-10-30 NOTE — LETTER
10/30/2018         RE: Ren Farrar  [address]  [place] [address]        Dear Colleague,    Thank you for referring your patient, Ren Farrar, to the [facility].     His eye exam is stable.  Please see a copy of my visit note below.     Current Eye Medications:  Latanoprost both eyes every evening, but he did not use the drops last night (he wanted to see what his pressure would be today without the drops).  Last drops:  2 nights ago.       Subjective:  Follow up Open Angle Glaucoma.  Patient is here for a pressure check.  He was unable to tolerate the newest glasses prescription, so he went back to wearing his older glasses.  He feels his distance vision may be slightly blurry with the older glasses, but it's better than with the newest glasses.       Objective:  See Ophthalmology Exam.       Assessment:  Ren Farrar is a 58 year old male who presents with:   Encounter Diagnosis   Name Primary?     Primary open angle glaucoma of both eyes, mild stage; s/p SLT #1 OD 8/2018 Intraocular pressure 15/15 today (no latanoprost last night)     Reviewed previous OCT results in detail with the patient  Offered MR check today, but he prefers to wait until February to recheck refraction    Plan:  Continue Latanoprost (teal top) at bedtime both eyes      Katherine Danielson MD  [phone]        Again, thank you for allowing me to participate in the care of your patient.        Sincerely,        Katherine Danielson MD

## 2018-10-30 NOTE — PATIENT INSTRUCTIONS
Continue Latanoprost (teal top) at bedtime both eyes      Katherine Danielson MD  [phone]

## 2018-10-30 NOTE — MR AVS SNAPSHOT
After Visit Summary   10/30/2018    Ren Farrar    MRN: [id number]           Patient Information     Date Of Birth          1960        Visit Information        Provider Department      10/30/2018 11:00 AM Katherine Danielson MD [facility]        Today's Diagnoses     Primary open angle glaucoma of both eyes, mild stage; s/p SLT #1 OD 8/2018    -  1      Care Instructions    Continue Latanoprost (teal top) at bedtime both eyes      Katherine Danielson MD  [phone]            Follow-ups after your visit        Follow-up notes from your care team     Return in about 6 months (around 4/30/2019) for Complete Exam.      Your next 10 appointments already scheduled     Feb 18, 2019  2:00 PM CST   [facility]    [address] [place] [address] [phone]              Who to contact     If you have questions or need follow up information about today's clinic visit or your schedule please contact [facility] directly at [phone].  Normal or non-critical lab and imaging results will be communicated to you by MyChart, letter or phone within 4 business days after the clinic has received the results. If you do not hear from us within 7 days, please contact the clinic through MyChart or phone. If you have a critical or abnormal lab result, we will notify you by phone as soon as possible.  Submit refill requests through North by South or call your pharmacy and they will forward the refill request to us. Please allow 3 business days for your refill to be completed.          Additional Information About Your Visit        MyChart Information     North by South gives you secure access to your electronic health record. If you see a primary care provider, you can also send messages to your care team and make appointments. If you have questions, please call your primary care clinic.  If you do not have  a primary care provider, please call [phone] and they will assist you.        Care EveryWhere ID     This is your Care EveryWhere ID. This could be used by other organizations to access your [place] medical records  [id number]         Blood Pressure from Last 3 Encounters:   04/27/18 132/78   03/29/18 134/82   10/12/17 126/75    Weight from Last 3 Encounters:   04/27/18 107.6 kg (237 lb 3.2 oz)   03/29/18 109.8 kg (242 lb)   10/12/17 106.6 kg (235 lb)              Today, you had the following     No orders found for display       Primary Care Provider Office Phone # Fax #    Humberto Humphrey [phone]       [address]  [facility] [address]        Equal Access to Services     [facility]: Hadii aad ku hadasho Soomaali, waaxda luqadaha, qaybta kaalmada adeegyada, waxay jayy haymaria g main khmandy berg . So [facility] [phone].    ATENCIÓN: Si habla español, tiene a ortega disposición servicios gratuitos de asistencia lingüística. Buster al [phone].    We comply with applicable federal civil rights laws and [place] laws. We do not discriminate on the basis of race, color, national origin, age, disability, sex, sexual orientation, or gender identity.            Thank you!     Thank you for choosing [facility]  for your care. Our goal is always to provide you with excellent care. Hearing back from our patients is one way we can continue to improve our services. Please take a few minutes to complete the written survey that you may receive in the mail after your visit with us. Thank you!             Your Updated Medication List - Protect others around you: Learn how to safely use, store and throw away your medicines at www.disposemymeds.org.          This list is accurate as of 10/30/18 11:31 AM.  Always use your most recent med list.                   Brand Name Dispense Instructions for use Diagnosis    amLODIPine-benazepril 5-20 MG per capsule    LOTREL    90 capsule     TAKE 1 CAPSULE BY MOUTH DAILY FOR BLOOD PRESSURE.    Essential hypertension with goal blood pressure less than 140/90       aspirin 81 MG tablet      1 TABLET DAILY*        diclofenac 50 MG EC tablet    VOLTAREN    60 tablet    Take 1 tablet (50 mg) by mouth 3 times daily as needed for moderate pain (toe) . Take with food.    Arthritis of first MTP joint       latanoprost 0.005 % ophthalmic solution    XALATAN    1 Bottle    Place 1 drop into both eyes At Bedtime    Open-angle glaucoma of both eyes, unspecified glaucoma stage, unspecified open-angle glaucoma type       metFORMIN 500 MG 24 hr tablet    GLUCOPHAGE-XR    360 tablet    Take 2 tablets (1,000 mg) by mouth 2 times daily (with meals) for diabetes.    Type 2 diabetes mellitus without complication, without long-term current use of insulin (H)       niacin 500 MG CR tablet    NIASPAN    360 tablet    Take 4 tablets (2,000 mg) by mouth At Bedtime for cholesterol. Take with low-fat snack.    Hyperlipidemia LDL goal <100       STATIN NOT PRESCRIBED (INTENTIONAL)      Causes muscle aches

## 2018-10-30 NOTE — PROGRESS NOTES
Current Eye Medications:  Latanoprost both eyes every evening, but he did not use the drops last night (he wanted to see what his pressure would be today without the drops).  Last drops:  2 nights ago.       Subjective:  Follow up Open Angle Glaucoma.  Patient is here for a pressure check.  He was unable to tolerate the newest glasses prescription, so he went back to wearing his older glasses.  He feels his distance vision may be slightly blurry with the older glasses, but it's better than with the newest glasses.       Objective:  See Ophthalmology Exam.       Assessment:  Ren Farrar is a 58 year old male who presents with:   Encounter Diagnosis   Name Primary?     Primary open angle glaucoma of both eyes, mild stage; s/p SLT #1 OD 8/2018 Intraocular pressure 15/15 today (no latanoprost last night)     Reviewed previous OCT results in detail with the patient  Offered MR check today, but he prefers to wait until February to recheck refraction    Plan:  Continue Latanoprost (teal top) at bedtime both eyes      Katherine Danielson MD  [phone]

## 2018-11-08 DIAGNOSIS — E11.9 TYPE 2 DIABETES MELLITUS WITHOUT COMPLICATION, WITHOUT LONG-TERM CURRENT USE OF INSULIN (H): ICD-10-CM

## 2018-11-08 NOTE — TELEPHONE ENCOUNTER
"Requested Prescriptions   Pending Prescriptions Disp Refills     metFORMIN (GLUCOPHAGE-XR) 500 MG 24 hr tablet [Pharmacy Med Name: METFORMIN  MG TABLET]  Last Written Prescription Date:  03/29/18  Last Fill Quantity: 360,  # refills: 1   Last Office Visit with SAMMIE, JAY JAY or [facility] prescribing provider:  03/29/18Lefty   Future Office Visit:    360 tablet 1     Sig: TAKE 2 TABLETS (1,000 MG) BY MOUTH 2 TIMES DAILY (WITH MEALS) FOR DIABETES.    Biguanide Agents Failed    11/8/2018  2:09 AM       Failed - Blood pressure less than 140/90 in past 6 months    BP Readings from Last 3 Encounters:   04/27/18 132/78   03/29/18 134/82   10/12/17 126/75                Failed - Patient has had a Microalbumin in the past 15 mos.    Recent Labs   Lab Test  02/28/17   0933   MICROL  9   UMALCR  4.64            Failed - Patient has documented A1c within the specified period of time.    If HgbA1C is 8 or greater, it needs to be on file within the past 3 months.  If less than 8, must be on file within the past 6 months.     Recent Labs   Lab Test  03/29/18   1249   A1C  8.3*            Failed - Recent (6 mo) or future (30 days) visit within the authorizing provider's specialty    Patient had office visit in the last 6 months or has a visit in the next 30 days with authorizing provider or within the authorizing provider's specialty.  See \"Patient Info\" tab in inbasket, or \"Choose Columns\" in Meds & Orders section of the refill encounter.           Passed - Patient has documented LDL within the past 12 mos.    Recent Labs   Lab Test  03/29/18   1249   LDL  83            Passed - Patient is age 10 or older       Passed - Patient's CR is NOT>1.4 OR Patient's EGFR is NOT<45 within past 12 mos.    Recent Labs   Lab Test  03/29/18   1249   GFRESTIMATED  >90   GFRESTBLACK  >90       Recent Labs   Lab Test  03/29/18   1249   CR  0.84            Passed - Patient does NOT have a diagnosis of CHF.          "

## 2018-11-08 NOTE — LETTER
November 14, 2018      Ren Farrar  [address]  [place] [place] [address]        Dear Ren Farrar,      The refill request made by your pharmacy was received at the clinic.     Signed Prescriptions:                        Disp   Refills    metFORMIN (GLUCOPHAGE-XR) 500 MG 24 hr tab*120 ta*0        Sig: Take 2 tablets (1,000 mg) by mouth 2 times daily           (with meals) for diabetes.  Authorizing Provider: BERLIN DICKSON      At this time you are due in the clinic for a follow up appointment. A one time jareth refill has been sent to your pharmacy.       Please call your clinic to make an appointment with your provider before you run out of medication. This will prevent a delay in your next month's refill.    [facility] [phone]  [facility]- [phone]  [facility] [phone]    For your convenience we also offer online appointment scheduling at [email or web address] or [email or web address].     We invite you to refill your next prescription at a [facility] or take advantage of our prescription mail service.      Sincerely,    [facility] Care Team/lyle

## 2018-11-09 NOTE — TELEPHONE ENCOUNTER
Routing refill request to provider for review/approval because:  Fails protocol: see below.     Venessa Chong RN

## 2018-11-11 RX ORDER — METFORMIN HCL 500 MG
1000 TABLET, EXTENDED RELEASE 24 HR ORAL 2 TIMES DAILY WITH MEALS
Qty: 120 TABLET | Refills: 0 | Status: SHIPPED | OUTPATIENT
Start: 2018-11-11 | End: 2018-11-29

## 2018-11-14 NOTE — TELEPHONE ENCOUNTER
This writer attempted to contact patient on 11/14/18      Reason for call need appointment and left detailed message.      If patient calls back:   Schedule Office Visit appointment within 2 business days with PCP, document that pt called and close encounter.    Letter sent.    Trish Liz MA

## 2018-11-29 ENCOUNTER — OFFICE VISIT (OUTPATIENT)
Dept: FAMILY MEDICINE | Facility: CLINIC | Age: 58
End: 2018-11-29
Payer: COMMERCIAL

## 2018-11-29 ENCOUNTER — TELEPHONE (OUTPATIENT)
Dept: FAMILY MEDICINE | Facility: CLINIC | Age: 58
End: 2018-11-29

## 2018-11-29 VITALS
HEART RATE: 84 BPM | OXYGEN SATURATION: 97 % | TEMPERATURE: 98.5 F | HEIGHT: 72 IN | DIASTOLIC BLOOD PRESSURE: 78 MMHG | WEIGHT: 246 LBS | SYSTOLIC BLOOD PRESSURE: 132 MMHG | BODY MASS INDEX: 33.32 KG/M2

## 2018-11-29 DIAGNOSIS — Z11.4 SCREENING FOR HUMAN IMMUNODEFICIENCY VIRUS: ICD-10-CM

## 2018-11-29 DIAGNOSIS — Z23 NEED FOR SHINGLES VACCINE: ICD-10-CM

## 2018-11-29 DIAGNOSIS — E11.65 UNCONTROLLED TYPE 2 DIABETES MELLITUS WITH HYPERGLYCEMIA (H): ICD-10-CM

## 2018-11-29 DIAGNOSIS — E78.5 HYPERLIPIDEMIA LDL GOAL <100: ICD-10-CM

## 2018-11-29 DIAGNOSIS — E11.65 UNCONTROLLED TYPE 2 DIABETES MELLITUS WITH HYPERGLYCEMIA (H): Primary | ICD-10-CM

## 2018-11-29 DIAGNOSIS — Z23 NEED FOR PROPHYLACTIC VACCINATION AND INOCULATION AGAINST INFLUENZA: ICD-10-CM

## 2018-11-29 DIAGNOSIS — R79.89 DECREASED THYROID STIMULATING HORMONE (TSH) LEVEL: ICD-10-CM

## 2018-11-29 DIAGNOSIS — I10 ESSENTIAL HYPERTENSION WITH GOAL BLOOD PRESSURE LESS THAN 140/90: ICD-10-CM

## 2018-11-29 LAB
ALT SERPL W P-5'-P-CCNC: 20 U/L (ref 0–70)
CHOLEST SERPL-MCNC: 166 MG/DL
CREAT SERPL-MCNC: 0.89 MG/DL (ref 0.66–1.25)
CREAT UR-MCNC: 247 MG/DL
GFR SERPL CREATININE-BSD FRML MDRD: 87 ML/MIN/1.7M2
HBA1C MFR BLD: 8.6 % (ref 0–5.6)
HDLC SERPL-MCNC: 64 MG/DL
HIV 1+2 AB+HIV1 P24 AG SERPL QL IA: NONREACTIVE
LDLC SERPL CALC-MCNC: 89 MG/DL
MICROALBUMIN UR-MCNC: 10 MG/L
MICROALBUMIN/CREAT UR: 3.97 MG/G CR (ref 0–17)
NONHDLC SERPL-MCNC: 102 MG/DL
POTASSIUM SERPL-SCNC: 4 MMOL/L (ref 3.4–5.3)
TRIGL SERPL-MCNC: 65 MG/DL
TSH SERPL DL<=0.005 MIU/L-ACNC: 0.72 MU/L (ref 0.4–4)

## 2018-11-29 PROCEDURE — 84460 ALANINE AMINO (ALT) (SGPT): CPT | Performed by: FAMILY MEDICINE

## 2018-11-29 PROCEDURE — 90682 RIV4 VACC RECOMBINANT DNA IM: CPT | Performed by: FAMILY MEDICINE

## 2018-11-29 PROCEDURE — 84443 ASSAY THYROID STIM HORMONE: CPT | Performed by: FAMILY MEDICINE

## 2018-11-29 PROCEDURE — 83036 HEMOGLOBIN GLYCOSYLATED A1C: CPT | Performed by: FAMILY MEDICINE

## 2018-11-29 PROCEDURE — 87389 HIV-1 AG W/HIV-1&-2 AB AG IA: CPT | Performed by: FAMILY MEDICINE

## 2018-11-29 PROCEDURE — 80061 LIPID PANEL: CPT | Performed by: FAMILY MEDICINE

## 2018-11-29 PROCEDURE — 90472 IMMUNIZATION ADMIN EACH ADD: CPT | Performed by: FAMILY MEDICINE

## 2018-11-29 PROCEDURE — 99214 OFFICE O/P EST MOD 30 MIN: CPT | Mod: 25 | Performed by: FAMILY MEDICINE

## 2018-11-29 PROCEDURE — 36415 COLL VENOUS BLD VENIPUNCTURE: CPT | Performed by: FAMILY MEDICINE

## 2018-11-29 PROCEDURE — 90750 HZV VACC RECOMBINANT IM: CPT | Performed by: FAMILY MEDICINE

## 2018-11-29 PROCEDURE — 82043 UR ALBUMIN QUANTITATIVE: CPT | Performed by: FAMILY MEDICINE

## 2018-11-29 PROCEDURE — 84132 ASSAY OF SERUM POTASSIUM: CPT | Performed by: FAMILY MEDICINE

## 2018-11-29 PROCEDURE — 90471 IMMUNIZATION ADMIN: CPT | Performed by: FAMILY MEDICINE

## 2018-11-29 PROCEDURE — 82565 ASSAY OF CREATININE: CPT | Performed by: FAMILY MEDICINE

## 2018-11-29 RX ORDER — METFORMIN HCL 500 MG
1000 TABLET, EXTENDED RELEASE 24 HR ORAL 2 TIMES DAILY WITH MEALS
Qty: 360 TABLET | Refills: 0 | Status: SHIPPED | OUTPATIENT
Start: 2018-11-29 | End: 2019-03-15

## 2018-11-29 RX ORDER — NIACIN 500 MG/1
2000 TABLET, EXTENDED RELEASE ORAL AT BEDTIME
Qty: 360 TABLET | Refills: 1 | Status: SHIPPED | OUTPATIENT
Start: 2018-11-29 | End: 2019-03-15

## 2018-11-29 RX ORDER — AMLODIPINE AND BENAZEPRIL HYDROCHLORIDE 5; 20 MG/1; MG/1
1 CAPSULE ORAL DAILY
Qty: 90 CAPSULE | Refills: 1 | Status: SHIPPED | OUTPATIENT
Start: 2018-11-29 | End: 2019-03-15

## 2018-11-29 ASSESSMENT — PAIN SCALES - GENERAL: PAINLEVEL: NO PAIN (0)

## 2018-11-29 NOTE — PATIENT INSTRUCTIONS
At [facility], we strive to deliver an exceptional experience to you, every time we see you.  If you receive a survey in the mail, please send us back your thoughts. We really do value your feedback.    Your care team:                            Family Medicine Internal Medicine   MD Mednoza Gates MD Shantel Branch-Fleming, MD Katya Georgiev PA-C Megan Hill, APRN MYRON Brown MD Pediatrics   Zan Velazquez, RAMONITA Slaughter, MD Kanwal Garcia APRN CNP   MD Lori Hawkins MD Deborah Mielke, MD Mikala Major, APRN [facility]      Clinic hours: Monday - Thursday 7 am-7 pm; Fridays 7 am-5 pm.   Urgent care: Monday - Friday 11 am-9 pm; Saturday and Sunday 9 am-5 pm.  Pharmacy : Monday -Thursday 8 am-8 pm; Friday 8 am-6 pm; Saturday and Sunday 9 am-5 pm.     Clinic: [phone]   Pharmacy: [phone]

## 2018-11-29 NOTE — MR AVS SNAPSHOT
After Visit Summary   11/29/2018    Ren Farrar    MRN: [id number]           Patient Information     Date Of Birth          1960        Visit Information        Provider Department      11/29/2018 9:20 AM Humberto Humphrey MD [facility]        Today's Diagnoses     Uncontrolled type 2 diabetes mellitus with hyperglycemia (H)    -  1    Decreased thyroid stimulating hormone (TSH) level        Essential hypertension with goal blood pressure less than 140/90        Hyperlipidemia LDL goal <100        Screening for human immunodeficiency virus        Need for prophylactic vaccination and inoculation against influenza        Need for shingles vaccine          Care Instructions    At [facility], we strive to deliver an exceptional experience to you, every time we see you.  If you receive a survey in the mail, please send us back your thoughts. We really do value your feedback.    Your care team:                            Family Medicine Internal Medicine   MD Mendoza Gates MD Shantel Branch-Fleming, MD Katya Georgiev PA-C Megan Hill, APRN CNP    Mitesh Brown MD Pediatrics   Zan Velazquez, RAMONITA Slaughter, CNP MD Kanwal Powell APRN CNP   MD Lori Hawkins MD Deborah Mielke, MD Kim Thein, APRN [facility]      Clinic hours: Monday - Thursday 7 am-7 pm; Fridays 7 am-5 pm.   Urgent care: Monday - Friday 11 am-9 pm; Saturday and Sunday 9 am-5 pm.  Pharmacy : Monday -Thursday 8 am-8 pm; Friday 8 am-6 pm; Saturday and Sunday 9 am-5 pm.     Clinic: [phone]   Pharmacy: [phone]                Follow-ups after your visit        Follow-up notes from your care team     Return in about 10 weeks (around 2/7/2019) for diabetes.      Your next 10 appointments already scheduled     Feb 18, 2019  2:00 PM CST   New Visit with Katherine Danielson MD   [facility] [address] [address]  [place] [address] [phone]              Who to contact     If you have questions or need follow up information about today's clinic visit or your schedule please contact [facility] KALA PRICE directly at [phone].  Normal or non-critical lab and imaging results will be communicated to you by MyChart, letter or phone within 4 business days after the clinic has received the results. If you do not hear from us within 7 days, please contact the clinic through [employer] or phone. If you have a critical or abnormal lab result, we will notify you by phone as soon as possible.  Submit refill requests through [employer] or call your pharmacy and they will forward the refill request to us. Please allow 3 business days for your refill to be completed.          Additional Information About Your Visit        MyChart Information     [employer] gives you secure access to your electronic health record. If you see a primary care provider, you can also send messages to your care team and make appointments. If you have questions, please call your primary care clinic.  If you do not have a primary care provider, please call [phone] and they will assist you.        Care EveryWhere ID     This is your Care EveryWhere ID. This could be used by other organizations to access your [place] medical records  [id number]        Your Vitals Were     Pulse Temperature Height Pulse Oximetry BMI (Body Mass Index)       84 98.5  F (36.9  C) (Oral) 5' 11.89\" (1.826 m) 97% 33.47 kg/m2        Blood Pressure from Last 3 Encounters:   11/29/18 132/78   04/27/18 132/78   03/29/18 134/82    Weight from Last 3 Encounters:   11/29/18 246 lb (111.6 kg)   04/27/18 237 lb 3.2 oz (107.6 kg)   03/29/18 242 lb (109.8 kg)              We Performed the Following     Albumin Random Urine Quantitative with Creat Ratio     ALT     C RIV4 (FLUBLOK) VACCINE RECOMBINANT DNA PRSRV ANTIBIO FREE, IM     Creatinine     HC ZOSTER VACCINE " RECOMBINANT ADJUVANTED IM NJX     Hemoglobin A1c     HIV Antigen Antibody Combo     Lipid panel reflex to direct LDL Non-fasting     Potassium     TSH with free T4 reflex          Today's Medication Changes          These changes are accurate as of 11/29/18 10:55 AM.  If you have any questions, ask your nurse or doctor.               Start taking these medicines.        Dose/Directions    linagliptin 5 MG Tabs tablet   Commonly known as:  TRADJENTA   Used for:  Uncontrolled type 2 diabetes mellitus with hyperglycemia (H)   Started by:  Humberto Humphrey MD        Dose:  5 mg   Take 1 tablet (5 mg) by mouth daily for diabetes.   Quantity:  90 tablet   Refills:  0            Where to get your medicines      These medications were sent to [facility]/pharmacy #[id number] - [facility], [place] - [address]  [address], [place] [facility] [address]     Phone:  [phone]     amLODIPine-benazepril 5-20 MG per capsule    linagliptin 5 MG Tabs tablet    metFORMIN 500 MG 24 hr tablet    niacin 500 MG CR tablet                Primary Care Provider Office Phone # Fax #    Humberto Humphrey [phone]       [address]  [place] [address]        Equal Access to Services     ROBIN [facility]: Hadii aad ku hadasho Soomaali, waaxda luqadaha, qaybta kaalmada adeegyada, waxay idiin hayaan adeeg kharash la'aan ah. So [facility] [phone].    ATENCIÓN: Si habla español, tiene a ortega disposición servicios gratuitos de asistencia lingüística. Llame al [phone].    We comply with applicable federal civil rights laws and [place] laws. We do not discriminate on the basis of race, color, national origin, age, disability, sex, sexual orientation, or gender identity.            Thank you!     Thank you for choosing [facility]  for your care. Our goal is always to provide you with excellent care. Hearing back from our patients is one way we can continue to improve our services. Please take a few minutes  to complete the written survey that you may receive in the mail after your visit with us. Thank you!             Your Updated Medication List - Protect others around you: Learn how to safely use, store and throw away your medicines at www.disposemymeds.org.          This list is accurate as of 11/29/18 10:55 AM.  Always use your most recent med list.                   Brand Name Dispense Instructions for use Diagnosis    amLODIPine-benazepril 5-20 MG per capsule    LOTREL    90 capsule    Take 1 capsule by mouth daily for blood pressure.    Essential hypertension with goal blood pressure less than 140/90       aspirin 81 MG tablet    ASA     1 TABLET DAILY*        diclofenac 50 MG EC tablet    VOLTAREN    60 tablet    Take 1 tablet (50 mg) by mouth 3 times daily as needed for moderate pain (toe) . Take with food.    Arthritis of first MTP joint       latanoprost 0.005 % ophthalmic solution    XALATAN    1 Bottle    Place 1 drop into both eyes At Bedtime    Open-angle glaucoma of both eyes, unspecified glaucoma stage, unspecified open-angle glaucoma type       linagliptin 5 MG Tabs tablet    TRADJENTA    90 tablet    Take 1 tablet (5 mg) by mouth daily for diabetes.    Uncontrolled type 2 diabetes mellitus with hyperglycemia (H)       metFORMIN 500 MG 24 hr tablet    GLUCOPHAGE-XR    360 tablet    Take 2 tablets (1,000 mg) by mouth 2 times daily (with meals) for diabetes.    Uncontrolled type 2 diabetes mellitus with hyperglycemia (H)       niacin 500 MG CR tablet    NIASPAN    360 tablet    Take 4 tablets (2,000 mg) by mouth At Bedtime for cholesterol. Take with low-fat snack.    Hyperlipidemia LDL goal <100       STATIN NOT PRESCRIBED    INTENTIONAL     Causes muscle aches

## 2018-11-29 NOTE — PROGRESS NOTES

## 2018-11-29 NOTE — TELEPHONE ENCOUNTER
Plan does not cover linagliptin (TRADJENTA) 5 MG TABS tablet.  Please call [phone] to initiate Prior Auth or change med.    Insurance type and ID number: ID# [id number]      Additional Information:     Michelle Colon

## 2018-11-29 NOTE — PROGRESS NOTES
"  SUBJECTIVE:   Ren Farrar is a 58 year old male who presents to clinic today for the following health issues:    Diabetes Follow-up    Patient is checking blood sugars: not at all, last check ~ 3 weeks ago, was around 149    Diabetic concerns: None     Symptoms of hypoglycemia (low blood sugar): none     Paresthesias (numbness or burning in feet) or sores: No     Date of last diabetic eye exam: 2/15/18  Diabetes Management Resources    Hyperlipidemia Follow-Up    Rate your low fat/cholesterol diet?: fair    Taking statin?  No    Other lipid medications/supplements?:  Niacin, without side effects    Hypertension Follow-up    Outpatient blood pressures are being checked at home.  Results are good.    Low Salt Diet: low salt    BP Readings from Last 2 Encounters:   11/29/18 132/78   04/27/18 132/78     Hemoglobin A1C (%)   Date Value   11/29/2018 8.6 (H)   03/29/2018 8.3 (H)     LDL Cholesterol Calculated (mg/dL)   Date Value   03/29/2018 83   10/12/2017 83       Amount of exercise or physical activity: None    Problems taking medications regularly: No    Medication side effects: none    Diet: low salt and low fat/cholesterol      Past medical, family, and social histories, medications, and allergies are reviewed and updated in Epic.       ROS:  Constitutional, HEENT, cardiovascular, pulmonary, GI, , musculoskeletal, neuro, skin, endocrine and psych systems are negative, except as otherwise noted.    OBJECTIVE:     /78 (BP Location: Left arm, Patient Position: Chair, Cuff Size: Adult Large)  Pulse 84  Temp 98.5  F (36.9  C) (Oral)  Ht 5' 11.89\" (1.826 m)  Wt 246 lb (111.6 kg)  SpO2 97%  BMI 33.47 kg/m2  Body mass index is 33.47 kg/(m^2).     GENERAL: healthy, alert and no distress  EYES: Eyes grossly normal to inspection, PERRL, EOMI, sclerae white and conjunctivae normal  RESP: lungs clear to auscultation - no crackles or wheezes, no areas of dullness, no tachypnea  CV: Heart regular rate and rhythm " without murmur, click or rub. No peripheral edema and peripheral pulses strong  MS: no gross musculoskeletal defects noted, no edema  SKIN: no suspicious lesions or rashes  NEURO: Normal strength and tone, sensory exam grossly normal, mentation intact, oriented times 3 and cranial nerves 2-12 intact  PSYCH: mentation appears normal, affect normal/bright      Diagnostic Test Results:  Results for orders placed or performed in visit on 11/29/18 (from the past 24 hour(s))   Hemoglobin A1c   Result Value Ref Range    Hemoglobin A1C 8.6 (H) 0 - 5.6 %     Lab Results   Component Value Date    A1C 8.6 11/29/2018    A1C 8.3 03/29/2018    A1C 7.1 10/12/2017    A1C 7.8 02/28/2017    A1C 7.5 04/07/2016       ASSESSMENT/PLAN:   ASSESSMENT/PLAN:  (E11.65) Uncontrolled type 2 diabetes mellitus with hyperglycemia (H)  (primary encounter diagnosis)  Comment: Patient failed to increase his exercise and make the dietary changes we discussed at his March visit.   Plan: Hemoglobin A1c, Albumin Random Urine         Quantitative with Creat Ratio, TSH with free T4        reflex, metFORMIN (GLUCOPHAGE-XR) 500 MG 24 hr         tablet, linagliptin (TRADJENTA) 5 MG TABS         tablet        Start exercising. Weight loss encouraged. F/u 2.5 months.    (R79.89) Decreased thyroid stimulating hormone (TSH) level  Comment: mild decrease in TSH 1 year ago, but fT4 was normal  Plan: TSH with free T4 reflex        Refer to Endo if still off    (I10) Essential hypertension with goal blood pressure less than 140/90  Comment: well controlled  Plan: Potassium, Creatinine, amLODIPine-benazepril         (LOTREL) 5-20 MG per capsule            (E78.5) Hyperlipidemia LDL goal <100  Comment: historically at goal, fasting  Plan: Lipid panel reflex to direct LDL Non-fasting,         ALT, niacin (NIASPAN) 500 MG CR tablet        Recheck in 6 months    (Z11.4) Screening for human immunodeficiency virus  Comment: indications for screening discussed with the  patient  Plan: HIV Antigen Antibody Combo            (Z23) Need for prophylactic vaccination and inoculation against influenza  Comment: Influenza vaccine offered and accepted by patient. He has received it before without problems.   Plan: C RIV4 (FLUBLOK) VACCINE RECOMBINANT DNA PRSRV         ANTIBIO FREE, IM            (Z23) Need for shingles vaccine  Comment: side effect profile discussed w/ pt, 1/2  Plan: HC ZOSTER VACCINE RECOMBINANT ADJUVANTED IM NJX        #2 in 2-6 mo, such as at next diabetes check    Humberto Humphrey MD

## 2018-11-29 NOTE — NURSING NOTE
1.  Has the patient received the information for the Zostavax vaccine? YES    2.  Does the patient have any of the following contraindications?     Allergy to Neomycin or Gelatin? No       Current moderate or severe illness? No  Temp = 98.5  If temp > 99.0 degrees orally or patient has above allergies, vaccine is deferred    3.  The vaccine has been administered in the usual fashion and the patient was instructed to wait 15 minutes before leaving the building in the event of an allergic reaction: YES    Vaccination given by GENOVEVA Grijalva MA    Recorded by George Grijalva

## 2018-12-06 RX ORDER — SAXAGLIPTIN 5 MG/1
5 TABLET, FILM COATED ORAL DAILY
Qty: 90 TABLET | Refills: 0 | Status: SHIPPED | OUTPATIENT
Start: 2018-12-06 | End: 2018-12-19

## 2018-12-06 RX ORDER — ALOGLIPTIN 25 MG/1
25 TABLET, FILM COATED ORAL DAILY
Qty: 90 TABLET | Refills: 0 | Status: SHIPPED | OUTPATIENT
Start: 2018-12-06 | End: 2018-12-19

## 2018-12-07 NOTE — TELEPHONE ENCOUNTER
Patient was notified regarding MSG below.  Patient restated the msg, denied having any questions and stated clarification at the end of the conversation.  Teresa Oden

## 2018-12-22 DIAGNOSIS — E11.9 TYPE 2 DIABETES MELLITUS WITHOUT COMPLICATION, WITHOUT LONG-TERM CURRENT USE OF INSULIN (H): ICD-10-CM

## 2018-12-22 NOTE — TELEPHONE ENCOUNTER
"Requested Prescriptions   Pending Prescriptions Disp Refills     metFORMIN (GLUCOPHAGE-XR) 500 MG 24 hr tablet [Pharmacy Med Name: METFORMIN  MG TABLET] 360 tablet 1        Last Written Prescription Date:  11/29/18  Last Fill Quantity: 360,  # refills: 0   Last Office Visit with G, GILBERTO or [facility] prescribing provider:  11/29/18   Future Office Visit:      Sig: TAKE 2 TABLETS (1,000 MG) BY MOUTH 2 TIMES DAILY (WITH MEALS) FOR DIABETES.    Biguanide Agents Passed - 12/22/2018  1:20 PM       Passed - Blood pressure less than 140/90 in past 6 months    BP Readings from Last 3 Encounters:   11/29/18 132/78   04/27/18 132/78   03/29/18 134/82                Passed - Patient has documented LDL within the past 12 mos.    Recent Labs   Lab Test 11/29/18  0951   LDL 89            Passed - Patient has had a Microalbumin in the past 15 mos.    Recent Labs   Lab Test 11/29/18  1001   MICROL 10   UMALCR 3.97            Passed - Patient is age 10 or older       Passed - Patient has documented A1c within the specified period of time.    If HgbA1C is 8 or greater, it needs to be on file within the past 3 months.  If less than 8, must be on file within the past 6 months.     Recent Labs   Lab Test 11/29/18  0951   A1C 8.6*            Passed - Patient's CR is NOT>1.4 OR Patient's EGFR is NOT<45 within past 12 mos.    Recent Labs   Lab Test 11/29/18  0951   GFRESTIMATED 87   GFRESTBLACK >90       Recent Labs   Lab Test 11/29/18  0951   CR 0.89            Passed - Patient does NOT have a diagnosis of CHF.       Passed - Recent (6 mo) or future (30 days) visit within the authorizing provider's specialty    Patient had office visit in the last 6 months or has a visit in the next 30 days with authorizing provider or within the authorizing provider's specialty.  See \"Patient Info\" tab in inbasket, or \"Choose Columns\" in Meds & Orders section of the refill encounter.                  Caio Faarax  [facility]  "

## 2018-12-27 RX ORDER — METFORMIN HCL 500 MG
1000 TABLET, EXTENDED RELEASE 24 HR ORAL 2 TIMES DAILY WITH MEALS
Qty: 360 TABLET | Refills: 1 | Status: SHIPPED | OUTPATIENT
Start: 2018-12-27 | End: 2019-03-15

## 2018-12-27 NOTE — TELEPHONE ENCOUNTER
Prescription approved per [facility] Refill Protocol.      Dagoberto Huff RN, BSN

## 2018-12-30 ENCOUNTER — OFFICE VISIT (OUTPATIENT)
Dept: URGENT CARE | Facility: URGENT CARE | Age: 58
End: 2018-12-30
Payer: COMMERCIAL

## 2018-12-30 VITALS
SYSTOLIC BLOOD PRESSURE: 144 MMHG | DIASTOLIC BLOOD PRESSURE: 77 MMHG | HEART RATE: 100 BPM | BODY MASS INDEX: 32.32 KG/M2 | TEMPERATURE: 98.7 F | OXYGEN SATURATION: 96 % | WEIGHT: 237.6 LBS

## 2018-12-30 DIAGNOSIS — H60.312 ACUTE DIFFUSE OTITIS EXTERNA OF LEFT EAR: Primary | ICD-10-CM

## 2018-12-30 PROCEDURE — 99213 OFFICE O/P EST LOW 20 MIN: CPT | Performed by: FAMILY MEDICINE

## 2018-12-30 RX ORDER — CIPROFLOXACIN AND DEXAMETHASONE 3; 1 MG/ML; MG/ML
4 SUSPENSION/ DROPS AURICULAR (OTIC) 2 TIMES DAILY
Qty: 2.8 ML | Refills: 0 | Status: SHIPPED | OUTPATIENT
Start: 2018-12-30 | End: 2019-03-15

## 2018-12-30 NOTE — PROGRESS NOTES
SUBJECTIVE:   Ren Farrar is a 58 year old male who presents to clinic today for the following health issues:    Chief Complaint   Patient presents with     Otalgia     Patient complains of pain in left ear        Duration: 2 days     Description (location/character/radiation): left ear pain    Intensity:  moderate    Accompanying signs and symptoms: none     History (similar episodes/previous evaluation): yes    Precipitating or alleviating factors: None    Therapies tried and outcome: OTC analgesia          Problem list and histories reviewed & adjusted, as indicated.  Additional history: as documented    Patient Active Problem List   Diagnosis     DARIA (obstructive sleep apnea)     Uncontrolled type 2 diabetes mellitus (H)     Hyperlipidemia LDL goal <100     Gastroesophageal reflux disease without esophagitis     Erectile dysfunction     Essential hypertension with goal blood pressure less than 140/90     Pain in joint, shoulder region     Obesity, Class I, BMI 30-34.9     Osteoarthritis of left hip     Nuclear sclerotic cataract of both eyes     Advanced directives, counseling/discussion     Adhesive capsulitis     Shoulder impingement     OAG (open angle glaucoma)     Anemia     Past Surgical History:   Procedure Laterality Date     NO HISTORY OF SURGERY       SELECTIVE LASER TRABECULOPLASTY (SLT) OD (RIGHT EYE) Right 2018    SLT #1 OD (inf 180)       Social History     Tobacco Use     Smoking status: Never Smoker     Smokeless tobacco: Never Used   Substance Use Topics     Alcohol use: Yes     Comment: occassioanally     Family History   Problem Relation Age of Onset     C.A.D. Mother      Myocardial Infarction Mother      Hypertension Mother      Diabetes Mother         borderline     Eye Disorder Mother         cataract     Kidney Disease Mother         dialysis     Diabetes Father      Hypertension Father      Heart Failure Father          CHF 81     Diabetes Sister      Thyroid Disease Sister       Depression Son 17     Asthma No family hx of      Cerebrovascular Disease No family hx of      Breast Cancer No family hx of      Cancer - colorectal No family hx of      Prostate Cancer No family hx of      Allergies No family hx of      Cancer No family hx of      Glaucoma No family hx of      Macular Degeneration No family hx of          Current Outpatient Medications   Medication Sig Dispense Refill     alogliptin (NESINA) 25 MG tablet Take 1 tablet (25 mg) by mouth daily for diabetes. 90 tablet 0     amLODIPine-benazepril (LOTREL) 5-20 MG per capsule Take 1 capsule by mouth daily for blood pressure. 90 capsule 1     ASPIRIN 81 MG PO TABS 1 TABLET DAILY*  prn     diclofenac (VOLTAREN) 50 MG EC tablet Take 1 tablet (50 mg) by mouth 3 times daily as needed for moderate pain (toe) . Take with food. 60 tablet 1     latanoprost (XALATAN) 0.005 % ophthalmic solution Place 1 drop into both eyes At Bedtime 1 Bottle 12     linagliptin (TRADJENTA) 5 MG TABS tablet Take 1 tablet (5 mg) by mouth daily for diabetes. 90 tablet 0     metFORMIN (GLUCOPHAGE-XR) 500 MG 24 hr tablet TAKE 2 TABLETS (1,000 MG) BY MOUTH 2 TIMES DAILY (WITH MEALS) FOR DIABETES. 360 tablet 1     metFORMIN (GLUCOPHAGE-XR) 500 MG 24 hr tablet Take 2 tablets (1,000 mg) by mouth 2 times daily (with meals) for diabetes. 360 tablet 0     niacin (NIASPAN) 500 MG CR tablet Take 4 tablets (2,000 mg) by mouth At Bedtime for cholesterol. Take with low-fat snack. 360 tablet 1     saxagliptin (ONGLYZA) 5 MG TABS tablet Take 1 tablet (5 mg) by mouth daily for diabetes. 90 tablet 0     sitagliptin (JANUVIA) 100 MG tablet Take 1 tablet (100 mg) by mouth daily for diabetes. 90 tablet 0     STATIN NOT PRESCRIBED, INTENTIONAL, Causes muscle aches       ORDER FOR DME Glucose meter (model per insurance preference) with associated test solution, lancets (#100 w/ prn refills), and test strips (#100 w/ prn refills). Check 100 each prn     Allergies   Allergen Reactions      Atorvastatin Calcium      Myalgias, increased CPK     Simvastatin      myalgias     Recent Labs   Lab Test 11/29/18  0951 03/29/18  1249 10/12/17  1222   A1C 8.6* 8.3* 7.1*   LDL 89 83 83   HDL 64 70 67   TRIG 65 63 46   ALT 20 25 24   CR 0.89 0.84 0.95   GFRESTIMATED 87 >90 81   GFRESTBLACK >90 >90 >90   POTASSIUM 4.0 3.9 4.0   TSH 0.72  --  0.27*      BP Readings from Last 3 Encounters:   12/30/18 144/77   11/29/18 132/78   04/27/18 132/78    Wt Readings from Last 3 Encounters:   12/30/18 107.8 kg (237 lb 9.6 oz)   11/29/18 111.6 kg (246 lb)   04/27/18 107.6 kg (237 lb 3.2 oz)                  Labs reviewed in EPIC    Reviewed and updated as needed this visit by clinical staff  Tobacco  Allergies  Meds       Reviewed and updated as needed this visit by Provider         ROS:  Constitutional, HEENT, cardiovascular, pulmonary, gi and gu systems are negative, except as otherwise noted.    OBJECTIVE:     /77 (BP Location: Left arm, Patient Position: Chair, Cuff Size: Adult Regular)   Pulse 100   Temp 98.7  F (37.1  C) (Oral)   Wt 107.8 kg (237 lb 9.6 oz)   SpO2 96%   BMI 32.32 kg/m    Body mass index is 32.32 kg/m .  GENERAL: alert and in some distress  EYES: Eyes grossly normal to inspection, PERRL and conjunctivae and sclerae normal  HENT: normal cephalic/atraumatic, right ear: normal: no effusions, no erythema, normal landmarks, left ear: red and boggy canal and tender tragus pull, oral mucous membranes moist  NECK: mild tender post auricle lymphadenopathy   RESP: lungs clear to auscultation - no rales, rhonchi or wheezes  CV: regular rates and rhythm, normal S1 S2, no S3 or S4 and no murmur, click or rub  MS: no gross musculoskeletal defects noted, no edema      ASSESSMENT/PLAN:         ICD-10-CM    1. Acute diffuse otitis externa of left ear H60.312 ciprofloxacin-dexamethasone (CIPRODEX) 0.3-0.1 % otic suspension     amoxicillin-clavulanate (AUGMENTIN) 875-125 MG tablet     OTOLARYNGOLOGY  REFERRAL     Symptoms are likely secondary to severe left-sided acute otitis externa.  Ciprodex and Augmentin prescribed, common side effects discussed.  Suggested to follow-up if symptoms persist or worsen, ENT referral placed as well.  Last dental visit was more than a year ago, recommended dental visit as well.  Suggested to continue well hydration, over-the-counter analgesia and written information provided.  Patient understood and in agreement with the above plan.  All questions answered.      Patient Instructions     Patient Education     External Ear Infection (Adult)    External otitis (also called  swimmer s ear ) is an infection in the ear canal. It is often caused by bacteria or fungus. It can occur a few days after water gets trapped in the ear canal (from swimming or bathing). It can also occur after cleaning too deeply in the ear canal with a cotton swab or other object. Sometimes, hair care products get into the ear canal and cause this problem.  Symptoms can include pain, fever, itching, redness, drainage, or swelling of the ear canal. Temporary hearing loss may also occur.  Home care    Do not try to clean the ear canal. This can push pus and bacteria deeper into the canal.    Use prescribed ear drops as directed. These help reduce swelling and fight the infection. If an ear wick was placed in the ear canal, apply drops right onto the end of the wick. The wick will draw the medicine into the ear canal even if it is swollen closed.    A cotton ball may be loosely placed in the outer ear to absorb any drainage.    You may use acetaminophen or ibuprofen to control pain, unless another medicine was prescribed. Note: If you have chronic liver or kidney disease or ever had a stomach ulcer or GI bleeding, talk to your healthcare provider before taking any of these medicines.    Do not allow water to get into your ear when bathing. Also, don't swim until the infection has cleared.  Prevention    Keep your  ears dry. This helps lower the risk of infection. Dry your ears with a towel or hair dryer after getting wet. Also, use ear plugs when swimming.    Do not stick any objects in the ear to remove wax.    If you feel water trapped in your ear, use ear drops right away. You can get these drops over the counter at most drugstores. They work by removing water from the ear canal.  Follow-up care  Follow up with your healthcare provider in 1 week, or as advised.  When to seek medical advice  Call your healthcare provider right away if any of these occur:    Ear pain becomes worse or doesn t improve after 3 days of treatment    Redness or swelling of the outer ear occurs or gets worse    Headache    Painful or stiff neck    Drowsiness or confusion    Fever of 100.4 F (38 C) or higher, or as directed by your healthcare provider    Seizure  Date Last Reviewed: 10/1/2017    5575-0654 [employer]. [address] [place] [address]. All rights reserved. This information is not intended as a substitute for professional medical care. Always follow your healthcare professional's instructions.               Isael Bridges MD  [facility]

## 2018-12-30 NOTE — PATIENT INSTRUCTIONS
Patient Education     External Ear Infection (Adult)    External otitis (also called  swimmer s ear ) is an infection in the ear canal. It is often caused by bacteria or fungus. It can occur a few days after water gets trapped in the ear canal (from swimming or bathing). It can also occur after cleaning too deeply in the ear canal with a cotton swab or other object. Sometimes, hair care products get into the ear canal and cause this problem.  Symptoms can include pain, fever, itching, redness, drainage, or swelling of the ear canal. Temporary hearing loss may also occur.  Home care    Do not try to clean the ear canal. This can push pus and bacteria deeper into the canal.    Use prescribed ear drops as directed. These help reduce swelling and fight the infection. If an ear wick was placed in the ear canal, apply drops right onto the end of the wick. The wick will draw the medicine into the ear canal even if it is swollen closed.    A cotton ball may be loosely placed in the outer ear to absorb any drainage.    You may use acetaminophen or ibuprofen to control pain, unless another medicine was prescribed. Note: If you have chronic liver or kidney disease or ever had a stomach ulcer or GI bleeding, talk to your healthcare provider before taking any of these medicines.    Do not allow water to get into your ear when bathing. Also, don't swim until the infection has cleared.  Prevention    Keep your ears dry. This helps lower the risk of infection. Dry your ears with a towel or hair dryer after getting wet. Also, use ear plugs when swimming.    Do not stick any objects in the ear to remove wax.    If you feel water trapped in your ear, use ear drops right away. You can get these drops over the counter at most drugstores. They work by removing water from the ear canal.  Follow-up care  Follow up with your healthcare provider in 1 week, or as advised.  When to seek medical advice  Call your healthcare provider right  away if any of these occur:    Ear pain becomes worse or doesn t improve after 3 days of treatment    Redness or swelling of the outer ear occurs or gets worse    Headache    Painful or stiff neck    Drowsiness or confusion    Fever of 100.4 F (38 C) or higher, or as directed by your healthcare provider    Seizure  Date Last Reviewed: 10/1/2017    0096-0325 [employer]. [address] [place] [address]. All rights reserved. This information is not intended as a substitute for professional medical care. Always follow your healthcare professional's instructions.

## 2019-02-18 ENCOUNTER — OFFICE VISIT (OUTPATIENT)
Dept: OPHTHALMOLOGY | Facility: CLINIC | Age: 59
End: 2019-02-18
Payer: COMMERCIAL

## 2019-02-18 DIAGNOSIS — H40.1131 PRIMARY OPEN ANGLE GLAUCOMA OF BOTH EYES, MILD STAGE: ICD-10-CM

## 2019-02-18 DIAGNOSIS — H52.03 HYPERMETROPIA OF BOTH EYES: ICD-10-CM

## 2019-02-18 DIAGNOSIS — H52.4 PRESBYOPIA: ICD-10-CM

## 2019-02-18 DIAGNOSIS — E11.9 TYPE 2 DIABETES MELLITUS WITHOUT COMPLICATION, WITHOUT LONG-TERM CURRENT USE OF INSULIN (H): Primary | ICD-10-CM

## 2019-02-18 DIAGNOSIS — H25.13 NUCLEAR SCLEROTIC CATARACT OF BOTH EYES: ICD-10-CM

## 2019-02-18 DIAGNOSIS — H52.223 REGULAR ASTIGMATISM OF BOTH EYES: ICD-10-CM

## 2019-02-18 PROCEDURE — 92014 COMPRE OPH EXAM EST PT 1/>: CPT | Performed by: STUDENT IN AN ORGANIZED HEALTH CARE EDUCATION/TRAINING PROGRAM

## 2019-02-18 PROCEDURE — 92015 DETERMINE REFRACTIVE STATE: CPT | Performed by: STUDENT IN AN ORGANIZED HEALTH CARE EDUCATION/TRAINING PROGRAM

## 2019-02-18 RX ORDER — LATANOPROST 50 UG/ML
1 SOLUTION/ DROPS OPHTHALMIC AT BEDTIME
Qty: 1 BOTTLE | Refills: 12 | Status: SHIPPED | OUTPATIENT
Start: 2019-02-18 | End: 2020-05-04

## 2019-02-18 ASSESSMENT — VISUAL ACUITY
OS_CC: 20/60
OS_CC+: -1
OS_PH_CC: 20/40
METHOD: SNELLEN - LINEAR
OD_CC: 20/40
OD_CC+: -1
CORRECTION_TYPE: GLASSES
OS_PH_CC+: -2

## 2019-02-18 ASSESSMENT — CUP TO DISC RATIO
OS_RATIO: 0.7
OD_RATIO: 0.65

## 2019-02-18 ASSESSMENT — REFRACTION_WEARINGRX
SPECS_TYPE: PAL
OD_SPHERE: +1.25
OS_CYLINDER: +0.75
OS_AXIS: 166
OD_AXIS: 009
OS_ADD: +2.75
OD_ADD: +2.75
OD_CYLINDER: +0.75
OS_SPHERE: +1.00

## 2019-02-18 ASSESSMENT — REFRACTION_MANIFEST
OS_CYLINDER: +1.50
OS_SPHERE: +1.25
OD_SPHERE: +1.00
OD_ADD: +2.75
OD_CYLINDER: +1.00
OS_ADD: +2.75
OS_AXIS: 175
OD_AXIS: 010

## 2019-02-18 ASSESSMENT — TONOMETRY
OD_IOP_MMHG: 12
IOP_METHOD: ICARE
OS_IOP_MMHG: 16

## 2019-02-18 ASSESSMENT — SLIT LAMP EXAM - LIDS
COMMENTS: NORMAL
COMMENTS: NORMAL

## 2019-02-18 ASSESSMENT — EXTERNAL EXAM - LEFT EYE: OS_EXAM: NORMAL

## 2019-02-18 ASSESSMENT — CONF VISUAL FIELD
OD_NORMAL: 1
METHOD: COUNTING FINGERS
OS_NORMAL: 1

## 2019-02-18 ASSESSMENT — EXTERNAL EXAM - RIGHT EYE: OD_EXAM: NORMAL

## 2019-02-18 NOTE — PROGRESS NOTES
Current Eye Medications:  Latanoprost at bedtime both eyes, last took at midnight.     Subjective:  Complete eye exam. Vision is little cloudy sometimes both eyes. No eye pain or discomfort in either eye.     Diabetic blood sugar has been a little high, working to bring down.  Lab Results   Component Value Date    A1C 8.6 11/29/2018    A1C 8.3 03/29/2018    A1C 7.1 10/12/2017    A1C 7.8 02/28/2017    A1C 7.5 04/07/2016        Objective:  See Ophthalmology Exam.      Assessment:  Ren Farrar is a 58 year old male who presents with:   Encounter Diagnoses   Name Primary?     Type 2 diabetes mellitus without complication, without long-term current use of insulin (H) Negative diabetic retinopathy      Primary open angle glaucoma of both eyes, mild stage; s/p SLT #1 OD 8/2018 Intraocular pressure 12/16 today.      Nuclear sclerotic cataract of both eyes        Plan:  Continue Latanoprost (teal top) at bedtime both eyes    Glasses prescription given  Keep blood sugars and blood pressure under good control.    Katherine Danielson MD  [phone]

## 2019-02-18 NOTE — LETTER
2/18/2019       RE: Ren Farrar  [address]  [place] [address]      Dear Dr. Humphrey,    Thank you for referring your patient, Ren Farrar, to the [facility].     No signs of diabetic retinopathy in either eye today.  Please see a copy of my visit note below.     Current Eye Medications:  Latanoprost at bedtime both eyes, last took at midnight.     Subjective:  Complete eye exam. Vision is little cloudy sometimes both eyes. No eye pain or discomfort in either eye.     Diabetic blood sugar has been a little high, working to bring down.  Lab Results   Component Value Date    A1C 8.6 11/29/2018    A1C 8.3 03/29/2018    A1C 7.1 10/12/2017    A1C 7.8 02/28/2017    A1C 7.5 04/07/2016        Objective:  See Ophthalmology Exam.      Assessment:  Ren Farrar is a 58 year old male who presents with:   Encounter Diagnoses   Name Primary?     Type 2 diabetes mellitus without complication, without long-term current use of insulin (H) Negative diabetic retinopathy      Primary open angle glaucoma of both eyes, mild stage; s/p SLT #1 OD 8/2018 Intraocular pressure 12/16 today.      Nuclear sclerotic cataract of both eyes        Plan:  Continue Latanoprost (teal top) at bedtime both eyes    Glasses prescription given  Keep blood sugars and blood pressure under good control.    Katherine Danielson MD  [phone]      Again, thank you for allowing me to participate in the care of your patient.        Sincerely,        Katherine Danielson MD

## 2019-02-18 NOTE — PATIENT INSTRUCTIONS
Continue Latanoprost (teal top) at bedtime both eyes      Glasses prescription given    Keep blood sugars and blood pressure under good control.    Katherine Danielson MD  [phone]    Patient Education   Diabetes weakens the blood vessels all over the body, including the eyes. Damage to the blood vessels in the eyes can cause swelling or bleeding into part of the eye (called the retina). This is called diabetic retinopathy ([facility]). If not treated, this disease can cause vision loss or blindness.   Symptoms may include blurred or distorted vision, but many people have no symptoms. It's important to see your eye doctor regularly to check for problems.   Early treatment and good control can help protect your vision. Here are the things you can do to help prevent vision loss:      1. Keep your blood sugar levels under tight control.      2. Bring high blood pressure under control.      3. No smoking.      4. Have yearly dilated eye exams.

## 2019-03-15 ENCOUNTER — OFFICE VISIT (OUTPATIENT)
Dept: FAMILY MEDICINE | Facility: CLINIC | Age: 59
End: 2019-03-15
Payer: COMMERCIAL

## 2019-03-15 VITALS
DIASTOLIC BLOOD PRESSURE: 76 MMHG | HEIGHT: 72 IN | TEMPERATURE: 98.7 F | RESPIRATION RATE: 20 BRPM | HEART RATE: 84 BPM | OXYGEN SATURATION: 96 % | BODY MASS INDEX: 32.64 KG/M2 | WEIGHT: 241 LBS | SYSTOLIC BLOOD PRESSURE: 135 MMHG

## 2019-03-15 DIAGNOSIS — I10 ESSENTIAL HYPERTENSION WITH GOAL BLOOD PRESSURE LESS THAN 140/90: ICD-10-CM

## 2019-03-15 DIAGNOSIS — E78.5 HYPERLIPIDEMIA LDL GOAL <100: ICD-10-CM

## 2019-03-15 DIAGNOSIS — M77.11 RIGHT LATERAL EPICONDYLITIS: ICD-10-CM

## 2019-03-15 DIAGNOSIS — E11.65 UNCONTROLLED TYPE 2 DIABETES MELLITUS WITH HYPERGLYCEMIA (H): Primary | ICD-10-CM

## 2019-03-15 DIAGNOSIS — Z23 NEED FOR SHINGLES VACCINE: ICD-10-CM

## 2019-03-15 DIAGNOSIS — E11.9 TYPE 2 DIABETES MELLITUS WITHOUT COMPLICATION, WITHOUT LONG-TERM CURRENT USE OF INSULIN (H): Primary | ICD-10-CM

## 2019-03-15 DIAGNOSIS — E11.65 UNCONTROLLED TYPE 2 DIABETES MELLITUS WITH HYPERGLYCEMIA (H): ICD-10-CM

## 2019-03-15 LAB
ALT SERPL W P-5'-P-CCNC: 27 U/L (ref 0–70)
ANION GAP SERPL CALCULATED.3IONS-SCNC: 9 MMOL/L (ref 3–14)
BUN SERPL-MCNC: 14 MG/DL (ref 7–30)
CALCIUM SERPL-MCNC: 9.1 MG/DL (ref 8.5–10.1)
CHLORIDE SERPL-SCNC: 106 MMOL/L (ref 94–109)
CHOLEST SERPL-MCNC: 191 MG/DL
CO2 SERPL-SCNC: 26 MMOL/L (ref 20–32)
CREAT SERPL-MCNC: 0.97 MG/DL (ref 0.66–1.25)
GFR SERPL CREATININE-BSD FRML MDRD: 84 ML/MIN/{1.73_M2}
GLUCOSE BLD-MCNC: 149 MG/DL (ref 70–99)
GLUCOSE SERPL-MCNC: 156 MG/DL (ref 70–99)
HBA1C MFR BLD: 7.5 % (ref 0–5.6)
HDLC SERPL-MCNC: 74 MG/DL
LDLC SERPL CALC-MCNC: 96 MG/DL
NONHDLC SERPL-MCNC: 117 MG/DL
POTASSIUM SERPL-SCNC: 3.9 MMOL/L (ref 3.4–5.3)
SODIUM SERPL-SCNC: 141 MMOL/L (ref 133–144)
TRIGL SERPL-MCNC: 107 MG/DL

## 2019-03-15 PROCEDURE — 80048 BASIC METABOLIC PNL TOTAL CA: CPT | Performed by: FAMILY MEDICINE

## 2019-03-15 PROCEDURE — 84460 ALANINE AMINO (ALT) (SGPT): CPT | Performed by: FAMILY MEDICINE

## 2019-03-15 PROCEDURE — 83036 HEMOGLOBIN GLYCOSYLATED A1C: CPT | Performed by: FAMILY MEDICINE

## 2019-03-15 PROCEDURE — 36415 COLL VENOUS BLD VENIPUNCTURE: CPT | Performed by: FAMILY MEDICINE

## 2019-03-15 PROCEDURE — 90750 HZV VACC RECOMBINANT IM: CPT | Performed by: FAMILY MEDICINE

## 2019-03-15 PROCEDURE — 80061 LIPID PANEL: CPT | Performed by: FAMILY MEDICINE

## 2019-03-15 PROCEDURE — 90471 IMMUNIZATION ADMIN: CPT | Performed by: FAMILY MEDICINE

## 2019-03-15 PROCEDURE — 82947 ASSAY GLUCOSE BLOOD QUANT: CPT | Performed by: FAMILY MEDICINE

## 2019-03-15 PROCEDURE — 99214 OFFICE O/P EST MOD 30 MIN: CPT | Mod: 25 | Performed by: FAMILY MEDICINE

## 2019-03-15 RX ORDER — AMLODIPINE AND BENAZEPRIL HYDROCHLORIDE 5; 20 MG/1; MG/1
1 CAPSULE ORAL DAILY
Qty: 90 CAPSULE | Refills: 1 | Status: SHIPPED | OUTPATIENT
Start: 2019-03-15 | End: 2019-10-24

## 2019-03-15 RX ORDER — NIACIN 500 MG/1
2000 TABLET, EXTENDED RELEASE ORAL AT BEDTIME
Qty: 360 TABLET | Refills: 1 | Status: SHIPPED | OUTPATIENT
Start: 2019-03-15 | End: 2019-10-24

## 2019-03-15 RX ORDER — METFORMIN HCL 500 MG
1000 TABLET, EXTENDED RELEASE 24 HR ORAL 2 TIMES DAILY WITH MEALS
Qty: 360 TABLET | Refills: 1 | Status: SHIPPED | OUTPATIENT
Start: 2019-03-15 | End: 2019-09-23

## 2019-03-15 ASSESSMENT — MIFFLIN-ST. JEOR: SCORE: 1939.42

## 2019-03-15 ASSESSMENT — PAIN SCALES - GENERAL: PAINLEVEL: NO PAIN (0)

## 2019-03-15 NOTE — NURSING NOTE
1.  Has the patient received the information for the Zostavax vaccine? YES    2.  Does the patient have any of the following contraindications?     Allergy to Neomycin or Gelatin? No       Current moderate or severe illness? No  Temp = 98.7  If temp > 99.0 degrees orally or patient has above allergies, vaccine is deferred    3.  The vaccine has been administered in the usual fashion and the patient was instructed to wait 15 minutes before leaving the building in the event of an allergic reaction: YES    Vaccination given by GENOVEVA Grijalva MA    Recorded by George Grijalva

## 2019-03-15 NOTE — PROGRESS NOTES
"  SUBJECTIVE:   Ren Farrar is a 58 year old male who presents to clinic today for the following health issues:      Diabetes Follow-up    Patient is checking blood sugars: twice daily.    Blood sugar testing frequency justification: Uncontrolled diabetes  Results are as follows:         am - 114 , pt notes fasting a.m. glc tends to be lower than before         suppertime - 180    Diabetic concerns: None     Symptoms of hypoglycemia (low blood sugar): none     Paresthesias (numbness or burning in feet) or sores: No     Date of last diabetic eye exam: 2/19/19    Diabetes Management Resources    Hyperlipidemia Follow-Up      Rate your low fat/cholesterol diet?: fair    Taking statin?  No    Other lipid medications/supplements?:  Niacin, without side effects    Hypertension Follow-up      Outpatient blood pressures are being checked at home.  Results are around today's BP.    Low Salt Diet: not monitoring salt    BP Readings from Last 2 Encounters:   03/15/19 135/76   12/30/18 144/77     Hemoglobin A1C (%)   Date Value   03/15/2019 7.5 (H)   11/29/2018 8.6 (H)     LDL Cholesterol Calculated (mg/dL)   Date Value   11/29/2018 89   03/29/2018 83       Amount of exercise or physical activity: None, no significant increase since last visit    Problems taking medications regularly: No    Medication side effects: none    Diet: low fat/cholesterol    Past medical, family, and social histories, medications, and allergies are reviewed and updated in Epic.     ROS:  Constitutional, HEENT, cardiovascular, pulmonary, gi and gu systems are negative, except as otherwise noted. Couple weeks of RT elbow pain.    OBJECTIVE:   /76 (BP Location: Left arm, Patient Position: Chair, Cuff Size: Adult Large)   Pulse 84   Temp 98.7  F (37.1  C) (Oral)   Resp 20   Ht 1.826 m (5' 11.89\")   Wt 109.3 kg (241 lb)   SpO2 96%   BMI 32.79 kg/m    Body mass index is 32.79 kg/m .  GENERAL: healthy, alert and no distress  EYES: Eyes grossly " normal to inspection, PERRL, EOMI, sclerae white and conjunctivae normal  RESP: lungs clear to auscultation - no crackles or wheezes, no areas of dullness, no tachypnea  CV: Heart regular rate and rhythm without murmur, click or rub. No peripheral edema and peripheral pulses strong  MS: no gross musculoskeletal defects noted, no edema. Pain RT elbow reproduced with resisted wrist extention/forearm rotation, and with palpation over the lateral epicondyl  SKIN: no suspicious lesions or rashes to visible skin  NEURO: Normal strength and tone, sensory exam grossly normal, mentation intact, oriented times 3 and cranial nerves 2-12 intact  PSYCH: mentation appears normal, affect normal/bright     Diagnostic Test Results:  Results for orders placed or performed in visit on 03/15/19 (from the past 24 hour(s))   Hemoglobin A1c   Result Value Ref Range    Hemoglobin A1C 7.5 (H) 0 - 5.6 %   Glucose whole blood   Result Value Ref Range    Glucose Whole Blood 149 (H) 70 - 99 mg/dL     Lab Results   Component Value Date    A1C 8.6 11/29/2018    A1C 8.3 03/29/2018    A1C 7.1 10/12/2017    A1C 7.8 02/28/2017    A1C 7.5 04/07/2016     ASSESSMENT/PLAN:     (E11.9) Type 2 diabetes mellitus without complication, without long-term current use of insulin (H)  (primary encounter diagnosis)  Comment: despite no change in weight or exer level, his HgbA1c as improved with addition of sitagliptin   Plan: metFORMIN (GLUCOPHAGE-XR) 500 MG 24 hr tablet,         sitagliptin (JANUVIA) 100 MG tablet        Return in about 6 months (around 9/3/2019) for full physical, diabetes. Pt states his HgbA1c will be significanty improved by then. Handout provided    (E78.5) Hyperlipidemia LDL goal <100  Comment:   Plan: niacin ER (NIASPAN) 500 MG CR tablet          (I10) Essential hypertension with goal blood pressure less than 140/90  Comment: well controlled  Plan: amLODIPine-benazepril (LOTREL) 5-20 MG capsule        F/u 6 mo    (M77.11) Right lateral  epicondylitis  Comment:   Plan: handouts provided. F/u prn    (Z23) Need for shingles vaccine  Comment: #2, no significant side effects with #1  Plan: HC ZOSTER VACCINE RECOMBINANT ADJUVANTED IM NJX              Humberto Humphrey MD

## 2019-03-15 NOTE — PATIENT INSTRUCTIONS
================================================================================  Normal Values   Blood pressure  <140/90 for most adults    <130/80 for some chronic diseases (ask your care team about yours)    BMI (body mass index)  18.5-25 kg/m2 (based on height and weight)     Thank you for visiting [facility]    Normal or non-critical lab and imaging results will be communicated to you by MyChart, letter or phone within 7 days.  If you do not hear from us within 10 days, please call the clinic. If you have a critical or abnormal lab result, we will notify you by phone as soon as possible.     If you have any questions regarding your visit please contact:     Team Comfort:   Clinic Hours Telephone Number   Dr. Humberto Brown Dr. [place] 7am-5pm  Monday - Friday [phone]  Yany RN  Sheyla Shipman RN   Pharmacy 8:00am-8pm Monday-Friday    9am-5pm Saturday-Sunday [phone]   Urgent Care 11am-9pm Monday-Friday        9am-5pm Saturday-Sunday [phone]     After hours, weekend or if you need to make an appointment with your primary provider please call [phone].   After Hours nurse advise: call [facility]: [phone]    Medication Refills:  Call your pharmacy and they will forward the refill to us. Please allow 3 business days for your refills to be completed.          Patient Education     Understanding Lateral Epicondylitis    Tendons are strong bands of tissue that connect muscles to bones. Lateral epicondylitis affects the tendons that connect muscles in the forearm to the lateral epicondyle. This is the bony knob on the outer side of the elbow. The condition occurs if the extensor tendons of the wrist become red and swollen (irritated). This can cause pain in the elbow, forearm, and wrist. Because the condition is sometimes caused by playing tennis, it is also known as  tennis elbow.   How to say it  Akira  [id number]   What causes lateral epicondylitis?  The condition most often occurs because of overuse. This can be from any activity that repeatedly puts stress on the forearm extensor muscles or tendons and wrist. For instance, playing tennis, lifting weights, cutting meat, painting, and typing can all cause the condition. Wear and tear of the tendons from aging or an injury to the tendons can also cause the condition.  Symptoms of lateral epicondylitis  The most common symptom is pain. You may feel it on the outer side of the elbow and down the back of the forearm. It may be worse when moving or using the elbow, forearm, or wrist. You may also feel pain when gripping or lifting things.  Treatment for lateral epicondylitis  Treatments may include:    Resting the elbow, forearm, and wrist. You ll need to avoid movements that can make your symptoms worse. You also may need to avoid certain sports and types of work for a time. This helps relieve symptoms and prevent further damage to the tendons.    Changing the action that caused the problem. For instance, if the tendons were damaged from playing tennis, it may help to change your playing technique or use different equipment. This helps prevent further damage to the tendons.    Using cold packs. Putting an ice pack on the injured area can help reduce pain and swelling.    Taking pain medicines. Taking prescription or over-the-counter pain medicines may help reduce pain and swelling.      Wearing a brace. This helps reduce strain on the muscles and tendons in the forearm, which may relieve symptoms. It is very important to wear the brace properly.    Doing exercises and physical therapy. These help improve strength and range of motion in the elbow, forearm, and wrist.    Getting shots of medicine into the injured area. These may help relieve symptoms for a time.    Having surgery. This may be an option if other treatments fail to relieve symptoms. In many  cases, the surgeon removes the damaged tissue.  Possible complications of lateral epicondylitis  If the tendons involved don t heal properly, symptoms may return or get worse. To help prevent this, follow your treatment plan as directed.  When to call your healthcare provider  Call your healthcare provider right away if you have any of these:    Fever of 100.4 F (38 C) or higher, or as directed    Redness, swelling, or warmth in the elbow or forearm that gets worse    Symptoms that don t get better with treatment, or get worse    New symptoms   Date Last Reviewed: 3/10/2016    9700-1909 [employer]. [address] [place], [place] [id number]. All rights reserved. This information is not intended as a substitute for professional medical care. Always follow your healthcare professional's instructions.           Patient Education     Tennis Elbow  Muscles connect to bones by thick, fibrous cords (tendons). When the muscles are overused by repeated motion, the tendons may become inflamed and painful. This condition is called tendonitis.  Tennis elbow (lateral epicondylitis) is a form of tendonitis. It occurs when the forearm muscles are used again and again in a twisting motion. Pain from tennis elbow occurs mainly on the outside of the elbow. But the pain can spread into the forearm and wrist. Your elbow may also be swollen and tender to the touch.  The pain may get worse when you move your arm or do simple activities. Bending your wrist back, shaking hands, or turning a doorknob may cause pain. The pain often gets worse after several weeks or months. Sometimes you may feel pain when your arm is still.  Tennis players who use a backhand stroke with poor technique are more likely to get tennis elbow. But playing tennis is only one cause of tennis elbow. Other common activities that can cause it include:    Hammering    Painting    Raking  Besides tennis players, people at risk include , gardeners,  musicians, and dentists. Sometimes people get tennis elbow without doing anything that would cause the injury.  Treatment includes resting the arm and taking anti-inflammatory medicines. Special splints can help ease symptoms. Symptoms should get better after 4 to 6 weeks of rest. You may need steroid injections if resting and using a splint don t help. After the pain is relieved, you should change your activities so the symptoms don t return. You may need physical therapy. It may include stretching, range-of-motion, and strengthening exercises. These treatments help most cases. You may need surgery if your symptoms continue for 6 months despite treatment.  Home care  Follow these guidelines when caring for yourself at home:    Rest your elbow as needed. Protect it from movement that causes pain. You may be told to use a forearm splint at night to ease symptoms in the morning. Your healthcare provider may recommend a special wrap or splint to compress the muscles of the forearm. This can ease pain during daytime activities. As your symptoms get better, start to move your elbow more.    Put an ice pack on the injured area. Do this for 20 minutes every 1 to 2 hours the first day for pain relief. You can make an ice pack by wrapping a plastic bag of ice cubes in a thin towel. Continue using the ice pack 3 to 4 times a day for the next several days. Then use the ice pack as needed to ease pain and swelling.    You may use acetaminophen or ibuprofen to control pain, unless another pain medicine was prescribed. If you have chronic liver or kidney disease, talk with your healthcare provider before using these medicines. Also talk with your provider if you ve had a stomach ulcer or gastrointestinal bleeding.    After your elbow heals, avoid the motion that caused your pain. Or learn to move in a way that causes less stress on the tendon. Using a forearm wrap may keep tennis elbow from happening again.    A tennis elbow  strap may ease pain and keep you from further injury when you start playing tennis again. You can also lower your risk for injury by warming up before you play and cooling down afterward. You should also use the right equipment. For instance, make sure your racquet has the right  and is the right size for you.  Follow-up care  Follow up with your healthcare provider, or as advised, if your symptoms don t get better after 2 to 3 weeks of treatment.  When to seek medical advice  Call your healthcare provider right away if any of these occur:    Redness over the painful area    Pain, stiffness,  or swelling at the elbow gets worse    Any numbness or tingling in your arm, hands, or fingers    Unexplained fever over 100.4 F (38 C)   Date Last Reviewed: 5/1/2017 2000-2018 [employer]. [address] [place] [id number]. All rights reserved. This information is not intended as a substitute for professional medical care. Always follow your healthcare professional's instructions.           Patient Education     Treating Tennis Elbow    Your treatment will depend on how inflamed your tendon is. The goal is to relieve your symptoms and help you regain full use of your elbow.  Rest and medicine  Wearing a tennis elbow splint allows the inflamed tendon to rest. It must be worn properly. It should be placed down the arm past the painful area of the elbow. If it is directly over the inflamed tendon, it can worsen the symptoms. This brace can help the tendon heal. Using your other hand or changing your  also takes stress off the tendon. Oral nonsteroidal anti-inflammatory medicines (NSAIDs) and ice can relieve pain and reduce swelling.  Exercises and therapy  Your healthcare provider may give you an exercise program. He or she may refer you to a physical therapist. The physical therapist will teach you how to gently stretch and strengthen the muscles around your elbow.  Anti-inflammatory  injections  Your healthcare provider may give you injections of an anti-inflammatory medicine, such as cortisone. This helps reduce swelling. You may have more pain at first. But in a few days, your elbow should feel better.  If surgery is needed  If your symptoms persist for a long time, or other treatments don t work, your healthcare provider may recommend surgery. Surgery repairs the inflamed tendon.   Date Last Reviewed: 1/1/2018 2000-2018 [employer]. [address] [place] [address], [place] [address]. All rights reserved. This information is not intended as a substitute for professional medical care. Always follow your healthcare professional's instructions.    Patient Education     Diabetes and Alcohol Consumption    If you have diabetes, you need to be careful with alcohol. Alcohol can affect how well you control your blood sugar (glucose) level. It can also increase risks to your health. Before choosing to drink alcohol, discuss it with your healthcare provider. He or she can help you decide whether you can drink safely. This sheet tells you more about the risks of drinking alcohol. It also gives you tips for staying safe when you drink.  How alcohol can affect your diabetes  Here are some of the ways alcohol can affect your health if you have diabetes:    It can make certain health problems worse. Alcohol may worsen disease of the liver, kidney, or pancreas. It may also make nerve or eye damage more likely. If you have any of these health problems, your healthcare provider will likely advise you not to drink alcohol. It can increase your blood pressure and triglycerides.     It can increase your risk for low blood sugar (hypoglycemia). The liver helps prevent low blood sugar by releasing extra glucose into the blood. Alcohol in the blood keeps the liver from doing this. Low blood sugar is more likely if you drink alcohol on an empty stomach or during or right after exercise. It is also more likely  if you take insulin or medicines that help lower blood sugar. Also, alcohol may affect your ability to tell whether you have symptoms of low blood sugar. This may keep you from getting needed treatment.    It can increase your risk for high blood sugar (hyperglycemia). Many alcoholic drinks contain carbohydrates (carbs). These include beers, sweeter julio, and drinks mixed with fruit juices or sugar. Carbs raise blood sugar levels higher and faster than other kinds of foods. Drinking may throw off your ability to monitor your carbs. Be sure to check your blood sugar more often if you are drinking alcohol.     It can affect how well you manage your weight. Alcohol is high in calories and has no nutrition. If you are on a meal plan to help control your weight, you will need to count alcohol as part of your daily calorie intake. A standard drink is usually counted as 90 calories or 2 fat exchanges. In addition, alcohol can cause you to feel hungrier than normal. This makes you more likely to overeat. This can affect your weight and blood sugar level.  Tips for safer drinking  Your healthcare provider may give you the OK to drink in moderation. Here are some steps you can take to drink safely:    Strictly follow the drink limits given to you by your healthcare provider. Or use the American Diabetes Association guidelines (see box below).    Check your blood sugar level before drinking. Do not drink if your blood sugar level is too low or too high. Also, check your blood sugar level after drinking or before going to bed. This is because alcohol can stay in the blood for many hours after drinking. If your blood sugar level is low or dropping, you may be able to treat it with a snack or glucose tablet before it worsens.    If you use carbohydrate counting to match your meals with your insulin doses, do not count calories from alcohol to substitute for nutritious food.     Ask your healthcare provider, including your  pharmacist, how alcohol will affect insulin or any medicines you take.    Never drink on an empty stomach.    Never drink during or after exercise.    Do not drink any alcohol if you are going to drive.    Be smart about what you drink. This means choosing drinks that are lower in alcohol, calories, and carbohydrates. Choices include dry julio, light beers, or mixed drinks with sugar-free juice, club soda, or sparkling water.    Carry medical ID that tells others you have diabetes. This helps ensure that you receive proper treatment, if needed.  American Diabetes Association Alcohol guidelines  If your healthcare provider has cleared you to drink, limit drinking to:    Women: No more than 1 drink a day    Men: No more than 2 drinks a day  One drink equals 12 ounces of beer, 5 ounces of wine, or 1.5 ounces of hard liquor (for example, vodka, whiskey, or gin).    Date Last Reviewed: 5/1/2016 2000-2018 [employer]. [address] [place] [address], [place] [address]. All rights reserved. This information is not intended as a substitute for professional medical care. Always follow your healthcare professional's instructions.

## 2019-05-07 ENCOUNTER — OFFICE VISIT (OUTPATIENT)
Dept: FAMILY MEDICINE | Facility: CLINIC | Age: 59
End: 2019-05-07
Payer: COMMERCIAL

## 2019-05-07 ENCOUNTER — ANCILLARY PROCEDURE (OUTPATIENT)
Dept: GENERAL RADIOLOGY | Facility: CLINIC | Age: 59
End: 2019-05-07
Attending: PHYSICIAN ASSISTANT
Payer: COMMERCIAL

## 2019-05-07 VITALS
HEIGHT: 72 IN | DIASTOLIC BLOOD PRESSURE: 79 MMHG | HEART RATE: 85 BPM | RESPIRATION RATE: 18 BRPM | BODY MASS INDEX: 32.44 KG/M2 | WEIGHT: 239.5 LBS | SYSTOLIC BLOOD PRESSURE: 130 MMHG | OXYGEN SATURATION: 96 % | TEMPERATURE: 98.7 F

## 2019-05-07 DIAGNOSIS — R60.9 DEPENDENT EDEMA: ICD-10-CM

## 2019-05-07 DIAGNOSIS — M77.11 LATERAL EPICONDYLITIS OF RIGHT ELBOW: Primary | ICD-10-CM

## 2019-05-07 DIAGNOSIS — M77.11 LATERAL EPICONDYLITIS OF RIGHT ELBOW: ICD-10-CM

## 2019-05-07 PROCEDURE — 73070 X-RAY EXAM OF ELBOW: CPT | Mod: RT | Performed by: FAMILY MEDICINE

## 2019-05-07 PROCEDURE — 99214 OFFICE O/P EST MOD 30 MIN: CPT | Performed by: PHYSICIAN ASSISTANT

## 2019-05-07 RX ORDER — NAPROXEN 500 MG/1
500 TABLET ORAL 2 TIMES DAILY WITH MEALS
Qty: 30 TABLET | Refills: 1 | Status: SHIPPED | OUTPATIENT
Start: 2019-05-07 | End: 2021-01-25 | Stop reason: ALTCHOICE

## 2019-05-07 RX ORDER — HYDROCHLOROTHIAZIDE 12.5 MG/1
25 TABLET ORAL DAILY
Qty: 30 TABLET | Refills: 11 | Status: SHIPPED | OUTPATIENT
Start: 2019-05-07 | End: 2019-09-22

## 2019-05-07 ASSESSMENT — PAIN SCALES - GENERAL: PAINLEVEL: WORST PAIN (10)

## 2019-05-07 ASSESSMENT — MIFFLIN-ST. JEOR: SCORE: 1932.61

## 2019-05-07 NOTE — PROGRESS NOTES
SUBJECTIVE:   Ren Farrar is a 58 year old male who presents to clinic today for the following   health issues:    Joint Pain    Onset: 6 months     Description:   Location: right arm  Character: Sharp    Intensity: severe    Progression of Symptoms: worse    Accompanying Signs & Symptoms:  Other symptoms: radiation of pain to down the arm    History:   Previous similar pain: no       Precipitating factors:   Trauma or overuse: no     Alleviating factors:  Improved by: heat and rub     Therapies Tried and outcome: Heat, tylenol, rub- relief then comes back    Swelling of feet     Onset: 2-3 weeks    Description:   Location: both feet but manly the left   Character: No pain just swelling     Intensity: mild    Progression of Symptoms: worse    Accompanying Signs & Symptoms:  Other symptoms: swelling    History:   Previous similar pain: no       Precipitating factors:   Trauma or overuse: no , prolonged standing makes it worse    Alleviating factors:  Improved by: lifting legs up. Mostly resolved in the morning, but then starts to worsen the longer he is upright    Therapies Tried and outcome: Noen        Additional history: as documented    Reviewed  and updated as needed this visit by clinical staff  Tobacco  Allergies  Meds  Problems  Med Hx  Surg Hx  Fam Hx  Soc Hx          Reviewed and updated as needed this visit by Provider  Tobacco  Allergies  Meds  Problems  Med Hx  Surg Hx  Fam Hx         Patient Active Problem List   Diagnosis     DARIA (obstructive sleep apnea)     Uncontrolled type 2 diabetes mellitus (H)     Hyperlipidemia LDL goal <100     Gastroesophageal reflux disease without esophagitis     Erectile dysfunction     Essential hypertension with goal blood pressure less than 140/90     Pain in joint, shoulder region     Obesity, Class I, BMI 30-34.9     Osteoarthritis of left hip     Nuclear sclerotic cataract of both eyes     Advanced directives, counseling/discussion     Adhesive  capsulitis     Shoulder impingement     OAG (open angle glaucoma)     Anemia     Past Surgical History:   Procedure Laterality Date     NO HISTORY OF SURGERY       SELECTIVE LASER TRABECULOPLASTY (SLT) OD (RIGHT EYE) Right 2018    SLT #1 OD (inf 180)       Social History     Tobacco Use     Smoking status: Never Smoker     Smokeless tobacco: Never Used   Substance Use Topics     Alcohol use: Yes     Comment: occassioanally     Family History   Problem Relation Age of Onset     C.A.D. Mother      Myocardial Infarction Mother      Hypertension Mother      Diabetes Mother         borderline     Eye Disorder Mother         cataract     Kidney Disease Mother         dialysis     Diabetes Father      Hypertension Father      Heart Failure Father          CHF 81     Diabetes Sister      Thyroid Disease Sister      Depression Son 17     Asthma No family hx of      Cerebrovascular Disease No family hx of      Breast Cancer No family hx of      Cancer - colorectal No family hx of      Prostate Cancer No family hx of      Allergies No family hx of      Cancer No family hx of      Glaucoma No family hx of      Macular Degeneration No family hx of          Current Outpatient Medications   Medication Sig Dispense Refill     amLODIPine-benazepril (LOTREL) 5-20 MG capsule Take 1 capsule by mouth daily for blood pressure. 90 capsule 1     ASPIRIN 81 MG PO TABS 1 TABLET DAILY*  prn     diclofenac (VOLTAREN) 50 MG EC tablet Take 1 tablet (50 mg) by mouth 3 times daily as needed for moderate pain (toe) . Take with food. 60 tablet 1     hydrochlorothiazide (HYDRODIURIL) 12.5 MG tablet Take 2 tablets (25 mg) by mouth daily 30 tablet 11     latanoprost (XALATAN) 0.005 % ophthalmic solution Place 1 drop into both eyes At Bedtime 1 Bottle 12     metFORMIN (GLUCOPHAGE-XR) 500 MG 24 hr tablet Take 2 tablets (1,000 mg) by mouth 2 times daily (with meals) for diabetes. 360 tablet 1     naproxen (NAPROSYN) 500 MG tablet Take 1 tablet  "(500 mg) by mouth 2 times daily (with meals) 30 tablet 1     niacin ER (NIASPAN) 500 MG CR tablet Take 4 tablets (2,000 mg) by mouth At Bedtime for cholesterol. Take with low-fat snack. 360 tablet 1     order for DME Equipment being ordered: JOBST stocking knee high 20-30 mm Hg 4 Device 1     order for DME Equipment being ordered: elbow band 1 Device 0     sitagliptin (JANUVIA) 100 MG tablet Take 1 tablet (100 mg) by mouth daily for diabetes. 90 tablet 1     STATIN NOT PRESCRIBED, INTENTIONAL, Causes muscle aches       Allergies   Allergen Reactions     Atorvastatin Calcium      Myalgias, increased CPK     Simvastatin      myalgias       ROS:  Constitutional, HEENT, cardiovascular, pulmonary, gi and gu systems are negative, except as otherwise noted.    OBJECTIVE:     /79 (BP Location: Right arm, Patient Position: Sitting, Cuff Size: Adult Large)   Pulse 85   Temp 98.7  F (37.1  C) (Oral)   Resp 18   Ht 1.826 m (5' 11.89\")   Wt 108.6 kg (239 lb 8 oz)   SpO2 96%   BMI 32.58 kg/m    Body mass index is 32.58 kg/m .  GENERAL: healthy, alert and no distress  NECK: no adenopathy, no asymmetry, masses, or scars and thyroid normal to palpation  RESP: lungs clear to auscultation - no rales, rhonchi or wheezes  CV: regular rates and rhythm, normal S1 S2, no S3 or S4, no murmur, click or rub, peripheral pulses strong and no mild edema  ABDOMEN: soft, nontender, no hepatosplenomegaly, no masses and bowel sounds normal  MS: +1  edema to ankle bilaterally     Diagnostic Test Results:  Xray - independent read no acute fracture or dislocation    ASSESSMENT/PLAN:       ICD-10-CM    1. Lateral epicondylitis of right elbow M77.11 XR Elbow Right 2 Views     order for DME     naproxen (NAPROSYN) 500 MG tablet   2. Dependent edema R60.9 order for DME     hydrochlorothiazide (HYDRODIURIL) 12.5 MG tablet     1.Wear elbow strap  Naproxen 1 tablet twice a day with food for 5 days then use as needed   Apply ice pack three times a " day for 20-30 min each time  Avoid heavy     2.Wear compression stockings daily  May take off at night  Start hydrochlorothiazide 12.5 mg daily   Elevate feet  Follow up in 1 month with primary care provider  or sooner as needed if worsens       Mirta Braga PA-C  [facility]

## 2019-05-07 NOTE — PATIENT INSTRUCTIONS
Wear elbow strap  Naproxen 1 tablet twice a day with food for 5 days then use as needed   Apply ice pack three times a day for 20-30 min each time  Avoid heavy     Wear compression stockings daily  May take off at night  Start hydrochlorothiazide 12.5 mg daily   Elevate feet  Follow up in 1 month with primary care provider  or sooner as needed if worsens   Patient Education     Tennis Elbow  Muscles connect to bones by thick, fibrous cords (tendons). When the muscles are overused by repeated motion, the tendons may become inflamed and painful. This condition is called tendonitis.  Tennis elbow (lateral epicondylitis) is a form of tendonitis. It occurs when the forearm muscles are used again and again in a twisting motion. Pain from tennis elbow occurs mainly on the outside of the elbow. But the pain can spread into the forearm and wrist. Your elbow may also be swollen and tender to the touch.  The pain may get worse when you move your arm or do simple activities. Bending your wrist back, shaking hands, or turning a doorknob may cause pain. The pain often gets worse after several weeks or months. Sometimes you may feel pain when your arm is still.  Tennis players who use a backhand stroke with poor technique are more likely to get tennis elbow. But playing tennis is only one cause of tennis elbow. Other common activities that can cause it include:    Hammering    Painting    Raking  Besides tennis players, people at risk include , gardeners, musicians, and dentists. Sometimes people get tennis elbow without doing anything that would cause the injury.  Treatment includes resting the arm and taking anti-inflammatory medicines. Special splints can help ease symptoms. Symptoms should get better after 4 to 6 weeks of rest. You may need steroid injections if resting and using a splint don t help. After the pain is relieved, you should change your activities so the symptoms don t return. You may need physical  therapy. It may include stretching, range-of-motion, and strengthening exercises. These treatments help most cases. You may need surgery if your symptoms continue for 6 months despite treatment.  Home care  Follow these guidelines when caring for yourself at home:    Rest your elbow as needed. Protect it from movement that causes pain. You may be told to use a forearm splint at night to ease symptoms in the morning. Your healthcare provider may recommend a special wrap or splint to compress the muscles of the forearm. This can ease pain during daytime activities. As your symptoms get better, start to move your elbow more.    Put an ice pack on the injured area. Do this for 20 minutes every 1 to 2 hours the first day for pain relief. You can make an ice pack by wrapping a plastic bag of ice cubes in a thin towel. Continue using the ice pack 3 to 4 times a day for the next several days. Then use the ice pack as needed to ease pain and swelling.    You may use acetaminophen or ibuprofen to control pain, unless another pain medicine was prescribed. If you have chronic liver or kidney disease, talk with your healthcare provider before using these medicines. Also talk with your provider if you ve had a stomach ulcer or gastrointestinal bleeding.    After your elbow heals, avoid the motion that caused your pain. Or learn to move in a way that causes less stress on the tendon. Using a forearm wrap may keep tennis elbow from happening again.    A tennis elbow strap may ease pain and keep you from further injury when you start playing tennis again. You can also lower your risk for injury by warming up before you play and cooling down afterward. You should also use the right equipment. For instance, make sure your racquet has the right  and is the right size for you.  Follow-up care  Follow up with your healthcare provider, or as advised, if your symptoms don t get better after 2 to 3 weeks of treatment.  When to seek  medical advice  Call your healthcare provider right away if any of these occur:    Redness over the painful area    Pain, stiffness,  or swelling at the elbow gets worse    Any numbness or tingling in your arm, hands, or fingers    Unexplained fever over 100.4 F (38 C)   Date Last Reviewed: 5/1/2017 2000-2018 [employer] [address] [place] [address]. All rights reserved. This information is not intended as a substitute for professional medical care. Always follow your healthcare professional's instructions.           Patient Education     Treating Tennis Elbow    Your treatment will depend on how inflamed your tendon is. The goal is to relieve your symptoms and help you regain full use of your elbow.  Rest and medicine  Wearing a tennis elbow splint allows the inflamed tendon to rest. It must be worn properly. It should be placed down the arm past the painful area of the elbow. If it is directly over the inflamed tendon, it can worsen the symptoms. This brace can help the tendon heal. Using your other hand or changing your  also takes stress off the tendon. Oral nonsteroidal anti-inflammatory medicines (NSAIDs) and ice can relieve pain and reduce swelling.  Exercises and therapy  Your healthcare provider may give you an exercise program. He or she may refer you to a physical therapist. The physical therapist will teach you how to gently stretch and strengthen the muscles around your elbow.  Anti-inflammatory injections  Your healthcare provider may give you injections of an anti-inflammatory medicine, such as cortisone. This helps reduce swelling. You may have more pain at first. But in a few days, your elbow should feel better.  If surgery is needed  If your symptoms persist for a long time, or other treatments don t work, your healthcare provider may recommend surgery. Surgery repairs the inflamed tendon.  Date Last Reviewed: 1/1/2018 2000-2018 [employer] [address]  [place] [address]. All rights reserved. This information is not intended as a substitute for professional medical care. Always follow your healthcare professional's instructions.

## 2019-09-22 DIAGNOSIS — R60.9 DEPENDENT EDEMA: ICD-10-CM

## 2019-09-22 NOTE — TELEPHONE ENCOUNTER
"Requested Prescriptions   Pending Prescriptions Disp Refills     hydrochlorothiazide (HYDRODIURIL) 12.5 MG tablet [Pharmacy Med Name: HYDROCHLOROTHIAZIDE 12.5 MG TB]  Last Written Prescription Date:  5/7/19  Last Fill Quantity: 30,  # refills: 11   Last Office Visit with G, P or [facility] prescribing provider:  5/7/19   Future Office Visit:      180 tablet 1     Sig: TAKE 2 TABLETS (25 MG) BY MOUTH DAILY       Diuretics (Including Combos) Protocol Passed - 9/22/2019 12:29 AM        Passed - Blood pressure under 140/90 in past 12 months     BP Readings from Last 3 Encounters:   05/07/19 130/79   03/15/19 135/76   12/30/18 144/77                 Passed - Recent (12 mo) or future (30 days) visit within the authorizing provider's specialty     Patient had office visit in the last 12 months or has a visit in the next 30 days with authorizing provider or within the authorizing provider's specialty.  See \"Patient Info\" tab in inbasket, or \"Choose Columns\" in Meds & Orders section of the refill encounter.              Passed - Medication is active on med list        Passed - Patient is age 18 or older        Passed - Normal serum creatinine on file in past 12 months     Recent Labs   Lab Test 03/15/19  1049   CR 0.97              Passed - Normal serum potassium on file in past 12 months     Recent Labs   Lab Test 03/15/19  1049   POTASSIUM 3.9                    Passed - Normal serum sodium on file in past 12 months     Recent Labs   Lab Test 03/15/19  1049                   "

## 2019-09-23 DIAGNOSIS — E11.9 TYPE 2 DIABETES MELLITUS WITHOUT COMPLICATION, WITHOUT LONG-TERM CURRENT USE OF INSULIN (H): ICD-10-CM

## 2019-09-23 NOTE — TELEPHONE ENCOUNTER
"Requested Prescriptions   Pending Prescriptions Disp Refills     JANUVIA 100 MG tablet [Pharmacy Med Name: JANUVIA 100 MG TABLET]  Last Written Prescription Date:  03/15/19  Last Fill Quantity: 90,  # refills: 1   Last Office Visit with [facility] or [facility] prescribing provider:  05/07/19Faith   Future Office Visit:    90 tablet 0     Sig: TAKE 1 TABLET (100 MG) BY MOUTH DAILY FOR DIABETES.       DPP4 Inhibitors Protocol Failed - 9/23/2019  2:18 AM        Failed - HgbA1C in past 3 or 6 months     If HgbA1C is 8 or greater, it needs to be on file within the past 3 months.  If less than 8, must be on file within the past 6 months.     Recent Labs   Lab Test 03/15/19  1049   A1C 7.5*             Passed - Blood pressure less than 140/90 in past 6 months     BP Readings from Last 3 Encounters:   05/07/19 130/79   03/15/19 135/76   12/30/18 144/77                 Passed - LDL on file in past 12 months     Recent Labs   Lab Test 03/15/19  1049   LDL 96             Passed - Microalbumin on file in past 12 months     Recent Labs   Lab Test 11/29/18  1001   MICROL 10   UMALCR 3.97             Passed - Medication is active on med list        Passed - Patient is age 18 or older        Passed - Normal serum creatinine in past 12 months     Recent Labs   Lab Test 03/15/19  1049   CR 0.97             Passed - Recent (6 mo) or future (30 days) visit within the authorizing provider's specialty     Patient had office visit in the last 6 months or has a visit in the next 30 days with authorizing provider.  See \"Patient Info\" tab in inbasket, or \"Choose Columns\" in Meds & Orders section of the refill encounter.            metFORMIN (GLUCOPHAGE-XR) 500 MG 24 hr tablet [Pharmacy Med Name: METFORMIN HCL  MG TABLET]  Last Written Prescription Date:  03/15/19  Last Fill Quantity: 360,  # refills: 0   Last Office Visit with [facility] or  [facility] prescribing provider:  05/09/19-Maria Luz   Future Office Visit:    360 tablet 0     Sig: " "TAKE 2 TABLETS (1,000 MG) BY MOUTH 2 TIMES DAILY (WITH MEALS) FOR DIABETES.       Biguanide Agents Failed - 9/23/2019  2:18 AM        Failed - Patient has documented A1c within the specified period of time.     If HgbA1C is 8 or greater, it needs to be on file within the past 3 months.  If less than 8, must be on file within the past 6 months.     Recent Labs   Lab Test 03/15/19  1049   A1C 7.5*             Passed - Blood pressure less than 140/90 in past 6 months     BP Readings from Last 3 Encounters:   05/07/19 130/79   03/15/19 135/76   12/30/18 144/77                 Passed - Patient has documented LDL within the past 12 mos.     Recent Labs   Lab Test 03/15/19  1049   LDL 96             Passed - Patient has had a Microalbumin in the past 15 mos.     Recent Labs   Lab Test 11/29/18  1001   MICROL 10   UMALCR 3.97             Passed - Patient is age 10 or older        Passed - Patient's CR is NOT>1.4 OR Patient's EGFR is NOT<45 within past 12 mos.     Recent Labs   Lab Test 03/15/19  1049   GFRESTIMATED 84   GFRESTBLACK >90       Recent Labs   Lab Test 03/15/19  1049   CR 0.97             Passed - Patient does NOT have a diagnosis of CHF.        Passed - Medication is active on med list        Passed - Recent (6 mo) or future (30 days) visit within the authorizing provider's specialty     Patient had office visit in the last 6 months or has a visit in the next 30 days with authorizing provider or within the authorizing provider's specialty.  See \"Patient Info\" tab in inbasket, or \"Choose Columns\" in Meds & Orders section of the refill encounter.              "

## 2019-09-24 ENCOUNTER — TELEPHONE (OUTPATIENT)
Dept: FAMILY MEDICINE | Facility: CLINIC | Age: 59
End: 2019-09-24

## 2019-09-24 DIAGNOSIS — E11.65 UNCONTROLLED TYPE 2 DIABETES MELLITUS WITH HYPERGLYCEMIA (H): Primary | ICD-10-CM

## 2019-09-24 RX ORDER — HYDROCHLOROTHIAZIDE 12.5 MG/1
25 TABLET ORAL DAILY
Qty: 180 TABLET | Refills: 1 | Status: SHIPPED | OUTPATIENT
Start: 2019-09-24 | End: 2019-10-24

## 2019-09-24 RX ORDER — LANCETS
EACH MISCELLANEOUS
Qty: 200 EACH | Refills: 6 | Status: SHIPPED | OUTPATIENT
Start: 2019-09-24

## 2019-09-24 RX ORDER — GLUCOSAMINE HCL/CHONDROITIN SU 500-400 MG
CAPSULE ORAL
Qty: 100 EACH | Refills: 3 | Status: SHIPPED | OUTPATIENT
Start: 2019-09-24

## 2019-09-24 NOTE — TELEPHONE ENCOUNTER
Reason for Call:  Other prescription    Detailed comments: Pt would like to be switched back to Accu-check for his at home blood sugar testing if possible.  He would like a call back to know if this is possible. Thank you.    [facility]/pharmacy #[id number] - [place], [place] - [address]  [phone]    Phone Number Patient can be reached at: Home number on file [phone] (home)    Best Time: any    Can we leave a detailed message on this number? YES    Call taken on 9/24/2019 at 2:34 PM by Jaci Duran    Accu/check Blood testing strips for at home.

## 2019-09-24 NOTE — TELEPHONE ENCOUNTER
Requested Prescriptions   Signed Prescriptions Disp Refills    blood glucose monitoring (NO BRAND SPECIFIED) meter device kit 1 kit 0     Sig: Use to test blood sugar 4 times daily or as directed. Preferred blood glucose meter/supplies to accompany: Monitor Brands: per insurance.       There is no refill protocol information for this order       blood glucose (NO BRAND SPECIFIED) test strip 100 strip 6     Sig: Use to test blood sugar 4 times daily or as directed. To accompany: Blood Glucose Monitor Brands: per insurance.       There is no refill protocol information for this order       blood glucose calibration (NO BRAND SPECIFIED) solution 1 Bottle 3     Sig: To accompany: Blood Glucose Monitor Brands: per insurance.       There is no refill protocol information for this order       thin (NO BRAND SPECIFIED) lancets 200 each 6     Sig: Use with lanceting device. To accompany: Blood Glucose Monitor Brands: per insurance.       There is no refill protocol information for this order       alcohol swab prep pads 100 each 3     Sig: Use to swab area of injection/yanci as directed.       There is no refill protocol information for this order          Prescription approved per [facility] Refill Protocol.    Dagoberto Huff RN, BSN, PHN

## 2019-09-24 NOTE — TELEPHONE ENCOUNTER
Prescription approved per [facility]ill Protocol.    Bijal Claire RN

## 2019-09-24 NOTE — TELEPHONE ENCOUNTER
Called patient and he wants a new glucometer Accu-chek and supplies sent to pharmacy listed below. Had old machine for long time, last filled supplies abput a year ago.    Dion Rodriguez CMA

## 2019-09-25 RX ORDER — SITAGLIPTIN 100 MG/1
TABLET, FILM COATED ORAL
Qty: 30 TABLET | Refills: 0 | Status: SHIPPED | OUTPATIENT
Start: 2019-09-25 | End: 2019-10-24

## 2019-09-25 RX ORDER — METFORMIN HCL 500 MG
1000 TABLET, EXTENDED RELEASE 24 HR ORAL 2 TIMES DAILY WITH MEALS
Qty: 120 TABLET | Refills: 0 | Status: SHIPPED | OUTPATIENT
Start: 2019-09-25 | End: 2019-10-24

## 2019-09-25 NOTE — TELEPHONE ENCOUNTER
Medication is being filled for 1 time refill only due to:  Patient needs to be seen because of diabetes. Sent patient a message via [employer] of the need for an appointment.    Concepcion Odom RN, [facility]

## 2019-10-24 ENCOUNTER — OFFICE VISIT (OUTPATIENT)
Dept: FAMILY MEDICINE | Facility: CLINIC | Age: 59
End: 2019-10-24
Payer: COMMERCIAL

## 2019-10-24 VITALS
SYSTOLIC BLOOD PRESSURE: 132 MMHG | TEMPERATURE: 97.8 F | RESPIRATION RATE: 16 BRPM | WEIGHT: 237 LBS | DIASTOLIC BLOOD PRESSURE: 80 MMHG | OXYGEN SATURATION: 98 % | HEIGHT: 72 IN | BODY MASS INDEX: 32.1 KG/M2 | HEART RATE: 90 BPM

## 2019-10-24 DIAGNOSIS — I10 ESSENTIAL HYPERTENSION WITH GOAL BLOOD PRESSURE LESS THAN 140/90: ICD-10-CM

## 2019-10-24 DIAGNOSIS — E11.9 TYPE 2 DIABETES MELLITUS WITHOUT COMPLICATION, WITHOUT LONG-TERM CURRENT USE OF INSULIN (H): ICD-10-CM

## 2019-10-24 DIAGNOSIS — Z23 NEED FOR INFLUENZA VACCINATION: ICD-10-CM

## 2019-10-24 DIAGNOSIS — Z23 NEED FOR VACCINE FOR TD (TETANUS-DIPHTHERIA): ICD-10-CM

## 2019-10-24 DIAGNOSIS — E78.5 HYPERLIPIDEMIA LDL GOAL <100: ICD-10-CM

## 2019-10-24 DIAGNOSIS — E11.9 TYPE 2 DIABETES MELLITUS WITHOUT COMPLICATION, WITHOUT LONG-TERM CURRENT USE OF INSULIN (H): Primary | ICD-10-CM

## 2019-10-24 LAB
ALT SERPL W P-5'-P-CCNC: 29 U/L (ref 0–70)
CHOLEST SERPL-MCNC: 184 MG/DL
CREAT SERPL-MCNC: 0.82 MG/DL (ref 0.66–1.25)
CREAT UR-MCNC: 176 MG/DL
GFR SERPL CREATININE-BSD FRML MDRD: >90 ML/MIN/{1.73_M2}
HBA1C MFR BLD: 7.3 % (ref 0–5.6)
HDLC SERPL-MCNC: 81 MG/DL
LDLC SERPL CALC-MCNC: 92 MG/DL
MICROALBUMIN UR-MCNC: 9 MG/L
MICROALBUMIN/CREAT UR: 5.07 MG/G CR (ref 0–17)
NONHDLC SERPL-MCNC: 103 MG/DL
POTASSIUM SERPL-SCNC: 3.7 MMOL/L (ref 3.4–5.3)
TRIGL SERPL-MCNC: 53 MG/DL

## 2019-10-24 PROCEDURE — 82565 ASSAY OF CREATININE: CPT | Performed by: FAMILY MEDICINE

## 2019-10-24 PROCEDURE — 90472 IMMUNIZATION ADMIN EACH ADD: CPT | Performed by: FAMILY MEDICINE

## 2019-10-24 PROCEDURE — 90471 IMMUNIZATION ADMIN: CPT | Performed by: FAMILY MEDICINE

## 2019-10-24 PROCEDURE — 82043 UR ALBUMIN QUANTITATIVE: CPT | Performed by: FAMILY MEDICINE

## 2019-10-24 PROCEDURE — 99214 OFFICE O/P EST MOD 30 MIN: CPT | Mod: 25 | Performed by: FAMILY MEDICINE

## 2019-10-24 PROCEDURE — 90714 TD VACC NO PRESV 7 YRS+ IM: CPT | Performed by: FAMILY MEDICINE

## 2019-10-24 PROCEDURE — 90682 RIV4 VACC RECOMBINANT DNA IM: CPT | Performed by: FAMILY MEDICINE

## 2019-10-24 PROCEDURE — 83036 HEMOGLOBIN GLYCOSYLATED A1C: CPT | Performed by: FAMILY MEDICINE

## 2019-10-24 PROCEDURE — 36415 COLL VENOUS BLD VENIPUNCTURE: CPT | Performed by: FAMILY MEDICINE

## 2019-10-24 PROCEDURE — 84132 ASSAY OF SERUM POTASSIUM: CPT | Performed by: FAMILY MEDICINE

## 2019-10-24 PROCEDURE — 84460 ALANINE AMINO (ALT) (SGPT): CPT | Performed by: FAMILY MEDICINE

## 2019-10-24 PROCEDURE — 80061 LIPID PANEL: CPT | Performed by: FAMILY MEDICINE

## 2019-10-24 RX ORDER — METFORMIN HCL 500 MG
1000 TABLET, EXTENDED RELEASE 24 HR ORAL 2 TIMES DAILY WITH MEALS
Qty: 360 TABLET | Refills: 1 | Status: SHIPPED | OUTPATIENT
Start: 2019-10-24 | End: 2020-04-07

## 2019-10-24 RX ORDER — AMLODIPINE AND BENAZEPRIL HYDROCHLORIDE 5; 20 MG/1; MG/1
1 CAPSULE ORAL DAILY
Qty: 90 CAPSULE | Refills: 1 | Status: SHIPPED | OUTPATIENT
Start: 2019-10-24 | End: 2020-04-07

## 2019-10-24 RX ORDER — NIACIN 500 MG/1
2000 TABLET, EXTENDED RELEASE ORAL AT BEDTIME
Qty: 360 TABLET | Refills: 1 | Status: SHIPPED | OUTPATIENT
Start: 2019-10-24 | End: 2020-04-07

## 2019-10-24 ASSESSMENT — PAIN SCALES - GENERAL: PAINLEVEL: NO PAIN (0)

## 2019-10-24 ASSESSMENT — MIFFLIN-ST. JEOR: SCORE: 1916.27

## 2019-10-24 NOTE — NURSING NOTE
Prior to immunization administration, verified patients identity using patient s name and date of birth. Please see Immunization Activity for additional information.     Screening Questionnaire for Adult Immunization    Are you sick today?   No   Do you have allergies to medications, food, a vaccine component or latex?   No   Have you ever had a serious reaction after receiving a vaccination?   No   Do you have a long-term health problem with heart disease, lung disease, asthma, kidney disease, metabolic disease (e.g. diabetes), anemia, or other blood disorder?   No   Do you have cancer, leukemia, HIV/AIDS, or any other immune system problem?   No   In the past 3 months, have you taken medications that affect  your immune system, such as prednisone, other steroids, or anticancer drugs; drugs for the treatment of rheumatoid arthritis, Crohn s disease, or psoriasis; or have you had radiation treatments?   No   Have you had a seizure, or a brain or other nervous system problem?   No   During the past year, have you received a transfusion of blood or blood     products, or been given immune (gamma) globulin or antiviral drug?   No   For women: Are you pregnant or is there a chance you could become        pregnant during the next month?   No   Have you received any vaccinations in the past 4 weeks?   No     Immunization questionnaire answers were all negative.        Per orders of Dr. Humphrey, injection of TD and Flublok given by Gabriella Gandhi MA. Patient instructed to remain in clinic for 15 minutes afterwards, and to report any adverse reaction to me immediately.       Screening performed by Gabriella Gandhi MA on 10/24/2019 at 2:21 PM.

## 2019-10-24 NOTE — PATIENT INSTRUCTIONS
At [facility], we strive to deliver an exceptional experience to you, every time we see you.  If you receive a survey in the mail, please send us back your thoughts. We really do value your feedback.    Based on your medical history, these are the current health maintenance/preventive care services that you are due for (some may have been done at this visit.)  Health Maintenance Due   Topic Date Due     PREVENTIVE CARE VISIT  10/12/2018     ADVANCE CARE PLANNING  11/05/2018     DIABETIC FOOT EXAM  03/29/2019     INFLUENZA VACCINE (1) 09/01/2019     A1C  09/15/2019         Suggested websites for health information:  Www.[place].org : Up to date and easily searchable information on multiple topics.  Www.medlineplus.gov : medication info, interactive tutorials, watch real surgeries online  Www.familydoctor.org : good info from the Academy of Family Physicians  Www.cdc.gov : public health info, travel advisories, epidemics (H1N1)  Www.aap.org : children's health info, normal development, vaccinations  Www.health.[facility].[place].us : [place] dept of health, public health issues in [place], N1N1    Your care team:                            Family Medicine Internal Medicine   MD Mendoza Gates MD Shantel Branch-Fleming, MD Katya Georgiev PA-C Nam Ho, MD Pediatrics   RAMONITA Peterson, MYRON Mcintosh APRMICHELLE CNP   MD Lori Hawkins MD Deborah Mielke, MD Kim Thein, APRN [facility]      Clinic hours: Monday - Thursday 7 am-7 pm; Fridays 7 am-5 pm.   Urgent care: Monday - Friday 11 am-9 pm; Saturday and Sunday 9 am-5 pm.  Pharmacy : Monday -Thursday 8 am-8 pm; Friday 8 am-6 pm; Saturday and Sunday 9 am-5 pm.     Clinic: [phone]   Pharmacy: [phone]

## 2019-10-24 NOTE — TELEPHONE ENCOUNTER
"Requested Prescriptions   Pending Prescriptions Disp Refills     JANUVIA 100 MG tablet [Pharmacy Med Name: JANUVIA 100 MG TABLET]  Last Written Prescription Date:  09/25/19  Last Fill Quantity: 30,  # refills: 0   Last Office Visit with G, P or [facility] prescribing provider:  05/07/19-Maria Luz   Future Office Visit:    Next 5 appointments (look out 90 days)    Oct 24, 2019  1:20 PM CDT  Office Visit with Humberto Humphrey MD  [facility] [address] [place] [id number]  [phone]        30 tablet 0     Sig: TAKE 1 TABLET (100 MG) BY MOUTH DAILY FOR DIABETES       DPP4 Inhibitors Protocol Failed - 10/24/2019  2:19 AM        Failed - HgbA1C in past 3 or 6 months     If HgbA1C is 8 or greater, it needs to be on file within the past 3 months.  If less than 8, must be on file within the past 6 months.     Recent Labs   Lab Test 03/15/19  1049   A1C 7.5*             Passed - Blood pressure less than 140/90 in past 6 months     BP Readings from Last 3 Encounters:   05/07/19 130/79   03/15/19 135/76   12/30/18 144/77                 Passed - LDL on file in past 12 months     Recent Labs   Lab Test 03/15/19  1049   LDL 96             Passed - Microalbumin on file in past 12 months     Recent Labs   Lab Test 11/29/18  1001   MICROL 10   UMALCR 3.97             Passed - Medication is active on med list        Passed - Patient is age 18 or older        Passed - Normal serum creatinine in past 12 months     Recent Labs   Lab Test 03/15/19  1049   CR 0.97             Passed - Recent (6 mo) or future (30 days) visit within the authorizing provider's specialty     Patient had office visit in the last 6 months or has a visit in the next 30 days with authorizing provider.  See \"Patient Info\" tab in inbasket, or \"Choose Columns\" in Meds & Orders section of the refill encounter.              "

## 2019-10-24 NOTE — PROGRESS NOTES
Subjective     Ren Farrar is a 59 year old male who presents to clinic today for the following health issues:    HPI   Diabetes Follow-up      How often are you checking your blood sugar? Two times daily    What time of day are you checking your blood sugars (select all that apply)?  Before and after meals    Have you had any blood sugars above 200?  Yes after meals    Have you had any blood sugars below 70?  No    What symptoms do you notice when your blood sugar is low?  None    What concerns do you have today about your diabetes? None     Do you have any of these symptoms? (Select all that apply)  No numbness or tingling in feet.  No redness, sores or blisters on feet.  No complaints of excessive thirst.  No reports of blurry vision.  No significant changes to weight.     Have you had a diabetic eye exam in the last 12 months? Yes- Date of last eye exam: within one year    His appetite hasn't decreased with januvia. He thought that his HgbA1c would be a lot better because he assumed that it would decrease his appetite. But today when he came in, he didn't think it had actually improved all that much.     Diabetes Management Resources    Hyperlipidemia Follow-Up      Are you having any of the following symptoms? (Select all that apply)  No complaints of shortness of breath, chest pain or pressure.  No increased sweating or nausea with activity.  No left-sided neck or arm pain.  No complaints of pain in calves when walking 1-2 blocks.    Are you regularly taking any medication or supplement to lower your cholesterol?   No    Are you having muscle aches or other side effects that you think could be caused by your cholesterol lowering medication?  No    Hypertension Follow-up      Do you check your blood pressure regularly outside of the clinic? No     Are you following a low salt diet? Yes    Are your blood pressures ever more than 140 on the top number (systolic) OR more   than 90 on the bottom number  "(diastolic), for example 140/90? No     He had a problem with leg swelling and was given hydrochlorothiazide and compression stockings. He no longer takes it because he doesn't feel like he needs it, as the leg swelling problem has resolved.     BP Readings from Last 2 Encounters:   10/24/19 132/80   05/07/19 130/79     Hemoglobin A1C (%)   Date Value   10/24/2019 7.3 (H)   03/15/2019 7.5 (H)     LDL Cholesterol Calculated (mg/dL)   Date Value   03/15/2019 96   11/29/2018 89         How many servings of fruits and vegetables do you eat daily?  2-3    On average, how many sweetened beverages do you drink each day (soda, juice, sweet tea, etc)?   0    How many days per week do you miss taking your medication? 0    Past medical, family, and social histories, medications, and allergies are reviewed and updated in [facility].    Review of Systems   ROS COMP: Constitutional, HEENT, cardiovascular, pulmonary, gi and gu systems are negative, except as otherwise noted.      This document serves as a record of the services and decisions personally performed and made by Dr. Humphrey. It was created on his behalf by Diana Farris, a trained medical scribe. The creation of this document is based the provider's statements to the medical scribe.  Diana Farris,  1:50 PM     Objective    /80 (BP Location: Left arm, Patient Position: Sitting, Cuff Size: Adult Large)   Pulse 90   Temp 97.8  F (36.6  C) (Oral)   Resp 16   Ht 1.826 m (5' 11.89\")   Wt 107.5 kg (237 lb)   SpO2 98%   BMI 32.24 kg/m    Body mass index is 32.24 kg/m .     Physical Exam   GENERAL: healthy, alert and no distress  EYES: Eyes grossly normal to inspection, PERRL, EOMI, sclerae white and conjunctivae normal  MS: no gross musculoskeletal defects noted, no edema  SKIN: no suspicious lesions or rashes to visible skin  NEURO: Normal strength and tone, sensory exam grossly normal, mentation intact, oriented times 3 and cranial nerves 2-12 intact  PSYCH: " "mentation appears normal, affect normal/bright    Diagnostic Test Results:  Results for orders placed or performed in visit on 10/24/19 (from the past 24 hour(s))   Hemoglobin A1c   Result Value Ref Range    Hemoglobin A1C 7.3 (H) 0 - 5.6 %           Assessment & Plan     (E11.9) Type 2 diabetes mellitus without complication, without long-term current use of insulin (H)  (primary encounter diagnosis)  Comment: HgbA1c mildly improved from last visit but still above the goal we have set for him and not as improved as he anticipated at last visit  Plan: Hemoglobin A1c, Albumin Random Urine         Quantitative with Creat Ratio, sitagliptin         (JANUVIA) 100 MG tablet, metFORMIN         (GLUCOPHAGE-XR) 500 MG 24 hr tablet        Return in about 6 months (around 4/13/2020) for full physical, diabetes.      (E78.5) Hyperlipidemia LDL goal <100  Comment: fasting  Plan: Lipid panel reflex to direct LDL Non-fasting,         ALT, niacin ER (NIASPAN) 500 MG CR tablet        Recheck in 6 months     (I10) Essential hypertension with goal blood pressure less than 140/90  Comment: well-controlled   Plan: Potassium, Creatinine, amLODIPine-benazepril         (LOTREL) 5-20 MG capsule        Recheck in 6 months     (Z23) Need for influenza vaccination  Comment: Influenza vaccine offered and accepted by patient. He has received it before without problems.  Plan: C RIV4 (FLUBLOK) VACCINE RECOMBINANT DNA PRSRV         ANTIBIO FREE, IM, VACCINE ADMINISTRATION,         INITIAL            (Z23) Need for vaccine for Td (tetanus-diphtheria)  Comment:   Plan: C TD PRSERV FREE >=7 YRS ADS IM, VACCINE         ADMINISTRATION, EACH ADDITIONAL               BMI:   Estimated body mass index is 32.24 kg/m  as calculated from the following:    Height as of this encounter: 1.826 m (5' 11.89\").    Weight as of this encounter: 107.5 kg (237 lb).   Weight management plan: Discussed healthy diet and exercise guidelines. I advised him not to depend on " the sitagliptin for weight loss since it doesn't seem to be curbing his appetite that much anyway.      The information in this document, created by the medical scribe for me, accurately reflects the services I personally performed and the decisions made by me. I have reviewed and approved this document for accuracy prior to leaving the patient care area.  Humberto Humphrey MD

## 2019-10-28 RX ORDER — SITAGLIPTIN 100 MG/1
TABLET, FILM COATED ORAL
Qty: 30 TABLET | Refills: 0 | OUTPATIENT
Start: 2019-10-28

## 2020-01-08 DIAGNOSIS — R60.9 DEPENDENT EDEMA: ICD-10-CM

## 2020-01-09 NOTE — TELEPHONE ENCOUNTER
Routing refill request to provider for review/approval because:  Drug not active on patient's medication list    Shona Guzman RN

## 2020-01-13 RX ORDER — HYDROCHLOROTHIAZIDE 12.5 MG/1
25 TABLET ORAL DAILY
Qty: 180 TABLET | Refills: 0 | Status: SHIPPED | OUTPATIENT
Start: 2020-01-13 | End: 2020-04-07

## 2020-02-24 ENCOUNTER — HEALTH MAINTENANCE LETTER (OUTPATIENT)
Age: 60
End: 2020-02-24

## 2020-02-24 ENCOUNTER — OFFICE VISIT (OUTPATIENT)
Dept: NURSING | Facility: CLINIC | Age: 60
End: 2020-02-24
Payer: COMMERCIAL

## 2020-02-24 DIAGNOSIS — Z23 NEED FOR VACCINATION: Primary | ICD-10-CM

## 2020-02-24 PROCEDURE — 86580 TB INTRADERMAL TEST: CPT

## 2020-02-24 PROCEDURE — 99207 ZZC NO CHARGE NURSE ONLY: CPT

## 2020-02-24 NOTE — PROGRESS NOTES
The patient is asked the following questions today and these are his answers:    -Have you had a mantoux administered in the past 30 days?    No  -Have you had a previous positive Mantoux.  Yes  -Have you received BCG in the past.NO   -Have you had a live vaccine  (MMR, Varicella, OPV, Yellow Fever) in the last 6 weeks.  No  -Have you had and active  viral or bacterial infection in the past 6 weeks.  No  -Have you received corticosteroids or immunosuppressive agents in the past 6 weeks.  No  -Have you been diagnosed with HIV?  No  -Do you have a malignancy?  No    Mantoux Questionnaire: answers were all negative.      Robyn Whalen MA on 2/24/2020 at 11:31 AM

## 2020-02-26 ENCOUNTER — OFFICE VISIT (OUTPATIENT)
Dept: FAMILY MEDICINE | Facility: CLINIC | Age: 60
End: 2020-02-26
Payer: COMMERCIAL

## 2020-02-26 ENCOUNTER — ALLIED HEALTH/NURSE VISIT (OUTPATIENT)
Dept: NURSING | Facility: CLINIC | Age: 60
End: 2020-02-26
Payer: COMMERCIAL

## 2020-02-26 VITALS
WEIGHT: 240 LBS | BODY MASS INDEX: 32.65 KG/M2 | DIASTOLIC BLOOD PRESSURE: 85 MMHG | HEART RATE: 91 BPM | SYSTOLIC BLOOD PRESSURE: 131 MMHG | OXYGEN SATURATION: 98 %

## 2020-02-26 DIAGNOSIS — Z11.1 SCREENING EXAMINATION FOR PULMONARY TUBERCULOSIS: Primary | ICD-10-CM

## 2020-02-26 LAB
PPDINDURATION: 11 MM (ref 0–5)
PPDREDNESS: 11 MM

## 2020-02-26 PROCEDURE — 99213 OFFICE O/P EST LOW 20 MIN: CPT | Performed by: FAMILY MEDICINE

## 2020-02-26 PROCEDURE — 36415 COLL VENOUS BLD VENIPUNCTURE: CPT | Performed by: FAMILY MEDICINE

## 2020-02-26 PROCEDURE — 86481 TB AG RESPONSE T-CELL SUSP: CPT | Performed by: FAMILY MEDICINE

## 2020-02-26 NOTE — PROGRESS NOTES
Subjective     Ren Farrar is a 59 year old male who presents to clinic today for the following health issues:    HPI     Patient has h/o BCG vaccine and recently positive mantoux test. Patient denies fever, cough, night sweats, myalgia, arthralgia. Denies any history of exposure to tuberculosis.    Patient Active Problem List   Diagnosis     DARIA (obstructive sleep apnea)     Uncontrolled type 2 diabetes mellitus (H)     Hyperlipidemia LDL goal <100     Gastroesophageal reflux disease without esophagitis     Erectile dysfunction     Essential hypertension with goal blood pressure less than 140/90     Pain in joint, shoulder region     Obesity, Class I, BMI 30-34.9     Osteoarthritis of left hip     Nuclear sclerotic cataract of both eyes     Advanced directives, counseling/discussion     Adhesive capsulitis     Shoulder impingement     OAG (open angle glaucoma)     Anemia     Past Surgical History:   Procedure Laterality Date     NO HISTORY OF SURGERY       SELECTIVE LASER TRABECULOPLASTY (SLT) OD (RIGHT EYE) Right 2018    SLT #1 OD (inf 180)       Social History     Tobacco Use     Smoking status: Never Smoker     Smokeless tobacco: Never Used   Substance Use Topics     Alcohol use: Yes     Comment: occassioanally     Family History   Problem Relation Age of Onset     C.A.D. Mother      Myocardial Infarction Mother      Hypertension Mother      Diabetes Mother         borderline     Eye Disorder Mother         cataract     Kidney Disease Mother         dialysis     Diabetes Father      Hypertension Father      Heart Failure Father          CHF 81     Diabetes Sister      Thyroid Disease Sister      Depression Son 17     Asthma No family hx of      Cerebrovascular Disease No family hx of      Breast Cancer No family hx of      Cancer - colorectal No family hx of      Prostate Cancer No family hx of      Allergies No family hx of      Cancer No family hx of      Glaucoma No family hx of      Macular  Degeneration No family hx of          Current Outpatient Medications   Medication Sig Dispense Refill     alcohol swab prep pads Use to swab area of injection/yanci as directed. 100 each 3     amLODIPine-benazepril (LOTREL) 5-20 MG capsule Take 1 capsule by mouth daily for blood pressure. 90 capsule 1     ASPIRIN 81 MG PO TABS 1 TABLET DAILY*  prn     blood glucose (NO BRAND SPECIFIED) test strip Use to test blood sugar 4 times daily or as directed. To accompany: Blood Glucose Monitor Brands: per insurance. 100 strip 6     blood glucose calibration (NO BRAND SPECIFIED) solution To accompany: Blood Glucose Monitor Brands: per insurance. 1 Bottle 3     blood glucose monitoring (NO BRAND SPECIFIED) meter device kit Use to test blood sugar 4 times daily or as directed. Preferred blood glucose meter/supplies to accompany: Monitor Brands: per insurance. 1 kit 0     diclofenac (VOLTAREN) 50 MG EC tablet Take 1 tablet (50 mg) by mouth 3 times daily as needed for moderate pain (toe) . Take with food. 60 tablet 1     hydrochlorothiazide (HYDRODIURIL) 12.5 MG tablet Take 2 tablets (25 mg) by mouth daily for leg swelling (and blood pressure). 180 tablet 0     latanoprost (XALATAN) 0.005 % ophthalmic solution Place 1 drop into both eyes At Bedtime 1 Bottle 12     metFORMIN (GLUCOPHAGE-XR) 500 MG 24 hr tablet Take 2 tablets (1,000 mg) by mouth 2 times daily (with meals) for diabetes. 360 tablet 1     naproxen (NAPROSYN) 500 MG tablet Take 1 tablet (500 mg) by mouth 2 times daily (with meals) 30 tablet 1     niacin ER (NIASPAN) 500 MG CR tablet Take 4 tablets (2,000 mg) by mouth At Bedtime for cholesterol. Take with low-fat snack. 360 tablet 1     sitagliptin (JANUVIA) 100 MG tablet Take 1 tablet (100 mg) by mouth daily for diabetes. 90 tablet 1     STATIN NOT PRESCRIBED, INTENTIONAL, Causes muscle aches       thin (NO BRAND SPECIFIED) lancets Use with lanceting device. To accompany: Blood Glucose Monitor Brands: per insurance.  200 each 6     Allergies   Allergen Reactions     Atorvastatin Calcium      Myalgias, increased CPK     Simvastatin      myalgias     BP Readings from Last 3 Encounters:   02/26/20 131/85   10/24/19 132/80   05/07/19 130/79    Wt Readings from Last 3 Encounters:   02/26/20 108.9 kg (240 lb)   10/24/19 107.5 kg (237 lb)   05/07/19 108.6 kg (239 lb 8 oz)            Reviewed and updated as needed this visit by Provider         Review of Systems   ROS COMP: Constitutional, HEENT, cardiovascular, pulmonary, GI, , musculoskeletal, neuro, skin, endocrine and psych systems are negative, except as otherwise noted.      Objective    /85 (BP Location: Left arm, Patient Position: Chair, Cuff Size: Adult Large)   Pulse 91   Wt 108.9 kg (240 lb)   SpO2 98%   BMI 32.65 kg/m    Body mass index is 32.65 kg/m .  Physical Exam   GENERAL: healthy, alert and no distress  NECK: no adenopathy, no asymmetry, masses, or scars and thyroid normal to palpation  RESP: lungs clear to auscultation - no rales, rhonchi or wheezes  CV: regular rate and rhythm, normal S1 S2, no S3 or S4, no murmur, click or rub, no peripheral edema and peripheral pulses strong  ABDOMEN: soft, nontender, no hepatosplenomegaly, no masses and bowel sounds normal  MS: no gross musculoskeletal defects noted, no edema    Diagnostic Test Results:  Labs reviewed in Epic        Assessment & Plan     1. Screening examination for pulmonary tuberculosis  Will await quantiferon results.  - Quantiferon TB Gold Plus       See Patient Instructions    Return in about 4 weeks (around 3/25/2020) for Diabetes.    Mitesh Brown MD, MD  [facility]

## 2020-02-26 NOTE — NURSING NOTE
Mantoux results:11 mm induration.  No swelling.  11 mm redness.  Concepcion Odom RN, M [facility] Triage    Huddle with Dr Brown about the 11 mm reading and he is adding the patient onto his schedule so that blood work can be run.    Concepcion Odom RN, M [facility] Triage

## 2020-02-28 LAB
GAMMA INTERFERON BACKGROUND BLD IA-ACNC: 0.44 IU/ML
M TB IFN-G BLD-IMP: NEGATIVE
M TB IFN-G CD4+ BCKGRND COR BLD-ACNC: 8.17 IU/ML
MITOGEN IGNF BCKGRD COR BLD-ACNC: 0 IU/ML
MITOGEN IGNF BCKGRD COR BLD-ACNC: 0 IU/ML

## 2020-03-30 DIAGNOSIS — I10 ESSENTIAL HYPERTENSION WITH GOAL BLOOD PRESSURE LESS THAN 140/90: ICD-10-CM

## 2020-03-30 DIAGNOSIS — E11.9 TYPE 2 DIABETES MELLITUS WITHOUT COMPLICATION, WITHOUT LONG-TERM CURRENT USE OF INSULIN (H): Primary | ICD-10-CM

## 2020-03-30 DIAGNOSIS — E78.5 HYPERLIPIDEMIA LDL GOAL <100: ICD-10-CM

## 2020-04-02 DIAGNOSIS — E11.9 TYPE 2 DIABETES MELLITUS WITHOUT COMPLICATION, WITHOUT LONG-TERM CURRENT USE OF INSULIN (H): ICD-10-CM

## 2020-04-02 DIAGNOSIS — I10 ESSENTIAL HYPERTENSION WITH GOAL BLOOD PRESSURE LESS THAN 140/90: ICD-10-CM

## 2020-04-02 DIAGNOSIS — E78.5 HYPERLIPIDEMIA LDL GOAL <100: ICD-10-CM

## 2020-04-02 LAB
ALT SERPL W P-5'-P-CCNC: 22 U/L (ref 0–70)
ANION GAP SERPL CALCULATED.3IONS-SCNC: 4 MMOL/L (ref 3–14)
BUN SERPL-MCNC: 17 MG/DL (ref 7–30)
CALCIUM SERPL-MCNC: 8.7 MG/DL (ref 8.5–10.1)
CHLORIDE SERPL-SCNC: 109 MMOL/L (ref 94–109)
CHOLEST SERPL-MCNC: 166 MG/DL
CO2 SERPL-SCNC: 27 MMOL/L (ref 20–32)
CREAT SERPL-MCNC: 0.89 MG/DL (ref 0.66–1.25)
GFR SERPL CREATININE-BSD FRML MDRD: >90 ML/MIN/{1.73_M2}
GLUCOSE SERPL-MCNC: 146 MG/DL (ref 70–99)
HBA1C MFR BLD: 8 % (ref 0–5.6)
HDLC SERPL-MCNC: 72 MG/DL
LDLC SERPL CALC-MCNC: 82 MG/DL
NONHDLC SERPL-MCNC: 94 MG/DL
POTASSIUM SERPL-SCNC: 3.9 MMOL/L (ref 3.4–5.3)
SODIUM SERPL-SCNC: 140 MMOL/L (ref 133–144)
TRIGL SERPL-MCNC: 61 MG/DL

## 2020-04-02 PROCEDURE — 80048 BASIC METABOLIC PNL TOTAL CA: CPT | Performed by: FAMILY MEDICINE

## 2020-04-02 PROCEDURE — 80061 LIPID PANEL: CPT | Performed by: FAMILY MEDICINE

## 2020-04-02 PROCEDURE — 84460 ALANINE AMINO (ALT) (SGPT): CPT | Performed by: FAMILY MEDICINE

## 2020-04-02 PROCEDURE — 83036 HEMOGLOBIN GLYCOSYLATED A1C: CPT | Performed by: FAMILY MEDICINE

## 2020-04-02 PROCEDURE — 36415 COLL VENOUS BLD VENIPUNCTURE: CPT | Performed by: FAMILY MEDICINE

## 2020-04-07 ENCOUNTER — OFFICE VISIT (OUTPATIENT)
Dept: FAMILY MEDICINE | Facility: CLINIC | Age: 60
End: 2020-04-07
Payer: COMMERCIAL

## 2020-04-07 VITALS
TEMPERATURE: 98.6 F | DIASTOLIC BLOOD PRESSURE: 85 MMHG | HEART RATE: 71 BPM | HEIGHT: 72 IN | SYSTOLIC BLOOD PRESSURE: 131 MMHG | WEIGHT: 239.1 LBS | BODY MASS INDEX: 32.38 KG/M2 | OXYGEN SATURATION: 98 % | RESPIRATION RATE: 18 BRPM

## 2020-04-07 DIAGNOSIS — E78.5 HYPERLIPIDEMIA LDL GOAL <100: ICD-10-CM

## 2020-04-07 DIAGNOSIS — G47.33 OSA (OBSTRUCTIVE SLEEP APNEA): ICD-10-CM

## 2020-04-07 DIAGNOSIS — I10 ESSENTIAL HYPERTENSION WITH GOAL BLOOD PRESSURE LESS THAN 140/90: ICD-10-CM

## 2020-04-07 DIAGNOSIS — R60.9 DEPENDENT EDEMA: ICD-10-CM

## 2020-04-07 DIAGNOSIS — E11.9 TYPE 2 DIABETES MELLITUS WITHOUT COMPLICATION, WITHOUT LONG-TERM CURRENT USE OF INSULIN (H): ICD-10-CM

## 2020-04-07 DIAGNOSIS — N52.1 ERECTILE DYSFUNCTION DUE TO DISEASES CLASSIFIED ELSEWHERE: ICD-10-CM

## 2020-04-07 DIAGNOSIS — Z00.00 ROUTINE GENERAL MEDICAL EXAMINATION AT A HEALTH CARE FACILITY: Primary | ICD-10-CM

## 2020-04-07 PROCEDURE — 99214 OFFICE O/P EST MOD 30 MIN: CPT | Mod: 25 | Performed by: FAMILY MEDICINE

## 2020-04-07 PROCEDURE — 99396 PREV VISIT EST AGE 40-64: CPT | Performed by: FAMILY MEDICINE

## 2020-04-07 RX ORDER — AMLODIPINE AND BENAZEPRIL HYDROCHLORIDE 5; 20 MG/1; MG/1
1 CAPSULE ORAL DAILY
Qty: 90 CAPSULE | Refills: 1 | Status: SHIPPED | OUTPATIENT
Start: 2020-04-07 | End: 2020-11-12

## 2020-04-07 RX ORDER — METFORMIN HCL 500 MG
2000 TABLET, EXTENDED RELEASE 24 HR ORAL
Qty: 360 TABLET | Refills: 1 | Status: SHIPPED | OUTPATIENT
Start: 2020-04-07 | End: 2020-10-20

## 2020-04-07 RX ORDER — HYDROCHLOROTHIAZIDE 12.5 MG/1
25 TABLET ORAL DAILY
Qty: 180 TABLET | Refills: 1 | Status: SHIPPED | OUTPATIENT
Start: 2020-04-07 | End: 2021-01-25

## 2020-04-07 RX ORDER — SILDENAFIL 100 MG/1
50-100 TABLET, FILM COATED ORAL DAILY PRN
Qty: 30 TABLET | Refills: 3 | Status: SHIPPED | OUTPATIENT
Start: 2020-04-07 | End: 2021-05-11

## 2020-04-07 RX ORDER — NIACIN 500 MG/1
2000 TABLET, EXTENDED RELEASE ORAL AT BEDTIME
Qty: 360 TABLET | Refills: 1 | Status: SHIPPED | OUTPATIENT
Start: 2020-04-07 | End: 2021-01-25

## 2020-04-07 ASSESSMENT — PAIN SCALES - GENERAL: PAINLEVEL: NO PAIN (0)

## 2020-04-07 ASSESSMENT — MIFFLIN-ST. JEOR: SCORE: 1919.61

## 2020-04-07 NOTE — PROGRESS NOTES
3  SUBJECTIVE:   CC: Ren Farrar is an 59 year old male who presents for preventive health visit and diabetes update.     Healthy Habits:    Do you get at least three servings of calcium containing foods daily (dairy, green leafy vegetables, etc.)? yes    Amount of exercise or daily activities, outside of work: 1 day(s) per week    Problems taking medications regularly No    Medication side effects: No    Have you had an eye exam in the past two years? yes    Do you see a dentist twice per year? yes    Do you have sleep apnea, excessive snoring or daytime drowsiness?sleep  apnea      Diabetes Follow-up    How often are you checking your blood sugar? One time daily  What time of day are you checking your blood sugars (select all that apply)?  Before and after meals  Have you had any blood sugars above 200?  Yes a few times  Have you had any blood sugars below 70?  No    What symptoms do you notice when your blood sugar is low?  Shaky    What concerns do you have today about your diabetes? Other: wish it was better controlled.  Had decreased his metformin dose to 1000mg/d for 2+ months to see if he could do with less. He is also interested in changing regimen to QD for everything     Do you have any of these symptoms? (Select all that apply)  No numbness or tingling in feet.  No redness, sores or blisters on feet.  No complaints of excessive thirst.  No reports of blurry vision.  No significant changes to weight.    Have you had a diabetic eye exam in the last 12 months? 02/2019      Hyperlipidemia Follow-Up      Are you regularly taking any medication or supplement to lower your cholesterol?   Yes- no side effects    Are you having muscle aches or other side effects that you think could be caused by your cholesterol lowering medication?  No    Hypertension Follow-up      Do you check your blood pressure regularly outside of the clinic? Yes     Are you following a low salt diet? No    Are your blood pressures ever  more than 140 on the top number (systolic) OR more   than 90 on the bottom number (diastolic), for example 140/90? No    BP Readings from Last 2 Encounters:   04/07/20 131/85   02/26/20 131/85     Hemoglobin A1C (%)   Date Value   04/02/2020 8.0 (H)   10/24/2019 7.3 (H)     LDL Cholesterol Calculated (mg/dL)   Date Value   04/02/2020 82   10/24/2019 92         Today's PHQ-2 Score:   PHQ-2 ( 1999 [employer]) 4/7/2020 5/7/2019   Q1: Little interest or pleasure in doing things 0 0   Q2: Feeling down, depressed or hopeless 0 0   PHQ-2 Score 0 0       Abuse: Current or Past(Physical, Sexual or Emotional)- No  Do you feel safe in your environment? Yes    Have you ever done Advance Care Planning? (For example, a Health Directive, POLST, or a discussion with a medical provider or your loved ones about your wishes): Yes, patient states has an Advance Care Planning document and will bring a copy to the clinic.    Social History     Tobacco Use     Smoking status: Never Smoker     Smokeless tobacco: Never Used   Substance Use Topics     Alcohol use: Yes     Comment: occassioanally     If you drink alcohol do you typically have >3 drinks per day or >7 drinks per week? No                      Last PSA:   PSA   Date Value Ref Range Status   11/01/2011 0.63 0 - 4 ug/L Final       Past medical, family, and social histories, medications, and allergies are reviewed and updated in Epic.     ROS:  CONSTITUTIONAL: NEGATIVE for fever, chills, change in weight  INTEGUMENTARY/SKIN: NEGATIVE for worrisome rashes, moles or lesions  EYES: NEGATIVE for vision changes or irritation  ENT: NEGATIVE for ear, mouth and throat problems  RESP: NEGATIVE for significant cough or SOB  CV: NEGATIVE for chest pain, palpitations or peripheral edema  GI: NEGATIVE for nausea, abdominal pain, heartburn, or change in bowel habits   male: erectile dysfunction  MUSCULOSKELETAL: NEGATIVE for significant arthralgias or myalgia  NEURO: NEGATIVE for weakness,  "dizziness or paresthesias  PSYCHIATRIC: NEGATIVE for changes in mood or affect    OBJECTIVE:   /85 (BP Location: Right arm, Patient Position: Sitting, Cuff Size: Adult Large)   Pulse 71   Temp 98.6  F (37  C) (Oral)   Resp 18   Ht 1.816 m (5' 11.5\")   Wt 108.5 kg (239 lb 1.6 oz)   SpO2 98%   BMI 32.88 kg/m    EXAM:  GENERAL: healthy, alert and no distress  EYES: Eyes grossly normal to inspection, PERRL and conjunctivae and sclerae normal  HENT: ear canals and TM's normal, nose and mouth without ulcers or lesions  NECK: no adenopathy, no asymmetry, masses, or scars and thyroid normal to palpation  RESP: lungs clear to auscultation - no rales, rhonchi or wheezes  CV: regular rate and rhythm, normal S1 S2, no S3 or S4, no murmur, click or rub, no peripheral edema and peripheral pulses strong  ABDOMEN: soft, nontender, no hepatosplenomegaly, no masses and bowel sounds normal   (male): normal male genitalia without lesions or urethral discharge, no hernia  MS: no gross musculoskeletal defects noted, no edema  SKIN: no suspicious lesions or rashes  NEURO: Normal strength and tone, mentation intact and speech normal, cranial nerves 2-12 intact, DTRs symmetrical, monofilament exam deferred (no filament)  PSYCH: mentation appears normal, affect normal/bright    Diagnostic Test Results:  Results for POLLY ABAD (MRN [id number]) as of 4/7/2020 11:07   Ref. Range 4/2/2020 11:08   Sodium Latest Ref Range: 133 - 144 mmol/L 140   Potassium Latest Ref Range: 3.4 - 5.3 mmol/L 3.9   Chloride Latest Ref Range: 94 - 109 mmol/L 109   Carbon Dioxide Latest Ref Range: 20 - 32 mmol/L 27   Urea Nitrogen Latest Ref Range: 7 - 30 mg/dL 17   Creatinine Latest Ref Range: 0.66 - 1.25 mg/dL 0.89   GFR Estimate Latest Ref Range: >60 mL/min/1.73_m2 >90   GFR Estimate If Black Latest Ref Range: >60 mL/min/1.73_m2 >90   Calcium Latest Ref Range: 8.5 - 10.1 mg/dL 8.7   Anion Gap Latest Ref Range: 3 - 14 mmol/L 4   ALT Latest Ref " Range: 0 - 70 U/L 22   Hemoglobin A1C Latest Ref Range: 0 - 5.6 % 8.0 (H)   Cholesterol Latest Ref Range: <200 mg/dL 166   HDL Cholesterol Latest Ref Range: >39 mg/dL 72   LDL Cholesterol Calculated Latest Ref Range: <100 mg/dL 82   Non HDL Cholesterol Latest Ref Range: <130 mg/dL 94   Triglycerides Latest Ref Range: <150 mg/dL 61   Glucose Latest Ref Range: 70 - 99 mg/dL 146 (H)     Lab Results   Component Value Date    A1C 8.0 04/02/2020    A1C 7.3 10/24/2019    A1C 7.5 03/15/2019    A1C 8.6 11/29/2018    A1C 8.3 03/29/2018          ASSESSMENT/PLAN:   (Z00.00) Routine general medical examination at a health care facility  (primary encounter diagnosis)  Comment: Negative screening exam; up-to-date on preventive services.   Plan: f/u 1 year    (E11.9) Type 2 diabetes mellitus without complication, without long-term current use of insulin (H)  Comment: uncontrolled, mainly d/t self-decrease in metformin dose  Plan: metFORMIN (GLUCOPHAGE-XR) 500 MG 24 hr tablet,         sitagliptin (JANUVIA) 100 MG tablet, Hemoglobin        A1c        Increase metformin back to 2000 mg/day.  Recheck hemoglobin A1c in 2.5 months.    (E78.5) Hyperlipidemia LDL goal <100  Comment: Well-controlled  Plan: niacin ER (NIASPAN) 500 MG CR tablet        Recheck in 6 months    (I10) Essential hypertension with goal blood pressure less than 140/90  Comment: Well-controlled  Plan: amLODIPine-benazepril (LOTREL) 5-20 MG capsule,        hydrochlorothiazide (HYDRODIURIL) 12.5 MG         tablet        Recheck in 6 months    (G47.33) DARIA (obstructive sleep apnea)  Comment: Zachary and updated/improved CPAP machine.  He thinks there is better technology out there.  His last sleep study was at the [facility], likely pre-2010  Plan: SLEEP EVALUATION & MANAGEMENT REFERRAL - ADULT         -[facility]          [phone] (Age 15 and up)          (R60.9) Dependent edema  Comment:   Plan: hydrochlorothiazide  "(HYDRODIURIL) 12.5 MG         tablet          (N52.1) Erectile dysfunction due to diseases classified elsewhere  Comment: Patient is interested in treating this with medication again.  Plan: sildenafil (VIAGRA) 100 MG tablet        He can take 50 mg or 100 mg.  He may request me to resend the prescription to a different pharmacy if he discovers a place that is cheaper ([facility])      COUNSELING:  Reviewed preventive health counseling, as reflected in patient instructions       Regular exercise       Vision screening    Estimated body mass index is 32.88 kg/m  as calculated from the following:    Height as of this encounter: 1.816 m (5' 11.5\").    Weight as of this encounter: 108.5 kg (239 lb 1.6 oz).    Weight management plan: Discussed healthy diet and exercise guidelines , as outlined in the AVS     reports that he has never smoked. He has never used smokeless tobacco.      Counseling Resources:  ATP IV Guidelines  Pooled Cohorts Equation Calculator  FRAX Risk Assessment  ICSI Preventive Guidelines  Dietary Guidelines for Americans, 2010  USDA's MyPlate  ASA Prophylaxis  Lung CA Screening    Humberto Humphrey MD  [facility]  "

## 2020-04-07 NOTE — PATIENT INSTRUCTIONS
Preventive Health Recommendations  Male Ages 50 - 64    Yearly exam:             See your health care provider every year in order to  o   Review health changes.   o   Discuss preventive care.    o   Review your medicines if your doctor has prescribed any.     Have a cholesterol test every 5 years, or more frequently if you are at risk for high cholesterol/heart disease.     Have a diabetes test (fasting glucose) every three years. If you are at risk for diabetes, you should have this test more often.     Have a colonoscopy at age 50, or have a yearly FIT test (stool test). These exams will check for colon cancer.      Talk with your health care provider about whether or not a prostate cancer screening test (PSA) is right for you.    You should be tested each year for STDs (sexually transmitted diseases), if you re at risk.     Shots: Get a flu shot each year. Get a tetanus shot every 10 years.     Nutrition:    Eat at least 5 servings of fruits and vegetables daily.     Eat whole-grain bread, whole-wheat pasta and brown rice instead of white grains and rice.     Get adequate Calcium and Vitamin D.     Lifestyle    Aerobic exercise for at least 150 minutes per week (40+ minutes per day, 4-6 days per week). This will help you control your weight and prevent disease.      Limit alcohol to one drink per day.     No smoking.     Wear sunscreen to prevent skin cancer.     See your dentist every six months for an exam and cleaning.     See your eye doctor every 1 to 2 years.

## 2020-05-04 DIAGNOSIS — H40.1131 PRIMARY OPEN ANGLE GLAUCOMA OF BOTH EYES, MILD STAGE: ICD-10-CM

## 2020-05-04 RX ORDER — LATANOPROST 50 UG/ML
1 SOLUTION/ DROPS OPHTHALMIC AT BEDTIME
Qty: 1 BOTTLE | Refills: 6 | Status: SHIPPED | OUTPATIENT
Start: 2020-05-04 | End: 2020-07-13

## 2020-06-26 ENCOUNTER — TELEPHONE (OUTPATIENT)
Dept: FAMILY MEDICINE | Facility: CLINIC | Age: 60
End: 2020-06-26

## 2020-06-26 NOTE — TELEPHONE ENCOUNTER
[facility] pharmacy contacted. Last dispensed Januvia picked up should not be out until end of July as he was given #90 day supply. Patient contacted and informed. He states he get 3 bottles for 90 day supply and has not checked his house yet. He will check his house first then if he can not find then writer recommended patient call pharmacy. Pharmacy states they can check with insurance if willing to fill early.    Dion Rodriguez CMA

## 2020-06-26 NOTE — TELEPHONE ENCOUNTER
Reason for Call:  Other prescription    Detailed comments: Pt stated the pharmacy won't refill his Januvia 100MG. He wants the provider to contact the pharmacy.     Phone Number Patient can be reached at: Cell number on file:    Telephone Information:   Mobile [phone]       Best Time: Anytime.    Can we leave a detailed message on this number? YES    Call taken on 6/26/2020 at 4:33 PM by Helen Remy

## 2020-06-30 DIAGNOSIS — M19.079 ARTHRITIS OF FIRST MTP JOINT: ICD-10-CM

## 2020-07-02 NOTE — TELEPHONE ENCOUNTER
Routing refill request to provider for review/approval because:  Labs not current:  CBC and AST    Concepcion Odom RN, [facility] Triage

## 2020-07-10 ENCOUNTER — OFFICE VISIT (OUTPATIENT)
Dept: OPHTHALMOLOGY | Facility: CLINIC | Age: 60
End: 2020-07-10
Payer: COMMERCIAL

## 2020-07-10 DIAGNOSIS — Z01.00 EXAMINATION OF EYES AND VISION: Primary | ICD-10-CM

## 2020-07-10 DIAGNOSIS — H52.03 HYPERMETROPIA OF BOTH EYES: ICD-10-CM

## 2020-07-10 DIAGNOSIS — H40.1131 PRIMARY OPEN ANGLE GLAUCOMA OF BOTH EYES, MILD STAGE: ICD-10-CM

## 2020-07-10 DIAGNOSIS — H52.4 PRESBYOPIA: ICD-10-CM

## 2020-07-10 DIAGNOSIS — H25.13 NUCLEAR SCLEROTIC CATARACT OF BOTH EYES: ICD-10-CM

## 2020-07-10 DIAGNOSIS — H52.223 REGULAR ASTIGMATISM OF BOTH EYES: ICD-10-CM

## 2020-07-10 DIAGNOSIS — E11.9 TYPE 2 DIABETES MELLITUS WITHOUT COMPLICATION, WITHOUT LONG-TERM CURRENT USE OF INSULIN (H): ICD-10-CM

## 2020-07-10 PROCEDURE — 92015 DETERMINE REFRACTIVE STATE: CPT | Performed by: STUDENT IN AN ORGANIZED HEALTH CARE EDUCATION/TRAINING PROGRAM

## 2020-07-10 PROCEDURE — 92014 COMPRE OPH EXAM EST PT 1/>: CPT | Performed by: STUDENT IN AN ORGANIZED HEALTH CARE EDUCATION/TRAINING PROGRAM

## 2020-07-10 ASSESSMENT — CUP TO DISC RATIO
OS_RATIO: 0.7
OD_RATIO: 0.65

## 2020-07-10 ASSESSMENT — VISUAL ACUITY
OD_PH_CC: 20/30
OS_PH_CC+: -1
METHOD: SNELLEN - LINEAR
OS_PH_CC: 20/30
OD_PH_CC+: -2
OD_CC+: -2
OS_CC+: -2
CORRECTION_TYPE: GLASSES
OD_CC: 20/40
OS_CC: 20/50

## 2020-07-10 ASSESSMENT — REFRACTION_WEARINGRX
OD_AXIS: 009
OS_ADD: +2.75
OS_SPHERE: +1.00
OS_CYLINDER: +0.75
OS_AXIS: 166
OD_CYLINDER: +0.75
SPECS_TYPE: PAL
OD_SPHERE: +1.25
OD_ADD: +2.75

## 2020-07-10 ASSESSMENT — CONF VISUAL FIELD
OS_SUPERIOR_NASAL_RESTRICTION: 3
OD_NORMAL: 1

## 2020-07-10 ASSESSMENT — REFRACTION_MANIFEST
OD_SPHERE: +1.50
OS_ADD: +2.75
OS_AXIS: 175
OD_ADD: +2.75
OD_AXIS: 165
OS_CYLINDER: +0.75
OS_SPHERE: +1.75
OD_CYLINDER: +1.00

## 2020-07-10 ASSESSMENT — SLIT LAMP EXAM - LIDS
COMMENTS: NORMAL
COMMENTS: NORMAL

## 2020-07-10 ASSESSMENT — EXTERNAL EXAM - LEFT EYE: OS_EXAM: NORMAL

## 2020-07-10 ASSESSMENT — EXTERNAL EXAM - RIGHT EYE: OD_EXAM: NORMAL

## 2020-07-10 ASSESSMENT — TONOMETRY
OS_IOP_MMHG: 18
IOP_METHOD: APPLANATION
OD_IOP_MMHG: 18

## 2020-07-10 NOTE — PATIENT INSTRUCTIONS
Continue Latanoprost (green top) at bedtime both eyes     Glasses prescription given - optional to update    Keep blood sugars and blood pressure under good control.    Katherine Danielson MD  [phone]    Patient Education   Diabetes weakens the blood vessels all over the body, including the eyes. Damage to the blood vessels in the eyes can cause swelling or bleeding into part of the eye (called the retina). This is called diabetic retinopathy (JÚNIOR-tin--puh-thee). If not treated, this disease can cause vision loss or blindness.   Symptoms may include blurred or distorted vision, but many people have no symptoms. It's important to see your eye doctor regularly to check for problems.   Early treatment and good control can help protect your vision. Here are the things you can do to help prevent vision loss:      1. Keep your blood sugar levels under tight control.      2. Bring high blood pressure under control.      3. No smoking.      4. Have yearly dilated eye exams.

## 2020-07-10 NOTE — PROGRESS NOTES
Current Eye Medications:  Latanoprost at bedtime both eyes     Subjective:  Here for complete today. DM, last a1c was 8.0 on 4-20-20.  Says he is too lazy and doesn't eat the right foods even though his wife is a nurse. Eyesight has been the same no changes.      Objective:  See Ophthalmology Exam.       Assessment:  Ren Farrar is a 60 year old male who presents with:   Encounter Diagnoses   Name Primary?     Examination of eyes and vision      Hypermetropia of both eyes      Regular astigmatism of both eyes      Presbyopia        Primary open angle glaucoma of both eyes, mild stage; s/p SLT #1 OD 8/2018 Discs and pressure stable.      Type 2 diabetes mellitus without complication, without long-term current use of insulin (H) Negative diabetic retinopathy      Nuclear sclerotic cataract of both eyes        Plan:  Continue Latanoprost (green top) at bedtime both eyes     Glasses prescription given - optional to update    Keep blood sugars and blood pressure under good control.    Katherine Danielson MD  [phone]

## 2020-07-10 NOTE — LETTER
7/10/2020         RE: Ren Farrar  [address]  [place] [address]        Dear Colleague,    Thank you for referring your patient, Ren Farrar, to the [facility]. Please see a copy of my visit note below.     Current Eye Medications:  Latanoprost at bedtime both eyes     Subjective:  Here for complete today. DM, last a1c was 8.0 on 4-20-20.  Says he is too lazy and doesn't eat the right foods even though his wife is a nurse. Eyesight has been the same no changes.      Objective:  See Ophthalmology Exam.       Assessment:  Ren Farrar is a 60 year old male who presents with:   Encounter Diagnoses   Name Primary?     Examination of eyes and vision      Hypermetropia of both eyes      Regular astigmatism of both eyes      Presbyopia        Primary open angle glaucoma of both eyes, mild stage; s/p SLT #1 OD 8/2018 Discs and pressure stable.      Type 2 diabetes mellitus without complication, without long-term current use of insulin (H) Negative diabetic retinopathy      Nuclear sclerotic cataract of both eyes        Plan:  Continue Latanoprost (green top) at bedtime both eyes     Glasses prescription given - optional to update    Keep blood sugars and blood pressure under good control.    Katherine Danielson MD  [phone]        Again, thank you for allowing me to participate in the care of your patient.        Sincerely,        Katherine Danielson MD

## 2020-07-13 RX ORDER — LATANOPROST 50 UG/ML
1 SOLUTION/ DROPS OPHTHALMIC AT BEDTIME
Qty: 1 BOTTLE | Refills: 6 | Status: SHIPPED | OUTPATIENT
Start: 2020-07-13 | End: 2020-09-16

## 2020-08-13 DIAGNOSIS — M19.079 ARTHRITIS OF FIRST MTP JOINT: ICD-10-CM

## 2020-08-17 NOTE — TELEPHONE ENCOUNTER
Routing refill request to provider for review/approval because:    Due to not having the following:    Normal AST on file in past 12 months     Normal CBC on file in past 12 months     Elza Winkler RN

## 2020-09-01 ENCOUNTER — OFFICE VISIT (OUTPATIENT)
Dept: FAMILY MEDICINE | Facility: CLINIC | Age: 60
End: 2020-09-01
Payer: COMMERCIAL

## 2020-09-01 VITALS
HEIGHT: 72 IN | HEART RATE: 97 BPM | BODY MASS INDEX: 32.51 KG/M2 | TEMPERATURE: 98.6 F | WEIGHT: 240 LBS | DIASTOLIC BLOOD PRESSURE: 75 MMHG | SYSTOLIC BLOOD PRESSURE: 119 MMHG | OXYGEN SATURATION: 97 % | RESPIRATION RATE: 18 BRPM

## 2020-09-01 DIAGNOSIS — E11.65 TYPE 2 DIABETES MELLITUS WITH HYPERGLYCEMIA, WITHOUT LONG-TERM CURRENT USE OF INSULIN (H): Primary | ICD-10-CM

## 2020-09-01 DIAGNOSIS — I10 ESSENTIAL HYPERTENSION WITH GOAL BLOOD PRESSURE LESS THAN 140/90: ICD-10-CM

## 2020-09-01 DIAGNOSIS — E78.5 HYPERLIPIDEMIA LDL GOAL <100: ICD-10-CM

## 2020-09-01 DIAGNOSIS — Z23 NEED FOR INFLUENZA VACCINATION: ICD-10-CM

## 2020-09-01 LAB
ALT SERPL W P-5'-P-CCNC: 25 U/L (ref 0–70)
CHOLEST SERPL-MCNC: 179 MG/DL
CREAT SERPL-MCNC: 1.01 MG/DL (ref 0.66–1.25)
GFR SERPL CREATININE-BSD FRML MDRD: 79 ML/MIN/{1.73_M2}
GLUCOSE BLD-MCNC: 189 MG/DL (ref 70–99)
HBA1C MFR BLD: 8.1 % (ref 0–5.6)
HDLC SERPL-MCNC: 81 MG/DL
LDLC SERPL CALC-MCNC: 84 MG/DL
NONHDLC SERPL-MCNC: 98 MG/DL
POTASSIUM SERPL-SCNC: 3.8 MMOL/L (ref 3.4–5.3)
TRIGL SERPL-MCNC: 70 MG/DL

## 2020-09-01 PROCEDURE — 99207 C FOOT EXAM  NO CHARGE: CPT | Performed by: FAMILY MEDICINE

## 2020-09-01 PROCEDURE — 82947 ASSAY GLUCOSE BLOOD QUANT: CPT | Performed by: FAMILY MEDICINE

## 2020-09-01 PROCEDURE — 84460 ALANINE AMINO (ALT) (SGPT): CPT | Performed by: FAMILY MEDICINE

## 2020-09-01 PROCEDURE — 84132 ASSAY OF SERUM POTASSIUM: CPT | Performed by: FAMILY MEDICINE

## 2020-09-01 PROCEDURE — 80061 LIPID PANEL: CPT | Performed by: FAMILY MEDICINE

## 2020-09-01 PROCEDURE — 90471 IMMUNIZATION ADMIN: CPT | Performed by: FAMILY MEDICINE

## 2020-09-01 PROCEDURE — 90682 RIV4 VACC RECOMBINANT DNA IM: CPT | Performed by: FAMILY MEDICINE

## 2020-09-01 PROCEDURE — 36415 COLL VENOUS BLD VENIPUNCTURE: CPT | Performed by: FAMILY MEDICINE

## 2020-09-01 PROCEDURE — 83036 HEMOGLOBIN GLYCOSYLATED A1C: CPT | Performed by: FAMILY MEDICINE

## 2020-09-01 PROCEDURE — 82565 ASSAY OF CREATININE: CPT | Performed by: FAMILY MEDICINE

## 2020-09-01 PROCEDURE — 99214 OFFICE O/P EST MOD 30 MIN: CPT | Mod: 25 | Performed by: FAMILY MEDICINE

## 2020-09-01 ASSESSMENT — PAIN SCALES - GENERAL: PAINLEVEL: MODERATE PAIN (4)

## 2020-09-01 ASSESSMENT — MIFFLIN-ST. JEOR: SCORE: 1918.69

## 2020-09-01 NOTE — PROGRESS NOTES
Subjective     Ren Farrar is a 60 year old male who presents to clinic today for the following health issues:    HPI       Diabetes Follow-up    How often are you checking your blood sugar? Two times daily  Blood sugar testing frequency justification:  Uncontrolled diabetes  What time of day are you checking your blood sugars (select all that apply)?  Before and after meals  Have you had any blood sugars above 200?  Yes sometimes  Have you had any blood sugars below 70?  No    What symptoms do you notice when your blood sugar is low?  None    What concerns do you have today about your diabetes? None      Do you have any of these symptoms? (Select all that apply)  No numbness or tingling in feet.  No redness, sores or blisters on feet.  No complaints of excessive thirst.  No reports of blurry vision.  No significant changes to weight.        Hyperlipidemia Follow-Up      Are you regularly taking any medication or supplement to lower your cholesterol?   No    Are you having muscle aches or other side effects that you think could be caused by your cholesterol lowering medication?  No    Hypertension Follow-up      Do you check your blood pressure regularly outside of the clinic? Yes     Are you following a low salt diet? No    Are your blood pressures ever more than 140 on the top number (systolic) OR more   than 90 on the bottom number (diastolic), for example 140/90? No    BP Readings from Last 2 Encounters:   09/01/20 119/75   04/07/20 131/85     Hemoglobin A1C (%)   Date Value   09/01/2020 8.1 (H)   04/02/2020 8.0 (H)     LDL Cholesterol Calculated (mg/dL)   Date Value   04/02/2020 82   10/24/2019 92         How many servings of fruits and vegetables do you eat daily?  2-3    On average, how many sweetened beverages do you drink each day (Examples: soda, juice, sweet tea, etc.  Do NOT count diet or artificially sweetened beverages)?   0    How many days per week do you exercise enough to make your heart beat  "faster? 3 or less    How many minutes a day do you exercise enough to make your heart beat faster? 9 or less    How many days per week do you miss taking your medication? 0      Past medical, family, and social histories, medications, and allergies are reviewed and updated in [facility].     Review of Systems   Constitutional, HEENT, cardiovascular, pulmonary, gi and gu systems are negative, except as otherwise noted.    OBJECTIVE:   /75 (BP Location: Right arm, Patient Position: Chair, Cuff Size: Adult Large)   Pulse 97   Temp 98.6  F (37  C) (Oral)   Resp 18   Ht 1.816 m (5' 11.5\")   Wt 108.9 kg (240 lb)   SpO2 97%   BMI 33.01 kg/m    Body mass index is 33.01 kg/m .  Physical Exam   GENERAL: healthy, alert and no distress  EYES: Eyes grossly normal to inspection, PERRL, EOMI, sclerae white and conjunctivae normal  RESP: lungs clear to auscultation - no crackles or wheezes, no areas of dullness, no tachypnea  CV: Heart regular rate and rhythm without murmur, click or rub. No peripheral edema and peripheral pulses strong  MS: no gross musculoskeletal defects noted, no edema  SKIN: no suspicious lesions or rashes to visible skin  NEURO: Normal strength and tone, sensory exam grossly normal, mentation intact, oriented times 3 and cranial nerves 2-12 intact  PSYCH: mentation appears normal, affect normal/bright   Diabetic foot exam: normal DP and PT pulses, no trophic changes or ulcerative lesions, normal sensory exam and normal monofilament exam    Diagnostic Test Results:     Ref. Range 9/1/2020 12:59   Hemoglobin A1C Latest Ref Range: 0 - 5.6 % 8.1 (H)   Glucose Latest Ref Range: 70 - 99 mg/dL 189 (H)     Lab Results   Component Value Date    A1C 8.1 09/01/2020    A1C 8.0 04/02/2020    A1C 7.3 10/24/2019    A1C 7.5 03/15/2019    A1C 8.6 11/29/2018          ASSESSMENT / PLAN:    ASSESSMENT/PLAN:  (E11.65) Type 2 diabetes mellitus with hyperglycemia, without long-term current use of insulin (H)  (primary " encounter diagnosis)  Comment: Uncontrolled.  Despite perceived improvement in lifestyle (following his April appointment), his hemoglobin A1c went up a little bit.  He is on the maximum dose of metformin as well as sitagliptin.  Plan: Hemoglobin A1c, Glucose whole blood, FOOT EXAM,        dulaglutide (TRULICITY) 0.75 MG/0.5ML pen, MED         THERAPY MANAGE REFERRAL, Albumin Random Urine         Quantitative with Creat Ratio         Much do 1 or more of the following (preferably all 3): #1 strict improvement in exercise routine to hopefully lose weight, #2 strict improvement in diet to hopefully lose weight as well as improve glucose readings, #3 add another agent (potential weight gain with sulfonylurea, pioglitazone, and perhaps insulin glargine), preferably a GLP-1 agonist once weekly.  If he is agreeable to this, he should schedule with MTM for teaching, and he would stop taking the sitagliptin if he starts the Trulicity. Return in about 10 weeks (around 11/10/2020) for diabetes.  (Or 2.5 months after he starts Trulicity)    (E78.5) Hyperlipidemia LDL goal <100  Comment: Historically at goal  Plan: Lipid panel reflex to direct LDL Non-fasting,         ALT        Recheck every 6 to 12 months    (I10) Essential hypertension with goal blood pressure less than 140/90  Comment: Well-controlled  Plan: Potassium, Creatinine          (Z23) Need for influenza vaccination  Comment: Influenza vaccine offered and accepted by patient. He has received it before without problems.   Plan: C RIV4 (FLUBLOK) VACCINE RECOMBINANT DNA PRSRV         ANTIBIO FREE, IM, VACCINE ADMINISTRATION,         INITIAL             Humberto Humphrey MD

## 2020-09-01 NOTE — PATIENT INSTRUCTIONS
Patient Education     Diabetes: Learning About Serving and Portion Sizes     A good rule of thumb: Devote half your plate to vegetables and green salad. Split the other half between protein and starchy carbohydrates. Fruit makes a good dessert.   Servings and portions. What s the difference? These terms can be very confusing. But learning to measure serving sizes can help you figure out how many carbohydrates ( carbs ) and other foods you eat each day. They are also powerful tools for managing your weight.  Servings and portions  Many different words are used to describe amounts of food. If your healthcare provider uses a term you re not sure of, don t be afraid to ask. It helps to know the difference between servings and portions:    A serving size is a fixed size. Food producers use this term to describe their products. For example, the label on a cereal box could say that 1 cup of dry cereal = 1 serving.    A portion (also called a  helping ) is how much you eat or how much you put on your plate at a meal. For example, you might eat 2 cups of cereal at breakfast.  Watching serving sizes  The portion you choose to eat (such as 2 cups of cereal) may be more than 1 serving as listed on the food label (such as 1 cup of cereal). That s why it helps to measure or weigh the food you eat. Because the food label values are based on servings, you ll need to know how many servings you eat at 1 sitting.  When you re planning for a snack or a meal, keep servings in mind. If you don t have measuring cups or a scale handy, there are ways to  eyeball  serving sizes, such as comparing your food to the size of your hand (see pictures above).       1 tablespoon is about the size of your thumb.  1 teaspoon is about the size of the tip of your thumb.  1 serving of meat or poultry is the size of the palm of your hand.   Managing portion sizes  If your weight is a concern, reducing your portions can help. You can eat more than one  serving of a food at once. But to keep from eating too much at one meal, learn how to manage your portions. A portion is the amount of each type of food on your plate.   Date Last Reviewed: 3/1/2016    3260-8072 [employer]. [address] [place] [address]. All rights reserved. This information is not intended as a substitute for professional medical care. Always follow your healthcare professional's instructions.           Patient Education     Long-Term Complications of Diabetes    Diabetes can cause health problems over time. These are called complications. They are more likely to happen if your blood sugar is often too high. Over time, high blood sugar can damage blood vessels in your body. It is important to keep your blood sugar in your target range. This can help prevent or delay complications from diabetes.  Possible complications  Diabetes can cause:    Eye problems, such as damage to the blood vessels in the eyes, pressure in the eye, and clouding of the eye s lens. In time, eye problems can lead to bleeding and blindness.     Tooth and gum problems. These can cause loss of teeth and bone.    Blood vessel disease, leading to circulation problems, heart attack, or stroke. Or you may need a limb removed because of poor blood flow to the skin.    Problems with sexual function. Men may have erectile dysfunction. Women may have sexual discomfort.     Kidney disease, leading to kidney failure. You may need dialysis or a kidney transplant.     Nerve problems, causing pain or loss of feeling in your feet and other parts of your body. This may lead to loss of a limb.     High blood pressure. This health problem puts strain on your heart and blood vessels.    Serious infections. These can possibly lead to loss of toes, feet, or limbs.  How to prevent complications  You can prevent these serious problems by managing your blood sugar, blood pressure, and cholesterol. This can help you feel  better and stay healthy. You can control diabetes by tracking your blood sugar. You can also eat healthy and exercise to prevent weight gain. If you smoke, stop all tobacco products. It will greatly improve your overall health. And take medicine if directed by your healthcare provider.  Date Last Reviewed: 8/1/2018 2000-2019 [employer]. [address] [place] [address], [place] [address]. All rights reserved. This information is not intended as a substitute for professional medical care. Always follow your healthcare professional's instructions.           Patient Education     Dulaglutide injection  Brand Name: TRULICITY  What is this medicine?  DULAGLUTIDE (DOO la GLOO tide) is used to improve blood sugar control in adults with type 2 diabetes. This medicine may be used with other oral diabetes medicines.  How should I use this medicine?  This medicine is for injection under the skin of your upper leg (thigh), stomach area, or upper arm. It is usually given once every week (every 7 days). You will be taught how to prepare and give this medicine. Use exactly as directed. Take your medicine at regular intervals. Do not take it more often than directed.  If you use this medicine with insulin, you should inject this medicine and the insulin separately. Do not mix them together. Do not give the injections right next to each other. Change (rotate) injection sites with each injection.  It is important that you put your used needles and syringes in a special sharps container. Do not put them in a trash can. If you do not have a sharps container, call your pharmacist or healthcare provider to get one.  A special MedGuide will be given to you by the pharmacist with each prescription and refill. Be sure to read this information carefully each time.  Talk to your pediatrician regarding the use of this medicine in children. Special care may be needed.  What side effects may I notice from receiving this medicine?  Side  effects that you should report to your doctor or health care professional as soon as possible:    allergic reactions like skin rash, itching or hives, swelling of the face, lips, or tongue    breathing problems    diarrhea that continues or is severe    lump or swelling on the neck    severe nausea    signs and symptoms of infection like fever or chills; cough; sore throat; pain or trouble passing urine    signs and symptoms of low blood sugar such as feeling anxious, confusion, dizziness, increased hunger, unusually weak or tired, sweating, shakiness, cold, irritable, headache, blurred vision, fast heartbeat, loss of consciousness    signs and symptoms of kidney injury like trouble passing urine or change in the amount of urine    trouble swallowing    unusual stomach upset or pain    vomiting  Side effects that usually do not require medical attention (report to your doctor or health care professional if they continue or are bothersome):    diarrhea    loss of appetite    nausea    pain, redness, or irritation at site where injected    stomach upset  What may interact with this medicine?      other medicines for diabetes  Many medications may cause changes in blood sugar, these include:    alcohol containing beverages    antiviral medicines for HIV or AIDS    aspirin and aspirin-like drugs    certain medicines for blood pressure, heart disease, irregular heart beat    chromium    diuretics    female hormones, such as estrogens or progestins, birth control pills    fenofibrate    gemfibrozil    isoniazid    lanreotide    male hormones or anabolic steroids    MAOIs like Carbex, Eldepryl, Marplan, Nardil, and Parnate    medicines for weight loss    medicines for allergies, asthma, cold, or cough    medicines for depression, anxiety, or psychotic disturbances    niacin    nicotine    NSAIDs, medicines for pain and inflammation, like ibuprofen or  naproxen    octreotide    pasireotide    pentamidine    phenytoin    probenecid    quinolone antibiotics such as ciprofloxacin, levofloxacin, ofloxacin    some herbal dietary supplements    steroid medicines such as prednisone or cortisone    sulfamethoxazole; trimethoprim    thyroid hormones  Some medications can hide the warning symptoms of low blood sugar (hypoglycemia). You may need to monitor your blood sugar more closely if you are taking one of these medications. These include:    beta-blockers, often used for high blood pressure or heart problems (examples include atenolol, metoprolol, propranolol)    clonidine    guanethidine    reserpine  What if I miss a dose?  If you miss a dose, take it as soon as you can within 3 days after the missed dose. Then take your next dose at your regular weekly time. If it has been longer than 3 days after the missed dose, do not take the missed dose. Take the next dose at your regular time. Do not take double or extra doses. If you have questions about a missed dose, contact your health care provider for advice.  Where should I keep my medicine?  Keep out of the reach of children.  Store unopened pens in a refrigerator between 2 and 8 degrees C (36 and 46 degrees F). Do not freeze or use if the medicine has been frozen. Protect from light and excessive heat. Store in the carton until use. Each single-dose pen can be kept at room temperature, not to exceed 30 degrees C (86 degrees F) for a total of 14 days, if needed. Throw away any unused medicine after the expiration date on the label.  What should I tell my health care provider before I take this medicine?  They need to know if you have any of these conditions:    endocrine tumors (MEN 2) or if someone in your family had these tumors    history of pancreatitis    kidney disease    liver disease    stomach problems    thyroid cancer or if someone in your family had thyroid cancer    an unusual or allergic reaction to  dulaglutide, other medicines, foods, dyes, or preservatives    pregnant or trying to get pregnant    breast-feeding  What should I watch for while using this medicine?  Visit your doctor or health care professional for regular checks on your progress.  Drink plenty of fluids while taking this medicine. Check with your doctor or health care professional if you get an attack of severe diarrhea, nausea, and vomiting. The loss of too much body fluid can make it dangerous for you to take this medicine.  A test called the HbA1C (A1C) will be monitored. This is a simple blood test. It measures your blood sugar control over the last 2 to 3 months. You will receive this test every 3 to 6 months.  Learn how to check your blood sugar. Learn the symptoms of low and high blood sugar and how to manage them.  Always carry a quick-source of sugar with you in case you have symptoms of low blood sugar. Examples include hard sugar candy or glucose tablets. Make sure others know that you can choke if you eat or drink when you develop serious symptoms of low blood sugar, such as seizures or unconsciousness. They must get medical help at once.  Tell your doctor or health care professional if you have high blood sugar. You might need to change the dose of your medicine. If you are sick or exercising more than usual, you might need to change the dose of your medicine.  Do not skip meals. Ask your doctor or health care professional if you should avoid alcohol. Many nonprescription cough and cold products contain sugar or alcohol. These can affect blood sugar.  Pens should never be shared. Even if the needle is changed, sharing may result in passing of viruses like hepatitis or HIV.  Wear a medical ID bracelet or chain, and carry a card that describes your disease and details of your medicine and dosage times.  NOTE:This sheet is a summary. It may not cover all possible information. If you have questions about this medicine, talk to your  doctor, pharmacist, or health care provider. Copyright  2019 [employer]

## 2020-09-03 ENCOUNTER — TELEPHONE (OUTPATIENT)
Dept: FAMILY MEDICINE | Facility: CLINIC | Age: 60
End: 2020-09-03

## 2020-09-03 NOTE — TELEPHONE ENCOUNTER
MTM referral from: [facility] visit (referral by provider)    MTM referral outreach attempt #2 on September 3, 2020 at 3:17 PM      Outcome: Patient not reachable after several attempts, will route to MTM Pharmacist/Provider as an FYI. Thank you for the referral.    Bree Marshall, MTM Coordinator

## 2020-09-16 DIAGNOSIS — H40.1131 PRIMARY OPEN ANGLE GLAUCOMA OF BOTH EYES, MILD STAGE: ICD-10-CM

## 2020-09-16 RX ORDER — LATANOPROST 50 UG/ML
1 SOLUTION/ DROPS OPHTHALMIC AT BEDTIME
Qty: 1 BOTTLE | Refills: 6 | Status: SHIPPED | OUTPATIENT
Start: 2020-09-16 | End: 2021-04-20

## 2020-10-17 DIAGNOSIS — E11.9 TYPE 2 DIABETES MELLITUS WITHOUT COMPLICATION, WITHOUT LONG-TERM CURRENT USE OF INSULIN (H): ICD-10-CM

## 2020-10-20 RX ORDER — METFORMIN HCL 500 MG
2000 TABLET, EXTENDED RELEASE 24 HR ORAL
Qty: 360 TABLET | Refills: 1 | Status: SHIPPED | OUTPATIENT
Start: 2020-10-20 | End: 2021-04-21

## 2020-10-20 NOTE — TELEPHONE ENCOUNTER
Prescription approved per [facility] Refill Protocol.  Sheyla Farah RN  [facility]

## 2020-10-25 DIAGNOSIS — E11.9 TYPE 2 DIABETES MELLITUS WITHOUT COMPLICATION, WITHOUT LONG-TERM CURRENT USE OF INSULIN (H): ICD-10-CM

## 2020-10-27 RX ORDER — SITAGLIPTIN 100 MG/1
TABLET, FILM COATED ORAL
Qty: 90 TABLET | Refills: 0 | Status: SHIPPED | OUTPATIENT
Start: 2020-10-27 | End: 2021-01-03

## 2020-10-27 NOTE — TELEPHONE ENCOUNTER
Prescription approved per [facility] Refill Protocol.    Bijal Claire RN  [facility]

## 2020-11-09 DIAGNOSIS — I10 ESSENTIAL HYPERTENSION WITH GOAL BLOOD PRESSURE LESS THAN 140/90: ICD-10-CM

## 2020-11-12 RX ORDER — AMLODIPINE AND BENAZEPRIL HYDROCHLORIDE 5; 20 MG/1; MG/1
1 CAPSULE ORAL DAILY
Qty: 90 CAPSULE | Refills: 2 | Status: SHIPPED | OUTPATIENT
Start: 2020-11-12 | End: 2021-07-21

## 2020-12-22 DIAGNOSIS — E11.9 TYPE 2 DIABETES MELLITUS WITHOUT COMPLICATION, WITHOUT LONG-TERM CURRENT USE OF INSULIN (H): ICD-10-CM

## 2020-12-22 DIAGNOSIS — M19.079 ARTHRITIS OF FIRST MTP JOINT: ICD-10-CM

## 2020-12-24 NOTE — TELEPHONE ENCOUNTER
Routing refill request to provider for review/approval because:  Labs not current:  ALT, CBC, A1C    Concepcion Odom RN, [facility] Triage

## 2021-01-03 RX ORDER — SITAGLIPTIN 100 MG/1
TABLET, FILM COATED ORAL
Qty: 90 TABLET | Refills: 0 | Status: SHIPPED | OUTPATIENT
Start: 2021-01-03 | End: 2021-01-25 | Stop reason: ALTCHOICE

## 2021-01-04 NOTE — TELEPHONE ENCOUNTER
Signed Prescriptions:                        Disp   Refills    diclofenac (VOLTAREN) 50 MG EC tablet      60 tab*1        Sig: TAKE 1 TABLET (50 MG) BY MOUTH 3 TIMES DAILY AS           NEEDED FOR MODERATE PAIN (TOE) . TAKE WITH FOOD.  Authorizing Provider: BERLIN DICKSONUVIA 100 MG tablet                      90 tab*0        Sig: TAKE 1 TABLET (100 MG) BY MOUTH DAILY FOR DIABETES.  Authorizing Provider: BERLIN DICKSON,  For [facility]st Floor Primary Care    Called, patient is scheduled.  Wm Schwab,  For [facility]st Floor [facility]

## 2021-01-25 ENCOUNTER — OFFICE VISIT (OUTPATIENT)
Dept: FAMILY MEDICINE | Facility: CLINIC | Age: 61
End: 2021-01-25
Payer: COMMERCIAL

## 2021-01-25 VITALS
HEART RATE: 94 BPM | WEIGHT: 241.4 LBS | SYSTOLIC BLOOD PRESSURE: 132 MMHG | RESPIRATION RATE: 16 BRPM | BODY MASS INDEX: 33.2 KG/M2 | TEMPERATURE: 98.6 F | OXYGEN SATURATION: 97 % | DIASTOLIC BLOOD PRESSURE: 62 MMHG

## 2021-01-25 DIAGNOSIS — E78.5 HYPERLIPIDEMIA LDL GOAL <100: ICD-10-CM

## 2021-01-25 DIAGNOSIS — E11.65 TYPE 2 DIABETES MELLITUS WITH HYPERGLYCEMIA, WITHOUT LONG-TERM CURRENT USE OF INSULIN (H): Primary | ICD-10-CM

## 2021-01-25 DIAGNOSIS — I10 ESSENTIAL HYPERTENSION WITH GOAL BLOOD PRESSURE LESS THAN 140/90: ICD-10-CM

## 2021-01-25 DIAGNOSIS — R60.9 DEPENDENT EDEMA: ICD-10-CM

## 2021-01-25 DIAGNOSIS — M79.674 PAIN OF TOE OF RIGHT FOOT: ICD-10-CM

## 2021-01-25 LAB
GLUCOSE BLD-MCNC: 155 MG/DL (ref 70–99)
HBA1C MFR BLD: 7.7 % (ref 0–5.6)
URATE SERPL-MCNC: 6.5 MG/DL (ref 3.5–7.2)

## 2021-01-25 PROCEDURE — 84550 ASSAY OF BLOOD/URIC ACID: CPT | Performed by: FAMILY MEDICINE

## 2021-01-25 PROCEDURE — 82947 ASSAY GLUCOSE BLOOD QUANT: CPT | Performed by: FAMILY MEDICINE

## 2021-01-25 PROCEDURE — 83036 HEMOGLOBIN GLYCOSYLATED A1C: CPT | Performed by: FAMILY MEDICINE

## 2021-01-25 PROCEDURE — 36415 COLL VENOUS BLD VENIPUNCTURE: CPT | Performed by: FAMILY MEDICINE

## 2021-01-25 PROCEDURE — 99214 OFFICE O/P EST MOD 30 MIN: CPT | Performed by: FAMILY MEDICINE

## 2021-01-25 RX ORDER — HYDROCHLOROTHIAZIDE 12.5 MG/1
25 TABLET ORAL DAILY
Qty: 180 TABLET | Refills: 1 | Status: SHIPPED | OUTPATIENT
Start: 2021-01-25 | End: 2021-10-31

## 2021-01-25 RX ORDER — NIACIN 500 MG/1
2000 TABLET, EXTENDED RELEASE ORAL AT BEDTIME
Qty: 360 TABLET | Refills: 1 | Status: SHIPPED | OUTPATIENT
Start: 2021-01-25 | End: 2021-07-21

## 2021-01-25 NOTE — PROGRESS NOTES
Assessment & Plan     Type 2 diabetes mellitus with hyperglycemia, without long-term current use of insulin (H)  Significantly improved on the Trulicity  - Hemoglobin A1c  - Glucose whole blood  - dulaglutide (TRULICITY) 0.75 MG/0.5ML pen; Inject 0.75 mg Subcutaneous every 7 days for diabetes.   Return in about 13 weeks (around 4/26/2021) for full physical, diabetes.      Pain of toe of right foot  Rule out gout.  I am suspicious that he has arthritis instead  - Uric acid  - Orthopedic [facility] Sp[facility]  Referral; Future    Dependent edema  Essential hypertension with goal blood pressure less than 140/90  Blood pressure well controlled  - hydrochlorothiazide (HYDRODIURIL) 12.5 MG tablet; Take 2 tablets (25 mg) by mouth daily for leg swelling (and blood pressure).    Hyperlipidemia LDL goal <100  Intolerant of statin  - niacin ER (NIASPAN) 500 MG CR tablet; Take 4 tablets (2,000 mg) by mouth At Bedtime for cholesterol. Take with low-fat snack.        Return in about 13 weeks (around 4/26/2021) for full physical, diabetes.    Humberto Humphrey MD  [facility]ure is a 60 year old who presents to clinic today for the following health issues     HPI       Diabetes Follow-up       The patient reports that since his last diabetes visit, he is now taking the Trulicity.  Lately, he has increased his exercise, but he was noticing that he was developing pain in his right great toe (he has been taking diclofenac for that)  How often are you checking your blood sugar? One time daily  What time of day are you checking your blood sugars (select all that apply)?  Before and after meals (pre-meal is typically 140s, as low as 119}  Have you had any blood sugars above 200?  Yes after meals  Have you had any blood sugars below 70?  No    What symptoms do you notice when your blood sugar is low?  None    What concerns do you have today about your diabetes? None     Do you have any of  these symptoms? (Select all that apply)  No numbness or tingling in feet.  No redness, sores or blisters on feet.  No complaints of excessive thirst.  No reports of blurry vision.  No significant changes to weight.      BP Readings from Last 2 Encounters:   02/01/21 124/74   01/25/21 132/62     Hemoglobin A1C (%)   Date Value   01/25/2021 7.7 (H)   09/01/2020 8.1 (H)     LDL Cholesterol Calculated (mg/dL)   Date Value   09/01/2020 84   04/02/2020 82           How many servings of fruits and vegetables do you eat daily?  0-1    On average, how many sweetened beverages do you drink each day (Examples: soda, juice, sweet tea, etc.  Do NOT count diet or artificially sweetened beverages)?   0    How many days per week do you exercise enough to make your heart beat faster? 3 or less    How many minutes a day do you exercise enough to make your heart beat faster? 9 or less    How many days per week do you miss taking your medication? 0        Review of Systems   Constitutional, HEENT, cardiovascular, pulmonary, gi and gu systems are negative, except as otherwise noted. Patient would like to discuss Right toe pain, possible gout. States the pain was there and then went away and now has returned.      Objective    /62   Pulse 94   Temp 98.6  F (37  C) (Oral)   Resp 16   Wt 109.5 kg (241 lb 6.4 oz)   SpO2 97%   BMI 33.20 kg/m    Body mass index is 33.2 kg/m .  Physical Exam   GENERAL: healthy, alert and no distress  EYES: Eyes grossly normal to inspection, PERRL, EOMI, sclerae white and conjunctivae normal  RESP: lungs clear to auscultation - no crackles or wheezes, no areas of dullness, no tachypnea  CV: Heart regular rate and rhythm without murmur, click or rub. No peripheral edema and peripheral pulses strong  MS: no gross musculoskeletal defects noted, no edema, and is noted to have pain with passive movement of the right great toe.  This occurs at the IP joint, rather than the MTP joint  SKIN: no suspicious  lesions or rashes to visible skin  NEURO: Normal strength and tone, sensory exam grossly normal, mentation intact, oriented times 3 and cranial nerves 2-12 intact  PSYCH: mentation appears normal, affect normal/bright     Lab Results   Component Value Date    A1C 7.7 01/25/2021    A1C 8.1 09/01/2020    A1C 8.0 04/02/2020    A1C 7.3 10/24/2019    A1C 7.5 03/15/2019

## 2021-02-01 ENCOUNTER — OFFICE VISIT (OUTPATIENT)
Dept: PODIATRY | Facility: CLINIC | Age: 61
End: 2021-02-01
Attending: FAMILY MEDICINE
Payer: COMMERCIAL

## 2021-02-01 ENCOUNTER — ANCILLARY PROCEDURE (OUTPATIENT)
Dept: GENERAL RADIOLOGY | Facility: CLINIC | Age: 61
End: 2021-02-01
Attending: PODIATRIST
Payer: COMMERCIAL

## 2021-02-01 VITALS
DIASTOLIC BLOOD PRESSURE: 74 MMHG | WEIGHT: 242 LBS | SYSTOLIC BLOOD PRESSURE: 124 MMHG | HEART RATE: 96 BPM | BODY MASS INDEX: 33.28 KG/M2 | OXYGEN SATURATION: 95 %

## 2021-02-01 DIAGNOSIS — M79.674 PAIN OF TOE OF RIGHT FOOT: ICD-10-CM

## 2021-02-01 PROCEDURE — 73630 X-RAY EXAM OF FOOT: CPT | Mod: RT | Performed by: RADIOLOGY

## 2021-02-01 PROCEDURE — 99203 OFFICE O/P NEW LOW 30 MIN: CPT | Performed by: PODIATRIST

## 2021-02-01 RX ORDER — MELOXICAM 7.5 MG/1
7.5 TABLET ORAL DAILY
Qty: 30 TABLET | Refills: 0 | Status: SHIPPED | OUTPATIENT
Start: 2021-02-01

## 2021-02-01 NOTE — PROGRESS NOTES
PATIENT HISTORY:  Ren Farrar is a 60 year old male who presents to clinic for right great toe pain.  3 months duration.  Reports a fairly sudden onset.  Seems to be worse with cold.  Pain is also worse with activity.  He has been taking diclofenac intermittently with minimal improvement.  Denies personal history of gout.  Uric acid on 1/25/2021 was normal.  I was asked see this patient for this issue by Dr. Humphrey.  History of type 2 diabetes.    Review of Systems:  Patient denies fever, chills, rash, wound, stiffness, numbness, weakness, heart burn, blood in stool, chest pain with activity, calf pain when walking, shortness of breath with activity, chronic cough, easy bleeding/bruising, swelling of ankles, excessive thirst, fatigue, depression, anxiety.  Patient admits to limping.     PAST MEDICAL HISTORY:   Past Medical History:   Diagnosis Date     Arthritis      Erectile dysfunction 2009     Essential hypertension, benign      GERD (gastroesophageal reflux disease)      Glaucoma      Herpes zoster 12/12/09    RT T5 or T6     Hyperlipidemia LDL goal <100      DARIA (obstructive sleep apnea) 4/5/10    Dr. Ugo Roth, on CPAP     Posterior subcapsular cataract, bilateral 6/13/2013     Type II or unspecified type diabetes mellitus without mention of complication, not stated as uncontrolled 5/13/10        PAST SURGICAL HISTORY:   Past Surgical History:   Procedure Laterality Date     HC TRABECULOPLASTY BY LASER SURGERY       SELECTIVE LASER TRABECULOPLASTY (SLT) OD (RIGHT EYE) Right 08/21/2018    SLT #1 OD (inf 180)        MEDICATIONS:   Current Outpatient Medications:      alcohol swab prep pads, Use to swab area of injection/yanci as directed., Disp: 100 each, Rfl: 3     amLODIPine-benazepril (LOTREL) 5-20 MG capsule, TAKE 1 CAPSULE BY MOUTH DAILY FOR BLOOD PRESSURE., Disp: 90 capsule, Rfl: 2     ASPIRIN 81 MG PO TABS, 1 TABLET DAILY*, Disp: , Rfl: prn     blood glucose (NO BRAND SPECIFIED) test strip, Use to  test blood sugar 4 times daily or as directed. To accompany: Blood Glucose Monitor Brands: per insurance., Disp: 100 strip, Rfl: 6     blood glucose calibration (NO BRAND SPECIFIED) solution, To accompany: Blood Glucose Monitor Brands: per insurance., Disp: 1 Bottle, Rfl: 3     blood glucose monitoring (NO BRAND SPECIFIED) meter device kit, Use to test blood sugar 4 times daily or as directed. Preferred blood glucose meter/supplies to accompany: Monitor Brands: per insurance., Disp: 1 kit, Rfl: 0     dulaglutide (TRULICITY) 0.75 MG/0.5ML pen, Inject 0.75 mg Subcutaneous every 7 days for diabetes., Disp: 6.5 mL, Rfl: 1     hydrochlorothiazide (HYDRODIURIL) 12.5 MG tablet, Take 2 tablets (25 mg) by mouth daily for leg swelling (and blood pressure)., Disp: 180 tablet, Rfl: 1     latanoprost (XALATAN) 0.005 % ophthalmic solution, Place 1 drop into both eyes At Bedtime Please call for your eye appointment. Thank you!, Disp: 1 Bottle, Rfl: 6     meloxicam (MOBIC) 7.5 MG tablet, Take 1 tablet (7.5 mg) by mouth daily, Disp: 30 tablet, Rfl: 0     metFORMIN (GLUCOPHAGE-XR) 500 MG 24 hr tablet, TAKE 4 TABLETS (2,000 MG) BY MOUTH DAILY (WITH LUNCH) FOR DIABETES., Disp: 360 tablet, Rfl: 1     niacin ER (NIASPAN) 500 MG CR tablet, Take 4 tablets (2,000 mg) by mouth At Bedtime for cholesterol. Take with low-fat snack., Disp: 360 tablet, Rfl: 1     sildenafil (VIAGRA) 100 MG tablet, Take 0.5-1 tablets ( mg) by mouth daily as needed (take at least 30 minutes before needed, max 1 dose per day), Disp: 30 tablet, Rfl: 3     STATIN NOT PRESCRIBED, INTENTIONAL,, Causes muscle aches, Disp: , Rfl:      thin (NO BRAND SPECIFIED) lancets, Use with lanceting device. 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[employer])     Employer: [employer]   Social Needs     Financial resource strain: Not on file     Food insecurity     Worry: Not on file     Inability: Not on file     Transportation needs     Medical: Not on file     Non-medical: Not on file   Tobacco Use     Smoking status: Never Smoker     Smokeless tobacco: Never Used   Substance and Sexual Activity     Alcohol use: Yes     Comment: occassioanally     Drug use: No     Sexual activity: Yes     Partners: Female     Birth control/protection: None   Lifestyle     Physical activity     Days per week: Not on file     Minutes per session: Not on file     Stress: Not on file   Relationships     Social connections     Talks on phone: Not on file     Gets together: Not on file     Attends Taoist service: Not on file     Active member of club or organization: Not on file     Attends meetings of clubs or organizations: Not on file     Relationship status: Not on file     Intimate partner violence     Fear of current or ex partner: Not on file     Emotionally abused: Not on file     Physically abused: Not on file     Forced sexual activity: Not on file   Other Topics Concern     Parent/sibling w/ CABG, MI or angioplasty before 65F 55M? Not Asked   Social History Narrative     Not on file        FAMILY HISTORY:   Family History   Problem Relation Age of Onset     C.A.D. Mother      Myocardial Infarction Mother      Hypertension Mother      Diabetes Mother         borderline     Eye Disorder Mother         cataract     Kidney Disease Mother         dialysis     Cataracts Mother      Diabetes Father      Hypertension Father      Heart Failure Father          CHF 81     Diabetes Sister      Thyroid Disease Sister      Depression Son 17     Asthma No family  hx of      Cerebrovascular Disease No family hx of      Breast Cancer No family hx of      Cancer - colorectal No family hx of      Prostate Cancer No family hx of      Allergies No family hx of      Cancer No family hx of      Glaucoma No family hx of      Macular Degeneration No family hx of         EXAM:Vitals: /74   Pulse 96   Wt 109.8 kg (242 lb)   SpO2 95%   BMI 33.28 kg/m    BMI= Body mass index is 33.28 kg/m .    General appearance: Patient is alert and fully cooperative with history & exam.  No sign of distress is noted during the visit.     Psychiatric: Affect is pleasant & appropriate.  Patient appears motivated to improve health.     Respiratory: Breathing is regular & unlabored while sitting.     HEENT: Hearing is intact to spoken word.  Speech is clear.  No gross evidence of visual impairment that would impact ambulation.     Dermatologic: Skin is intact to right foot without significant lesions, rash or abrasion.  No paronychia or evidence of soft tissue infection is noted.     Vascular: DP & PT pulses are intact & regular on the right.  No significant edema or varicosities noted.  CFT and skin temperature are normal to both lower extremities.     Neurologic: Lower extremity sensation is intact to light touch.  No evidence of weakness or contracture in the lower extremities.  No evidence of neuropathy.     Musculoskeletal: Right first toe pain with palpation over the dorsal interphalangeal joint.  No pain with joint motion but this does seem bit limited.  No first metatarsophalangeal joint pain or limitation.  Patient is ambulatory without assistive device or brace.  No gross ankle deformity noted.  No foot or ankle joint effusion is noted.     X-rays of right foot reviewed with patient.  Perhaps a subtle dorsal spur noted at the first toe interphalangeal joint on lateral view.    ASSESSMENT:   Right first toe pain, possible osteoarthritis versus inflammatory arthritis.  Type 2 diabetes  mellitus.     PLAN:  Reviewed patient's chart in epic.  Discussed condition and treatment options including pros and cons.    Etiology of toe pain not entirely clear.  I suspect some mild osteoarthritis.  X-ray findings are subtle.  We discussed possibility of inflammatory arthritis such as gout.  Recent uric acid was normal.  I advised a stiff soled shoe.  RICE.  We will try different anti-inflammatory today.  Mobic prescribed.  Discontinue diclofenac.  Follow-up in 2 to 3 weeks as needed.  Discussed MRI if not improving.  Patient deferred MRI at this point.     Discussed condition.  Discussed diabetic foot care and prevention.  Check feet daily.      Garland Wei DPM, FACFAS

## 2021-02-01 NOTE — LETTER
2/1/2021         RE: Ren Farrar  [address]  [place] [address]        Dear Colleague,    Thank you for referring your patient, Ren Farrar, to the [facility]. Please see a copy of my visit note below.    PATIENT HISTORY:  Ren Farrar is a 60 year old male who presents to clinic for right great toe pain.  3 months duration.  Reports a fairly sudden onset.  Seems to be worse with cold.  Pain is also worse with activity.  He has been taking diclofenac intermittently with minimal improvement.  Denies personal history of gout.  Uric acid on 1/25/2021 was normal.  I was asked see this patient for this issue by Dr. Humphrey.  History of type 2 diabetes.    Review of Systems:  Patient denies fever, chills, rash, wound, stiffness, numbness, weakness, heart burn, blood in stool, chest pain with activity, calf pain when walking, shortness of breath with activity, chronic cough, easy bleeding/bruising, swelling of ankles, excessive thirst, fatigue, depression, anxiety.  Patient admits to limping.     PAST MEDICAL HISTORY:   Past Medical History:   Diagnosis Date     Arthritis      Erectile dysfunction 2009     Essential hypertension, benign      GERD (gastroesophageal reflux disease)      Glaucoma      Herpes zoster 12/12/09    RT T5 or T6     Hyperlipidemia LDL goal <100      DARIA (obstructive sleep apnea) 4/5/10    Dr. Ugo Roth, on CPAP     Posterior subcapsular cataract, bilateral 6/13/2013     Type II or unspecified type diabetes mellitus without mention of complication, not stated as uncontrolled 5/13/10        PAST SURGICAL HISTORY:   Past Surgical History:   Procedure Laterality Date     HC TRABECULOPLASTY BY LASER SURGERY       SELECTIVE LASER TRABECULOPLASTY (SLT) OD (RIGHT EYE) Right 08/21/2018    SLT #1 OD (inf 180)        MEDICATIONS:   Current Outpatient Medications:      alcohol swab prep pads, Use to swab area of injection/yanci as directed., Disp: 100 each, Rfl:  3     amLODIPine-benazepril (LOTREL) 5-20 MG capsule, TAKE 1 CAPSULE BY MOUTH DAILY FOR BLOOD PRESSURE., Disp: 90 capsule, Rfl: 2     ASPIRIN 81 MG PO TABS, 1 TABLET DAILY*, Disp: , Rfl: prn     blood glucose (NO BRAND SPECIFIED) test strip, Use to test blood sugar 4 times daily or as directed. To accompany: Blood Glucose Monitor Brands: per insurance., Disp: 100 strip, Rfl: 6     blood glucose calibration (NO BRAND SPECIFIED) solution, To accompany: Blood Glucose Monitor Brands: per insurance., Disp: 1 Bottle, Rfl: 3     blood glucose monitoring (NO BRAND SPECIFIED) meter device kit, Use to test blood sugar 4 times daily or as directed. Preferred blood glucose meter/supplies to accompany: Monitor Brands: per insurance., Disp: 1 kit, Rfl: 0     dulaglutide (TRULICITY) 0.75 MG/0.5ML pen, Inject 0.75 mg Subcutaneous every 7 days for diabetes., Disp: 6.5 mL, Rfl: 1     hydrochlorothiazide (HYDRODIURIL) 12.5 MG tablet, Take 2 tablets (25 mg) by mouth daily for leg swelling (and blood pressure)., Disp: 180 tablet, Rfl: 1     latanoprost (XALATAN) 0.005 % ophthalmic solution, Place 1 drop into both eyes At Bedtime Please call for your eye appointment. Thank you!, Disp: 1 Bottle, Rfl: 6     meloxicam (MOBIC) 7.5 MG tablet, Take 1 tablet (7.5 mg) by mouth daily, Disp: 30 tablet, Rfl: 0     metFORMIN (GLUCOPHAGE-XR) 500 MG 24 hr tablet, TAKE 4 TABLETS (2,000 MG) BY MOUTH DAILY (WITH LUNCH) FOR DIABETES., Disp: 360 tablet, Rfl: 1     niacin ER (NIASPAN) 500 MG CR tablet, Take 4 tablets (2,000 mg) by mouth At Bedtime for cholesterol. Take with low-fat snack., Disp: 360 tablet, Rfl: 1     sildenafil (VIAGRA) 100 MG tablet, Take 0.5-1 tablets ( mg) by mouth daily as needed (take at least 30 minutes before needed, max 1 dose per day), Disp: 30 tablet, Rfl: 3     STATIN NOT PRESCRIBED, INTENTIONAL,, Causes muscle aches, Disp: , Rfl:      thin (NO BRAND SPECIFIED) lancets, Use with lanceting device. 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[employer])     Employer: [employer]ELF   Social Needs     Financial resource strain: Not on file     Food insecurity     Worry: Not on file     Inability: Not on file     Transportation needs     Medical: Not on file     Non-medical: Not on file   Tobacco Use     Smoking status: Never Smoker     Smokeless tobacco: Never Used   Substance and Sexual Activity     Alcohol use: Yes     Comment: occassioanally     Drug use: No     Sexual activity: Yes     Partners: Female     Birth control/protection: None   Lifestyle     Physical activity     Days per week: Not on file     Minutes per session: Not on file     Stress: Not on file   Relationships     Social connections     Talks on phone: Not on file     Gets together: Not on file     Attends Mandaen service: Not on file     Active member of club or organization: Not on file     Attends meetings of clubs or organizations: Not on file     Relationship status: Not on file     Intimate partner violence     Fear of current or ex partner: Not on file     Emotionally abused: Not on file     Physically abused: Not on file     Forced sexual activity: Not on file   Other Topics Concern     Parent/sibling w/ CABG, MI or angioplasty before 65F 55M? Not Asked   Social History Narrative     Not on file        FAMILY HISTORY:   Family History   Problem Relation Age of Onset     C.A.D. Mother      Myocardial Infarction Mother      Hypertension Mother      Diabetes Mother         borderline     Eye Disorder Mother          cataract     Kidney Disease Mother         dialysis     Cataracts Mother      Diabetes Father      Hypertension Father      Heart Failure Father          CHF 81     Diabetes Sister      Thyroid Disease Sister      Depression Son 17     Asthma No family hx of      Cerebrovascular Disease No family hx of      Breast Cancer No family hx of      Cancer - colorectal No family hx of      Prostate Cancer No family hx of      Allergies No family hx of      Cancer No family hx of      Glaucoma No family hx of      Macular Degeneration No family hx of         EXAM:Vitals: /74   Pulse 96   Wt 109.8 kg (242 lb)   SpO2 95%   BMI 33.28 kg/m    BMI= Body mass index is 33.28 kg/m .    General appearance: Patient is alert and fully cooperative with history & exam.  No sign of distress is noted during the visit.     Psychiatric: Affect is pleasant & appropriate.  Patient appears motivated to improve health.     Respiratory: Breathing is regular & unlabored while sitting.     HEENT: Hearing is intact to spoken word.  Speech is clear.  No gross evidence of visual impairment that would impact ambulation.     Dermatologic: Skin is intact to right foot without significant lesions, rash or abrasion.  No paronychia or evidence of soft tissue infection is noted.     Vascular: DP & PT pulses are intact & regular on the right.  No significant edema or varicosities noted.  CFT and skin temperature are normal to both lower extremities.     Neurologic: Lower extremity sensation is intact to light touch.  No evidence of weakness or contracture in the lower extremities.  No evidence of neuropathy.     Musculoskeletal: Right first toe pain with palpation over the dorsal interphalangeal joint.  No pain with joint motion but this does seem bit limited.  No first metatarsophalangeal joint pain or limitation.  Patient is ambulatory without assistive device or brace.  No gross ankle deformity noted.  No foot or ankle joint effusion is  noted.     X-rays of right foot reviewed with patient.  Perhaps a subtle dorsal spur noted at the first toe interphalangeal joint on lateral view.    ASSESSMENT:   Right first toe pain, possible osteoarthritis versus inflammatory arthritis.  Type 2 diabetes mellitus.     PLAN:  Reviewed patient's chart in epic.  Discussed condition and treatment options including pros and cons.    Etiology of toe pain not entirely clear.  I suspect some mild osteoarthritis.  X-ray findings are subtle.  We discussed possibility of inflammatory arthritis such as gout.  Recent uric acid was normal.  I advised a stiff soled shoe.  RICE.  We will try different anti-inflammatory today.  Mobic prescribed.  Discontinue diclofenac.  Follow-up in 2 to 3 weeks as needed.  Discussed MRI if not improving.  Patient deferred MRI at this point.     Discussed condition.  Discussed diabetic foot care and prevention.  Check feet daily.      Garland Wei DPM, FACFAS            Again, thank you for allowing me to participate in the care of your patient.        Sincerely,        Garland Wei DPM

## 2021-02-01 NOTE — PATIENT INSTRUCTIONS
"Follow up in 2-3 weeks as needed      PRICE Therapy    Many aches and pains throughout the foot and ankle can be helped with many simple treatments.  This is usually described as PRICE Therapy.      P - Protection - often times, inflammation/pain in the lower extremity is not able to improve simply because the areas involved are never allowed to rest.  Every step we take can bother the problematic area.  Protecting those areas is an important step in the healing process.  This may involve a walking cast boot, a special insert/orthotic device, an ankle brace, or simply avoiding barefoot walking.    R - Rest - in addition to protecting the foot/ankle, resting is an important, but often times difficult, treatment option.  Getting off your feet when they bother you, and specifically avoiding activities that cause pain/discomfort, are very beneficial to prevent, and treat, foot/ankle pain.  \"If there's something that makes it hurt(eg activities, shoe gear), and you keep doing the thing that makes it hurt, it's just going to keep hurting\".      I - Ice - icing regularly can help to decrease inflammation and swelling in the foot, thus decreasing pain.  Using an ice pack or a bag of frozen peas works very well.  Ice for 20 minutes multiple times per day as needed.  Do not place the ice directly on the skin as this can cause tissue damage.    C - Compression - using a compression wrap or an ACE wrap can help to decrease swelling, which can help to decrease pain.  Wearing the wraps is generally not needed at night, but they should be worn on a regular basis when you are going to be on your feet for prolonged periods as gravity tends to pull fluids down to your feet/ankles.    E - Elevation - elevating your lower extremities multiple times daily for 15-20 minutes can help to decrease swelling, which works well in decreasing pain levels.        DIABETES AND YOUR FEET    What effect does diabetes have on the feet?  Diabetes can " "result in several problems in the feet including ulcers (open sores) and amputations.  Two of the most important reasons why people develop foot problems when they have diabetes is :  1.  Neuropathy (loss of feeling) and 2.  Vascular disease (loss or decrease of blood flow).    What is neuropathy?  Neuropathy is a term used to describe a loss of nerve function.  Patients with diabetes are at risk of developing neuropathy if their sugars continue to run high and are above the normal value.  One theory for neuropathy is that the \"extra\" sugar in the body enters the nerves and is broken down.  These by-products build up in the nerve causing it to swell and impair nerve function.  Often times, this can be prevented by controlling your sugars, dieting and exercise.    How do I know if I have neuropathy?  When a person develops neuropathy, they usually begin to feel numbness or tingling in their feet and sometime in their legs.  Other symptoms may include painful burning or hot feet, tingling or feeling like insects or ants are crawling on your feet or legs.  If the diabetes is sever and the sugars run high for long periods of time, neuropathy can also occur in the hands.    What is vascular disease?  Vascular disease is a term used to describe a loss or decrease in circulation (blood flow).  There is a problem in getting blood and oxygen to areas that need it.  Similar to neuropathy, sugars can build up in the walls of the arteries (blood vessels) and cause them to become swollen, thickened and hardened.  This decreases the amount of blood that can go to an area that needs it.  Though this is common in the legs of diabetic patients, it can also affect other arteries (blood vessels) in the body such as in the heart and eyes.    How do I know if I have vascular disease?  In the legs, vascular disease usually results in cramping.  Patients who develop leg cramps after walking the same distance every time (i.e. One block, " half a mile, etc.) need to let their doctors know so that their circulation may be checked.  Cramps causing severe pain in the feet and/or legs while sleeping or cramps that go away when you stand may also be a sign of poor blood circulation.  Occasional cramping in cold weather or on rare occasions with activity may not be due to poor circulation, but you should inform your doctor.    How can these problems be prevented?  The key to prevention is good blood sugar control.  Poor blood sugar control is a big reason many of these problems start.  Physical activity (exercise) is a very good way to help decrease your blood sugars.  Exercise can lower your blood sugar, blood pressure, and cholesterol.  It also reduces your risk for heart disease and stroke, relieves stress, and strengthens your heart, muscles and bones.  In addition, regular activity helps insulin work better, improves your blood circulation, and keeps your joints flexible.  If you're trying to lose weight, a combination of exercise and wise food choices can help you reach your target weight and maintain it.      Diabetic Foot Care Recommendations    The following are recommendations for avoiding serious foot problems or injury    DO'S    1.Wash your feet with lukewarm water and a mild soap and then dry them thoroughly, especially between the toes.      2. Examine your feet daily looking for cuts, corns, blisters, cracks, etc..., especially after wearing new shoes.  Make sure to look between your toes.  If you cannot see the bottom of your feet, set a mirror on the floor and hold your foot over it, or ask a spouse, friend or family member to examine your feet for you.  Contact your doctor immediately if new problems are noted or if sores are not healing.      3.  Immediately apply moisturizer to the tops and bottoms of your feet, avoiding areas between the toes.  Hand lotion (Intensive Care, Ifeoma, Eucerin, Neutrogena, Curel, etc...) is sufficient unless  your doctor prescribes a medicated lotion.  Apply sunscreen to your feet when going swimming outside.      4.Use clean comfortable shoes, wear white socks (if you have any bleeding or drainage, you will see it on white socks).  Socks should not have thick seams or cut off the circulation around the leg.  Break in new shoes slowly and rotate with older shoes until broken in.  Check the inside of your shoes with your hand to look for areas of irritation or objects that may have fallen into your shoes.        5. Keep slippers by the side of your bed for use during the night.      6. Shoes should be fitted by a professional and should not cause areas of irritation.  Check your feet regularly when wearing a new pair of shoes and replace them as needed.      7.  Talk to your doctor about proper exercise.  Exercise and stretching stimulate blood flow to your feet and maintain proper glucose levels.      8.  Monitor your blood glucose level as instructed by your doctor.  Notify your doctor immediately if your blood sugar is abnormally high or low.      9.  Cut your nails straight across, but then gently round any sharp edges with a cardboard nail file.  If you have neuropathy, peripheral vascular disease or cannot see that well to trim your own toenails, see a medical professional for care.    DON'Ts    1.  Do not soak your feet if you have an open sore.  Use only lukewarm water and always check the temperature with your hand as hot water can easily burn your feet.        2.  Never use a hot water bottle or heating pad on your feet.  Also do not apply cold compresses to your feet.  With decreased sensation, you could burn or freeze your feet.        3.  Do not apply any of these to your feet:     - over the counter medicine for corns or warts     -  Harsh chemicals like boric acid     -  Do not self-treat corns, cuts, blisters or infections.  Always consult your doctor.        4.  Do not wear sandals, slippers or walk  barefoot, especially on hot sand or concrete or other harsh surfaces.      5.  If you smoke, stop!!!

## 2021-04-20 DIAGNOSIS — H40.1131 PRIMARY OPEN ANGLE GLAUCOMA OF BOTH EYES, MILD STAGE: ICD-10-CM

## 2021-04-20 RX ORDER — LATANOPROST 50 UG/ML
1 SOLUTION/ DROPS OPHTHALMIC AT BEDTIME
Qty: 2.5 ML | Refills: 1 | Status: SHIPPED | OUTPATIENT
Start: 2021-04-20 | End: 2021-05-18

## 2021-04-20 NOTE — TELEPHONE ENCOUNTER
Received refill request for Latanoprost. Will refill x 1, patient overdue for Oct appointment. Will tell him to make appt.

## 2021-04-21 DIAGNOSIS — E11.9 TYPE 2 DIABETES MELLITUS WITHOUT COMPLICATION, WITHOUT LONG-TERM CURRENT USE OF INSULIN (H): ICD-10-CM

## 2021-04-21 RX ORDER — METFORMIN HCL 500 MG
2000 TABLET, EXTENDED RELEASE 24 HR ORAL
Qty: 360 TABLET | Refills: 0 | Status: SHIPPED | OUTPATIENT
Start: 2021-04-21 | End: 2021-12-20

## 2021-04-21 NOTE — TELEPHONE ENCOUNTER
Prescription approved per [facility] Refill Protocol.  APPT NEEDED FOR FURTHER REFILLS  Mckayla refill given per RN protocol.   Due for office visit  Pharmacy note to inform pt of office visit needed for continued medication use  Renate Alvarez RN

## 2021-05-11 DIAGNOSIS — N52.1 ERECTILE DYSFUNCTION DUE TO DISEASES CLASSIFIED ELSEWHERE: ICD-10-CM

## 2021-05-11 RX ORDER — SILDENAFIL 100 MG/1
50-100 TABLET, FILM COATED ORAL DAILY PRN
Qty: 8 TABLET | Refills: 1 | Status: SHIPPED | OUTPATIENT
Start: 2021-05-11 | End: 2021-12-20

## 2021-05-18 DIAGNOSIS — H40.1131 PRIMARY OPEN ANGLE GLAUCOMA OF BOTH EYES, MILD STAGE: ICD-10-CM

## 2021-05-18 RX ORDER — LATANOPROST 50 UG/ML
1 SOLUTION/ DROPS OPHTHALMIC AT BEDTIME
Qty: 2.5 ML | Refills: 1 | Status: SHIPPED | OUTPATIENT
Start: 2021-05-18 | End: 2021-06-07

## 2021-06-06 ENCOUNTER — HEALTH MAINTENANCE LETTER (OUTPATIENT)
Age: 61
End: 2021-06-06

## 2021-06-28 ENCOUNTER — OFFICE VISIT (OUTPATIENT)
Dept: OPHTHALMOLOGY | Facility: CLINIC | Age: 61
End: 2021-06-28
Payer: COMMERCIAL

## 2021-06-28 DIAGNOSIS — H40.1131 PRIMARY OPEN ANGLE GLAUCOMA OF BOTH EYES, MILD STAGE: Primary | ICD-10-CM

## 2021-06-28 DIAGNOSIS — H25.13 NUCLEAR SCLEROTIC CATARACT OF BOTH EYES: ICD-10-CM

## 2021-06-28 PROCEDURE — 92133 CPTRZD OPH DX IMG PST SGM ON: CPT | Performed by: STUDENT IN AN ORGANIZED HEALTH CARE EDUCATION/TRAINING PROGRAM

## 2021-06-28 PROCEDURE — 92012 INTRM OPH EXAM EST PATIENT: CPT | Performed by: STUDENT IN AN ORGANIZED HEALTH CARE EDUCATION/TRAINING PROGRAM

## 2021-06-28 PROCEDURE — 92083 EXTENDED VISUAL FIELD XM: CPT | Performed by: STUDENT IN AN ORGANIZED HEALTH CARE EDUCATION/TRAINING PROGRAM

## 2021-06-28 ASSESSMENT — VISUAL ACUITY
OS_CC: 20/50-2
OD_CC: 20/40+3
METHOD: SNELLEN - LINEAR
OD_PH_CC: 20/NI
OS_PH_CC: 20/40

## 2021-06-28 ASSESSMENT — SLIT LAMP EXAM - LIDS
COMMENTS: NORMAL
COMMENTS: NORMAL

## 2021-06-28 ASSESSMENT — EXTERNAL EXAM - LEFT EYE: OS_EXAM: NORMAL

## 2021-06-28 ASSESSMENT — EXTERNAL EXAM - RIGHT EYE: OD_EXAM: NORMAL

## 2021-06-28 ASSESSMENT — TONOMETRY
OD_IOP_MMHG: 23
IOP_METHOD: APPLANATION
OS_IOP_MMHG: 24

## 2021-06-28 ASSESSMENT — CUP TO DISC RATIO
OS_RATIO: 0.7
OD_RATIO: 0.65

## 2021-06-28 NOTE — PATIENT INSTRUCTIONS
Continue Latanoprost (green top) once a day (ok in the morning) both eyes     Katherine Danielson MD  [phone]

## 2021-06-28 NOTE — PROGRESS NOTES
Current Eye Medications:  Latanoprost nightly , last drops at 5 days ago, pt reports often forgets to take drops     Subjective:  IOP, OCT and HVF( pt last seen 1 yr ago)  Pt reports that he sees ok, however would like to see better than does.     Objective:  See Ophthalmology Exam.       Assessment:  Ren Farrar is a 61 year old male who presents with:   Encounter Diagnoses   Name Primary?     Primary open angle glaucoma of both eyes, mild stage; s/p SLT #1 OD 8/2018 Intraocular pressure 23/24 today - has been off drops for about 5 days now. Asked him to change the timing of his drops to taking them once a day in the morning to see if that helps with compliance. If not, then repeat Selected laser trabeculoplasty (SLT) (has had once in right eye with nice result).    OCT optic nerve: avg retinal nerve fiber layer 75/75; thin temp right eye; borderline temp left eye.     Segal visual field (HVF) 24-2: mild inf arcuate defect right eye; mild S&I arcuate defect left eye.      Nuclear sclerotic cataract of both eyes        Plan:  Continue Latanoprost (green top) once a day (ok in the morning) both eyes     Katherine Danielson MD  [phone]

## 2021-06-28 NOTE — LETTER
6/28/2021         RE: Ren Farrar  [address]  [place] [address]        Dear Colleague,    Thank you for referring your patient, Ren Farrar, to the [facility]. Please see a copy of my visit note below.     Current Eye Medications:  Latanoprost nightly , last drops at 5 days ago, pt reports often forgets to take drops     Subjective:  IOP, OCT and HVF( pt last seen 1 yr ago)  Pt reports that he sees ok, however would like to see better than does.     Objective:  See Ophthalmology Exam.       Assessment:  Ren Farrar is a 61 year old male who presents with:   Encounter Diagnoses   Name Primary?     Primary open angle glaucoma of both eyes, mild stage; s/p SLT #1 OD 8/2018 Intraocular pressure 23/24 today - has been off drops for about 5 days now. Asked him to change the timing of his drops to taking them once a day in the morning to see if that helps with compliance. If not, then repeat Selected laser trabeculoplasty (SLT) (has had once in right eye with nice result).    OCT optic nerve: avg retinal nerve fiber layer 75/75; thin temp right eye; borderline temp left eye.     Segal visual field (HVF) 24-2: mild inf arcuate defect right eye; mild S&I arcuate defect left eye.      Nuclear sclerotic cataract of both eyes        Plan:  Continue Latanoprost (green top) once a day (ok in the morning) both eyes     Katherine Danielson MD  [phone]          Again, thank you for allowing me to participate in the care of your patient.        Sincerely,        Katherine Danielson MD

## 2021-07-21 DIAGNOSIS — E78.5 HYPERLIPIDEMIA LDL GOAL <100: ICD-10-CM

## 2021-07-21 DIAGNOSIS — I10 ESSENTIAL HYPERTENSION WITH GOAL BLOOD PRESSURE LESS THAN 140/90: ICD-10-CM

## 2021-07-21 RX ORDER — AMLODIPINE AND BENAZEPRIL HYDROCHLORIDE 5; 20 MG/1; MG/1
1 CAPSULE ORAL DAILY
Qty: 90 CAPSULE | Refills: 2 | Status: SHIPPED | OUTPATIENT
Start: 2021-07-21 | End: 2021-12-20

## 2021-07-21 RX ORDER — NIACIN 500 MG/1
2000 TABLET, EXTENDED RELEASE ORAL AT BEDTIME
Qty: 360 TABLET | Refills: 0 | Status: SHIPPED | OUTPATIENT
Start: 2021-07-21 | End: 2021-10-22

## 2021-07-30 DIAGNOSIS — E11.65 TYPE 2 DIABETES MELLITUS WITH HYPERGLYCEMIA, WITHOUT LONG-TERM CURRENT USE OF INSULIN (H): ICD-10-CM

## 2021-07-30 NOTE — TELEPHONE ENCOUNTER
Routing refill request to provider for review/approval because:  Labs not current:  A1C  Lab Test 01/25/21  1204   A1C 7.7*     Patient is due for 6 month follow-up.       Celena Medeiros RN  [facility]

## 2021-08-01 ENCOUNTER — HEALTH MAINTENANCE LETTER (OUTPATIENT)
Age: 61
End: 2021-08-01

## 2021-08-10 ENCOUNTER — DOCUMENTATION ONLY (OUTPATIENT)
Dept: LAB | Facility: CLINIC | Age: 61
End: 2021-08-10

## 2021-08-10 DIAGNOSIS — E11.9 TYPE 2 DIABETES MELLITUS WITHOUT COMPLICATION, WITHOUT LONG-TERM CURRENT USE OF INSULIN (H): Primary | ICD-10-CM

## 2021-08-10 NOTE — PROGRESS NOTES
Not sure if Ren is wanting labs collected.  There is a note to have his provider check his results and order refills for him on the appt notes.  Please place orders if needed or contact pt with further info.    'I will want my MD to Check review my test result and call in my prescription for Trulicity to [facility]'

## 2021-08-11 ENCOUNTER — LAB (OUTPATIENT)
Dept: LAB | Facility: CLINIC | Age: 61
End: 2021-08-11
Payer: COMMERCIAL

## 2021-08-11 DIAGNOSIS — E11.9 TYPE 2 DIABETES MELLITUS WITHOUT COMPLICATION, WITHOUT LONG-TERM CURRENT USE OF INSULIN (H): ICD-10-CM

## 2021-08-11 DIAGNOSIS — Z00.00 ROUTINE GENERAL MEDICAL EXAMINATION AT A HEALTH CARE FACILITY: Primary | ICD-10-CM

## 2021-08-11 LAB
HOLD SPECIMEN: NORMAL
HOLD SPECIMEN: NORMAL

## 2021-08-11 PROCEDURE — 84460 ALANINE AMINO (ALT) (SGPT): CPT

## 2021-08-11 PROCEDURE — 80048 BASIC METABOLIC PNL TOTAL CA: CPT

## 2021-08-11 PROCEDURE — 80061 LIPID PANEL: CPT

## 2021-08-11 PROCEDURE — 83036 HEMOGLOBIN GLYCOSYLATED A1C: CPT

## 2021-08-11 PROCEDURE — 36415 COLL VENOUS BLD VENIPUNCTURE: CPT

## 2021-08-12 LAB
ALT SERPL W P-5'-P-CCNC: 25 U/L (ref 0–70)
ANION GAP SERPL CALCULATED.3IONS-SCNC: 6 MMOL/L (ref 3–14)
BUN SERPL-MCNC: 16 MG/DL (ref 7–30)
CALCIUM SERPL-MCNC: 9.1 MG/DL (ref 8.5–10.1)
CHLORIDE BLD-SCNC: 103 MMOL/L (ref 94–109)
CHOLEST SERPL-MCNC: 184 MG/DL
CO2 SERPL-SCNC: 29 MMOL/L (ref 20–32)
CREAT SERPL-MCNC: 1.02 MG/DL (ref 0.66–1.25)
FASTING STATUS PATIENT QL REPORTED: NO
GFR SERPL CREATININE-BSD FRML MDRD: 78 ML/MIN/1.73M2
GLUCOSE BLD-MCNC: 150 MG/DL (ref 70–99)
HBA1C MFR BLD: 9.2 % (ref 0–5.6)
HDLC SERPL-MCNC: 72 MG/DL
LDLC SERPL CALC-MCNC: 96 MG/DL
NONHDLC SERPL-MCNC: 112 MG/DL
POTASSIUM BLD-SCNC: 3.9 MMOL/L (ref 3.4–5.3)
SODIUM SERPL-SCNC: 138 MMOL/L (ref 133–144)
TRIGL SERPL-MCNC: 79 MG/DL

## 2021-08-12 RX ORDER — DULAGLUTIDE 0.75 MG/.5ML
0.75 INJECTION, SOLUTION SUBCUTANEOUS
Qty: 2 ML | Refills: 0 | Status: SHIPPED | OUTPATIENT
Start: 2021-08-12 | End: 2021-11-24

## 2021-08-12 NOTE — TELEPHONE ENCOUNTER
The patient came in for a lab appointment, but he only had his hemoglobin A1c checked.  He is overdue for a physical as well as a recheck of other labs (urine microalbumin, lipids, etc.).  Please schedule him within 1 month.

## 2021-08-16 NOTE — TELEPHONE ENCOUNTER
Pt informed me he has a lapse of insurance. He will not have insurance again until October or November. He said he could still come in, just wants to make sure the visit is not thousands of dollars.    Pt has been scheduled, per pt, on 9/3 at 11:40am.    Thank you,   Brandi Hirsch, Patient Representative

## 2021-08-19 NOTE — TELEPHONE ENCOUNTER
I cannot speak to the actual cost of the office visit, but I am pretty sure it is less than $1000.  (Someone else in the organization can clarify that with the patient if necessary).  There would not be any additional charges beyond the OV, since he has already had his labs done.

## 2021-08-24 ENCOUNTER — MYC MEDICAL ADVICE (OUTPATIENT)
Dept: FAMILY MEDICINE | Facility: CLINIC | Age: 61
End: 2021-08-24

## 2021-09-01 DIAGNOSIS — H40.1131 PRIMARY OPEN ANGLE GLAUCOMA OF BOTH EYES, MILD STAGE: ICD-10-CM

## 2021-09-01 RX ORDER — LATANOPROST 50 UG/ML
1 SOLUTION/ DROPS OPHTHALMIC AT BEDTIME
Qty: 7.5 ML | Refills: 0 | Status: SHIPPED | OUTPATIENT
Start: 2021-09-01 | End: 2021-11-18

## 2021-09-01 NOTE — TELEPHONE ENCOUNTER
From Last visit:        Return in about 3 months (around 9/28/2021) for Complete Exam (CE).  Continue Latanoprost (green top) once a day (ok in the morning) both eyes         Will refill 7.5 mL of Latanoprost and have him call for CE for September.  He is due for this appointment per last note.   Routing Rx to Dr. Danielson to sign and send.

## 2021-09-03 NOTE — TELEPHONE ENCOUNTER
The patient did not show up for his appointment. Please advise him that managing medication changes in the situation is too complicated to do in a MyChart message (needs a visit). Until he is insured, he needs to do the best he can with the medications he can afford, diet changes, and weight loss. Reschedule him if possible.

## 2021-09-03 NOTE — TELEPHONE ENCOUNTER
Called and left a voicemail message to return our call to schedule an office visit.  Jillian Jamil MA  [facility]  2nd Floor  Primary Care

## 2021-09-26 ENCOUNTER — HEALTH MAINTENANCE LETTER (OUTPATIENT)
Age: 61
End: 2021-09-26

## 2021-10-24 DIAGNOSIS — E11.65 TYPE 2 DIABETES MELLITUS WITH HYPERGLYCEMIA, WITHOUT LONG-TERM CURRENT USE OF INSULIN (H): Primary | ICD-10-CM

## 2021-10-24 DIAGNOSIS — I10 ESSENTIAL HYPERTENSION WITH GOAL BLOOD PRESSURE LESS THAN 140/90: ICD-10-CM

## 2021-10-24 DIAGNOSIS — R60.9 DEPENDENT EDEMA: ICD-10-CM

## 2021-10-26 NOTE — TELEPHONE ENCOUNTER
Last office visit 9/3/21, patient no showed appointment.  Last office visit 1/25/21.  Health Maintenance Due   Topic Date Due     ANNUAL REVIEW OF HM ORDERS  Never done     MICROALBUMIN  10/24/2020     PHQ-2  01/01/2021     COLORECTAL CANCER SCREENING  03/03/2021     PREVENTIVE CARE VISIT  04/07/2021     EYE EXAM  07/10/2021     DIABETIC FOOT EXAM  09/01/2021     INFLUENZA VACCINE (1) 09/01/2021     Renate Alvarez RN

## 2021-10-31 RX ORDER — HYDROCHLOROTHIAZIDE 12.5 MG/1
25 TABLET ORAL DAILY
Qty: 180 TABLET | Refills: 0 | Status: SHIPPED | OUTPATIENT
Start: 2021-10-31 | End: 2021-12-20

## 2021-11-18 ENCOUNTER — OFFICE VISIT (OUTPATIENT)
Dept: OPHTHALMOLOGY | Facility: CLINIC | Age: 61
End: 2021-11-18
Payer: COMMERCIAL

## 2021-11-18 DIAGNOSIS — H52.03 HYPEROPIA OF BOTH EYES WITH REGULAR ASTIGMATISM AND PRESBYOPIA: ICD-10-CM

## 2021-11-18 DIAGNOSIS — H25.813 COMBINED FORMS OF AGE-RELATED CATARACT OF BOTH EYES: ICD-10-CM

## 2021-11-18 DIAGNOSIS — Z01.00 EXAMINATION OF EYES AND VISION: ICD-10-CM

## 2021-11-18 DIAGNOSIS — H40.1131 PRIMARY OPEN ANGLE GLAUCOMA OF BOTH EYES, MILD STAGE: ICD-10-CM

## 2021-11-18 DIAGNOSIS — E11.9 TYPE 2 DIABETES MELLITUS WITHOUT COMPLICATION, WITHOUT LONG-TERM CURRENT USE OF INSULIN (H): Primary | ICD-10-CM

## 2021-11-18 DIAGNOSIS — H52.223 HYPEROPIA OF BOTH EYES WITH REGULAR ASTIGMATISM AND PRESBYOPIA: ICD-10-CM

## 2021-11-18 DIAGNOSIS — H52.4 HYPEROPIA OF BOTH EYES WITH REGULAR ASTIGMATISM AND PRESBYOPIA: ICD-10-CM

## 2021-11-18 PROCEDURE — 92014 COMPRE OPH EXAM EST PT 1/>: CPT | Performed by: STUDENT IN AN ORGANIZED HEALTH CARE EDUCATION/TRAINING PROGRAM

## 2021-11-18 PROCEDURE — 92015 DETERMINE REFRACTIVE STATE: CPT | Performed by: STUDENT IN AN ORGANIZED HEALTH CARE EDUCATION/TRAINING PROGRAM

## 2021-11-18 RX ORDER — LATANOPROST 50 UG/ML
1 SOLUTION/ DROPS OPHTHALMIC AT BEDTIME
Qty: 7.5 ML | Refills: 4 | Status: SHIPPED | OUTPATIENT
Start: 2021-11-18 | End: 2022-02-22

## 2021-11-18 ASSESSMENT — VISUAL ACUITY
METHOD: SNELLEN - LINEAR
OD_CC: 20/25
CORRECTION_TYPE: GLASSES
OS_CC+: -1
OS_CC: 20/50
OS_PH_CC: 20/30
OS_PH_CC+: -1
OD_CC+: -2

## 2021-11-18 ASSESSMENT — REFRACTION_WEARINGRX
OS_AXIS: 168
OS_SPHERE: +1.25
OS_CYLINDER: +1.50
OS_ADD: +2.75
SPECS_TYPE: PAL
OD_SPHERE: +1.00
OD_ADD: +2.75
OD_CYLINDER: +1.50
OD_AXIS: 168

## 2021-11-18 ASSESSMENT — REFRACTION_MANIFEST
OS_ADD: +2.50
OS_AXIS: 160
OD_AXIS: 159
OD_CYLINDER: +1.25
OS_CYLINDER: +1.25
OD_ADD: +2.50
OS_SPHERE: +1.50
OD_SPHERE: +1.25

## 2021-11-18 ASSESSMENT — CONF VISUAL FIELD
OS_INFERIOR_NASAL_RESTRICTION: 3
OD_NORMAL: 1

## 2021-11-18 ASSESSMENT — TONOMETRY
OS_IOP_MMHG: 18
IOP_METHOD: APPLANATION
OD_IOP_MMHG: 18

## 2021-11-18 ASSESSMENT — EXTERNAL EXAM - LEFT EYE: OS_EXAM: NORMAL

## 2021-11-18 ASSESSMENT — EXTERNAL EXAM - RIGHT EYE: OD_EXAM: NORMAL

## 2021-11-18 ASSESSMENT — CUP TO DISC RATIO
OS_RATIO: 0.7
OD_RATIO: 0.65

## 2021-11-18 ASSESSMENT — SLIT LAMP EXAM - LIDS
COMMENTS: NORMAL
COMMENTS: NORMAL

## 2021-11-18 NOTE — PATIENT INSTRUCTIONS
Continue Latanoprost (green top) at bedtime both eyes      Glasses prescription given     Keep blood sugars and blood pressure under good control.    Katherine Danielson MD  [phone]    Patient Education   Diabetes weakens the blood vessels all over the body, including the eyes. Damage to the blood vessels in the eyes can cause swelling or bleeding into part of the eye (called the retina). This is called diabetic retinopathy ([facility]-tin[facility]puh-thee). If not treated, this disease can cause vision loss or blindness.   Symptoms may include blurred or distorted vision, but many people have no symptoms. It's important to see your eye doctor regularly to check for problems.   Early treatment and good control can help protect your vision. Here are the things you can do to help prevent vision loss:      1. Keep your blood sugar levels under tight control.      2. Bring high blood pressure under control.      3. No smoking.      4. Have yearly dilated eye exams.

## 2021-11-18 NOTE — LETTER
2021         RE: Ren Farrar  [address]  [place] [address]        Dear Colleague,    Thank you for referring your patient, Ren Farrar, to the [facility]. Please see a copy of my visit note below.     Current Eye Medications:  Latanoprost once a day (ok in the morning or not but not twice a day) both eyes      Subjective:  Here for diabetic/complete eye exam. DM, last a1c was 9.2 on 21. Is going on plant based diet soon and is trying to get his sugars down. He got frustrated and stopped dieting for awhile and didn't have insurance. He is a carbon copy of his father and he  from DM. Eyesight is not as good as it used to be myrtle left eye.    Lab Results   Component Value Date    A1C 9.2 2021    A1C 7.7 2021    A1C 8.1 2020    A1C 8.0 2020    A1C 7.3 10/24/2019    A1C 7.5 03/15/2019      Objective:  See Ophthalmology Exam.      Assessment:  Ren Farrar is a 61 year old male who presents with:   Encounter Diagnoses   Name Primary?     Examination of eyes and vision        Type 2 diabetes mellitus without complication, without long-term current use of insulin (H)   Negative diabetic retinopathy      Primary open angle glaucoma of both eyes, mild stage; s/p SLT #1 OD 2018   Intraocular pressure 18/18 today.      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    Katherine Danielson MD  [phone]                          Again, thank you for allowing me to participate in the care of your patient.        Sincerely,        Katherine Danielson MD

## 2021-11-18 NOTE — PROGRESS NOTES
Current Eye Medications:  Latanoprost once a day (ok in the morning or not but not twice a day) both eyes      Subjective:  Here for diabetic/complete eye exam. DM, last a1c was 9.2 on 21. Is going on plant based diet soon and is trying to get his sugars down. He got frustrated and stopped dieting for awhile and didn't have insurance. He is a carbon copy of his father and he  from DM. Eyesight is not as good as it used to be myrtle left eye.    Lab Results   Component Value Date    A1C 9.2 2021    A1C 7.7 2021    A1C 8.1 2020    A1C 8.0 2020    A1C 7.3 10/24/2019    A1C 7.5 03/15/2019      Objective:  See Ophthalmology Exam.      Assessment:  Ren Farrar is a 61 year old male who presents with:   Encounter Diagnoses   Name Primary?     Examination of eyes and vision        Type 2 diabetes mellitus without complication, without long-term current use of insulin (H)   Negative diabetic retinopathy      Primary open angle glaucoma of both eyes, mild stage; s/p SLT #1 OD 2018   Intraocular pressure 18/18 today.      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    Katherine Danielson MD  [phone]

## 2021-12-20 ENCOUNTER — OFFICE VISIT (OUTPATIENT)
Dept: FAMILY MEDICINE | Facility: CLINIC | Age: 61
End: 2021-12-20
Payer: COMMERCIAL

## 2021-12-20 VITALS
DIASTOLIC BLOOD PRESSURE: 75 MMHG | OXYGEN SATURATION: 96 % | TEMPERATURE: 97.6 F | BODY MASS INDEX: 31.29 KG/M2 | WEIGHT: 231 LBS | HEART RATE: 95 BPM | SYSTOLIC BLOOD PRESSURE: 117 MMHG | HEIGHT: 72 IN

## 2021-12-20 DIAGNOSIS — N52.1 ERECTILE DYSFUNCTION DUE TO DISEASES CLASSIFIED ELSEWHERE: ICD-10-CM

## 2021-12-20 DIAGNOSIS — E11.65 TYPE 2 DIABETES MELLITUS WITH HYPERGLYCEMIA, WITHOUT LONG-TERM CURRENT USE OF INSULIN (H): Primary | ICD-10-CM

## 2021-12-20 DIAGNOSIS — Z12.11 SCREEN FOR COLON CANCER: ICD-10-CM

## 2021-12-20 DIAGNOSIS — I10 ESSENTIAL HYPERTENSION WITH GOAL BLOOD PRESSURE LESS THAN 140/90: ICD-10-CM

## 2021-12-20 DIAGNOSIS — Z00.00 LABORATORY EXAMINATION ORDERED AS PART OF A ROUTINE GENERAL MEDICAL EXAMINATION: ICD-10-CM

## 2021-12-20 DIAGNOSIS — E11.9 TYPE 2 DIABETES MELLITUS WITHOUT COMPLICATION, WITHOUT LONG-TERM CURRENT USE OF INSULIN (H): Primary | ICD-10-CM

## 2021-12-20 DIAGNOSIS — Z23 NEED FOR VACCINATION: ICD-10-CM

## 2021-12-20 DIAGNOSIS — R60.9 DEPENDENT EDEMA: ICD-10-CM

## 2021-12-20 LAB
CREAT SERPL-MCNC: 1.01 MG/DL (ref 0.66–1.25)
ERYTHROCYTE [DISTWIDTH] IN BLOOD BY AUTOMATED COUNT: 13.6 % (ref 10–15)
ERYTHROCYTE [SEDIMENTATION RATE] IN BLOOD BY WESTERGREN METHOD: 14 MM/HR (ref 0–20)
GFR SERPL CREATININE-BSD FRML MDRD: 79 ML/MIN/1.73M2
GLUCOSE BLD-MCNC: 121 MG/DL (ref 60–99)
HBA1C MFR BLD: 7.7 % (ref 0–5.6)
HCT VFR BLD AUTO: 38.7 % (ref 40–53)
HGB BLD-MCNC: 12.9 G/DL (ref 13.3–17.7)
HOLD SPECIMEN: NORMAL
MCH RBC QN AUTO: 29.1 PG (ref 26.5–33)
MCHC RBC AUTO-ENTMCNC: 33.3 G/DL (ref 31.5–36.5)
MCV RBC AUTO: 87 FL (ref 78–100)
PLATELET # BLD AUTO: 200 10E3/UL (ref 150–450)
POTASSIUM BLD-SCNC: 3.5 MMOL/L (ref 3.4–5.3)
RBC # BLD AUTO: 4.44 10E6/UL (ref 4.4–5.9)
WBC # BLD AUTO: 6.2 10E3/UL (ref 4–11)

## 2021-12-20 PROCEDURE — 99214 OFFICE O/P EST MOD 30 MIN: CPT | Mod: 25 | Performed by: FAMILY MEDICINE

## 2021-12-20 PROCEDURE — 82947 ASSAY GLUCOSE BLOOD QUANT: CPT | Performed by: FAMILY MEDICINE

## 2021-12-20 PROCEDURE — 82565 ASSAY OF CREATININE: CPT | Performed by: FAMILY MEDICINE

## 2021-12-20 PROCEDURE — 85652 RBC SED RATE AUTOMATED: CPT | Performed by: FAMILY MEDICINE

## 2021-12-20 PROCEDURE — 90471 IMMUNIZATION ADMIN: CPT | Performed by: FAMILY MEDICINE

## 2021-12-20 PROCEDURE — 91306 COVID-19,PF,MODERNA (18+ YRS BOOSTER .25ML): CPT | Performed by: FAMILY MEDICINE

## 2021-12-20 PROCEDURE — 84132 ASSAY OF SERUM POTASSIUM: CPT | Performed by: FAMILY MEDICINE

## 2021-12-20 PROCEDURE — 99207 PR FOOT EXAM NO CHARGE: CPT | Performed by: FAMILY MEDICINE

## 2021-12-20 PROCEDURE — 83036 HEMOGLOBIN GLYCOSYLATED A1C: CPT | Performed by: FAMILY MEDICINE

## 2021-12-20 PROCEDURE — 85027 COMPLETE CBC AUTOMATED: CPT | Performed by: FAMILY MEDICINE

## 2021-12-20 PROCEDURE — 0064A COVID-19,PF,MODERNA (18+ YRS BOOSTER .25ML): CPT | Performed by: FAMILY MEDICINE

## 2021-12-20 PROCEDURE — 90682 RIV4 VACC RECOMBINANT DNA IM: CPT | Performed by: FAMILY MEDICINE

## 2021-12-20 PROCEDURE — 36415 COLL VENOUS BLD VENIPUNCTURE: CPT | Performed by: FAMILY MEDICINE

## 2021-12-20 RX ORDER — DULAGLUTIDE 0.75 MG/.5ML
0.75 INJECTION, SOLUTION SUBCUTANEOUS
Qty: 6 ML | Refills: 1 | Status: SHIPPED | OUTPATIENT
Start: 2021-12-20 | End: 2022-06-16

## 2021-12-20 RX ORDER — AMLODIPINE AND BENAZEPRIL HYDROCHLORIDE 5; 20 MG/1; MG/1
1 CAPSULE ORAL DAILY
Qty: 90 CAPSULE | Refills: 1 | Status: SHIPPED | OUTPATIENT
Start: 2021-12-20 | End: 2022-07-14

## 2021-12-20 RX ORDER — METFORMIN HCL 500 MG
2000 TABLET, EXTENDED RELEASE 24 HR ORAL
Qty: 360 TABLET | Refills: 1 | Status: SHIPPED | OUTPATIENT
Start: 2021-12-20 | End: 2022-06-15

## 2021-12-20 RX ORDER — HYDROCHLOROTHIAZIDE 12.5 MG/1
25 TABLET ORAL DAILY
Qty: 180 TABLET | Refills: 1 | Status: SHIPPED | OUTPATIENT
Start: 2021-12-20 | End: 2022-06-15

## 2021-12-20 RX ORDER — TADALAFIL 5 MG/1
5 TABLET ORAL EVERY 24 HOURS
Qty: 30 TABLET | Refills: 5 | Status: SHIPPED | OUTPATIENT
Start: 2021-12-20 | End: 2022-11-21

## 2021-12-20 ASSESSMENT — PAIN SCALES - GENERAL: PAINLEVEL: NO PAIN (0)

## 2021-12-20 ASSESSMENT — MIFFLIN-ST. JEOR: SCORE: 1876.84

## 2021-12-20 NOTE — PROGRESS NOTES
Assessment & Plan     (E11.9) Type 2 diabetes mellitus without complication, without long-term current use of insulin (H)  (primary encounter diagnosis)  Comment: Significantly improved since last visit  Plan: FOOT EXAM, metFORMIN (GLUCOPHAGE-XR) 500 MG 24         hr tablet, dulaglutide (TRULICITY) 0.75         MG/0.5ML pen, Albumin Random Urine Quantitative        with Creat Ratio         Return in about 6 months (around 6/20/2022) for full physical, diabetes.      (I10) Essential hypertension with goal blood pressure less than 140/90  Comment: Well-controlled  Plan: amLODIPine-benazepril (LOTREL) 5-20 MG capsule,        hydrochlorothiazide (HYDRODIURIL) 12.5 MG         tablet, CBC with platelets, ESR: Erythrocyte         sedimentation rate        Return in 6 months    (R60.9) Dependent edema  Comment:   Plan: hydrochlorothiazide (HYDRODIURIL) 12.5 MG         tablet            (Z12.11) Screen for colon cancer  Comment:   Plan: Adult Gastro Ref - Procedure Only            (Z23) Need for vaccination  Comment:   Plan: COVID-19,PF,MODERNA (18+ YRS BOOSTER .25ML),         INFLUENZA QUAD, RECOMBINANT, P-FREE (RIV4)         (FLUBLOK)            (N52.1) Erectile dysfunction due to diseases classified elsewhere  Comment: Patient is somewhat dissatisfied with previously prescribed oral medications.  He has the option to see the urologist.  Plan: Adult Urology Referral, CBC with platelets,         ESR: Erythrocyte sedimentation rate, tadalafil         (CIALIS) 5 MG tablet                Return in about 6 months (around 6/20/2022) for full physical, diabetes.    Humberto Humphrey MD  [facility]    Richard Mcclure is a 61 year old who presents for the following health issues     HPI     Diabetes Follow-up    How often are you checking your blood sugar? Two times daily  Blood sugar testing frequency justification:  Risk of hypoglycemia with medication(s)  What time of day are you checking your  "blood sugars (select all that apply)?  Before and after meals       Do you have any of these symptoms? (Select all that apply)  No numbness or tingling in feet.  No redness, sores or blisters on feet.  No complaints of excessive thirst.  No reports of blurry vision.  No significant changes to weight.      BP Readings from Last 2 Encounters:   02/10/22 122/79   12/20/21 117/75     Hemoglobin A1C POCT (%)   Date Value   01/25/2021 7.7 (H)   09/01/2020 8.1 (H)     Hemoglobin A1C (%)   Date Value   12/20/2021 7.7 (H)   08/11/2021 9.2 (H)     LDL Cholesterol Calculated (mg/dL)   Date Value   08/11/2021 96   09/01/2020 84   04/02/2020 82                   Review of Systems         Objective    /75 (BP Location: Left arm, Patient Position: Chair, Cuff Size: Adult Regular)   Pulse 95   Temp 97.6  F (36.4  C) (Tympanic)   Ht 1.822 m (5' 11.75\")   Wt 104.8 kg (231 lb)   SpO2 96%   BMI 31.55 kg/m    Body mass index is 31.55 kg/m .  Physical Exam   GENERAL: healthy, alert and no distress  EYES: Eyes grossly normal to inspection, PERRL, EOMI, sclerae white and conjunctivae normal  RESP: lungs clear to auscultation - no crackles or wheezes, no areas of dullness, no tachypnea  CV: Heart regular rate and rhythm without murmur, click or rub. No peripheral edema and peripheral pulses strong  MS: no gross musculoskeletal defects noted, no edema  SKIN: no suspicious lesions or rashes to visible skin  NEURO: Normal strength and tone, sensory exam grossly normal, mentation intact, oriented times 3 and cranial nerves 2-12 intact  PSYCH: mentation appears normal, affect normal/bright   Diabetic foot exam: normal DP and PT pulses, no trophic changes or ulcerative lesions, normal sensory exam and normal monofilament exam      Lab Results   Component Value Date    A1C 7.7 12/20/2021    A1C 9.2 08/11/2021    A1C 7.7 01/25/2021    A1C 8.1 09/01/2020    A1C 8.0 04/02/2020    A1C 7.3 10/24/2019    A1C 7.5 03/15/2019                "

## 2022-01-05 ENCOUNTER — TELEPHONE (OUTPATIENT)
Dept: UROLOGY | Facility: CLINIC | Age: 62
End: 2022-01-05

## 2022-01-05 ENCOUNTER — OFFICE VISIT (OUTPATIENT)
Dept: UROLOGY | Facility: CLINIC | Age: 62
End: 2022-01-05
Attending: FAMILY MEDICINE
Payer: COMMERCIAL

## 2022-01-05 DIAGNOSIS — N52.1 ERECTILE DYSFUNCTION DUE TO DISEASES CLASSIFIED ELSEWHERE: ICD-10-CM

## 2022-01-05 LAB
LH SERPL-ACNC: 7.6 IU/L (ref 1.5–9.3)
PROLACTIN SERPL-MCNC: 11 UG/L (ref 2–18)

## 2022-01-05 PROCEDURE — 84403 ASSAY OF TOTAL TESTOSTERONE: CPT | Performed by: UROLOGY

## 2022-01-05 PROCEDURE — 84146 ASSAY OF PROLACTIN: CPT | Performed by: UROLOGY

## 2022-01-05 PROCEDURE — 99204 OFFICE O/P NEW MOD 45 MIN: CPT | Performed by: UROLOGY

## 2022-01-05 PROCEDURE — 84270 ASSAY OF SEX HORMONE GLOBUL: CPT | Performed by: UROLOGY

## 2022-01-05 PROCEDURE — 83002 ASSAY OF GONADOTROPIN (LH): CPT | Performed by: UROLOGY

## 2022-01-05 PROCEDURE — 36415 COLL VENOUS BLD VENIPUNCTURE: CPT | Performed by: UROLOGY

## 2022-01-05 NOTE — PROGRESS NOTES
I am seeing Ren Farrar in consultation from Dr. Humphrey for evaluation of erectile dysfunction.    HPI:  Ren Farrar is a 61 year old male is a very nice man with complaints of erectile dysfunction for the last few years.  Past medical history significant for DM.    Had been using Viagra- but now is not working at 100mg dose.  Gets partial erections but not adequate for penetration.  Tried Cialis also, 5mg daily.  This helps a little.  Less AM erections until taking Cialis.      ED/Vascular disease risk factors:  HTN: yes, treated  Hyperlipidemia: yes, treated  Smoking: no smoker.   DM: yes, type II  Cardiovascular disease: None known  Meds associated with ED that he's taking: hydrochlorothiazide.  Anxiety/anger/depression:  No  Penile Plaques or curvature:  none.     He is , she is younger.    REVIEW OF SYSTEMS:  General: negative  Skin: negative  Eyes: negative  Ears/Nose/Throat: negative  Respiratory: negative  Cardiovascular: negative  Gastrointestinal: negative  Genitourinary: see HPI  Musculoskeletal: negative  Neurologic: denies neuropathy.  Psychiatric: negative  Hematologic/Lymphatic/Immunologic: negative  Endocrine: negative    PAST MEDICAL HX:  Past Medical History:   Diagnosis Date     Arthritis      Erectile dysfunction 2009     Essential hypertension, benign      GERD (gastroesophageal reflux disease)      Glaucoma      Herpes zoster 12/12/09    RT T5 or T6     Hyperlipidemia LDL goal <100      DARIA (obstructive sleep apnea) 4/5/10    Dr. Ugo Roth, on CPAP     DARIA (obstructive sleep apnea)      Posterior subcapsular cataract, bilateral 6/13/2013     Type II or unspecified type diabetes mellitus without mention of complication, not stated as uncontrolled 5/13/10       PAST SURG HX:  Past Surgical History:   Procedure Laterality Date     HC TRABECULOPLASTY BY LASER SURGERY       SELECTIVE LASER TRABECULOPLASTY (SLT) OD (RIGHT EYE) Right 08/21/2018    SLT #1 OD (inf 180)        FAMILY  HX:  Family History   Problem Relation Age of Onset     C.A.D. Mother      Myocardial Infarction Mother      Hypertension Mother      Diabetes Mother         borderline     Eye Disorder Mother         cataract     Kidney Disease Mother         dialysis     Cataracts Mother      Diabetes Father      Hypertension Father      Heart Failure Father          CHF 81     Diabetes Sister      Thyroid Disease Sister      Depression Son 17     Asthma No family hx of      Cerebrovascular Disease No family hx of      Breast Cancer No family hx of      Cancer - colorectal No family hx of      Prostate Cancer No family hx of      Allergies No family hx of      Cancer No family hx of      Glaucoma No family hx of      Macular Degeneration No family hx of        SOCIAL HX:  Social History     Tobacco Use     Smoking status: Never Smoker     Smokeless tobacco: Never Used   Substance Use Topics     Alcohol use: Yes     Comment: occassioanally     Drug use: No       MEDICATIONS:  Current Outpatient Medications   Medication Sig     alcohol swab prep pads Use to swab area of injection/yanci as directed.     amLODIPine-benazepril (LOTREL) 5-20 MG capsule Take 1 capsule by mouth daily for blood pressure.     ASPIRIN 81 MG PO TABS 1 TABLET DAILY*     blood glucose (NO BRAND SPECIFIED) test strip Use to test blood sugar 4 times daily or as directed. To accompany: Blood Glucose Monitor Brands: per insurance.     blood glucose calibration (NO BRAND SPECIFIED) solution To accompany: Blood Glucose Monitor Brands: per insurance.     blood glucose monitoring (NO BRAND SPECIFIED) meter device kit Use to test blood sugar 4 times daily or as directed. Preferred blood glucose meter/supplies to accompany: Monitor Brands: per insurance.     dulaglutide (TRULICITY) 0.75 MG/0.5ML pen Inject 0.75 mg Subcutaneous every 7 days for diabetes.     hydrochlorothiazide (HYDRODIURIL) 12.5 MG tablet Take 2 tablets (25 mg) by mouth daily for leg swelling (and  blood pressure).     latanoprost (XALATAN) 0.005 % ophthalmic solution Place 1 drop into both eyes At Bedtime 90 day supply ok.     meloxicam (MOBIC) 7.5 MG tablet Take 1 tablet (7.5 mg) by mouth daily     metFORMIN (GLUCOPHAGE-XR) 500 MG 24 hr tablet Take 4 tablets (2,000 mg) by mouth daily (with lunch) for diabetes.     niacin ER (NIASPAN) 500 MG CR tablet TAKE 4 TABLETS (2,000 MG) BY MOUTH AT BEDTIME FOR CHOLESTEROL. TAKE WITH LOW-FAT SNACK.     STATIN NOT PRESCRIBED, INTENTIONAL, Causes muscle aches     tadalafil (CIALIS) 5 MG tablet Take 1 tablet (5 mg) by mouth every 24 hours for ED.     thin (NO BRAND SPECIFIED) lancets Use with lanceting device. To accompany: Blood Glucose Monitor Brands: per insurance.     No current facility-administered medications for this visit.       ALLERGIES:  Lipitor [atorvastatin calcium] and Simvastatin      GENERAL PHYSICAL EXAM:     There were no vitals taken for this visit.   Constitutional: No acute distress. Well nourished.   PSYCH: normal mood and affect.  NEURO: normal gait, no focal deficits.   EYES: anicteric, EOMI, PERR.  CARDIOPULMONARY: breathing non-labored, pulse regular rate/rhythm, no peripheral edema.  GI: Abdomen soft, nontender, overweight   MUSCULOSKELETAL: normal limb proportions, no muscle wasting, no contractures.  SKIN: Normal virilized hair distribution, no lesions, warts or rashes over genitalia, abdomen extremities or face.  HEME/LYMPH: no ecchymosis, no lymphadenopathy in groin, no lymphedema.     EXAM:  Phallus  circumcised, meatus adequate, no plaques palpated.   Left testis descended , size is 15 , consistency is soft. No intra-testicular masses.   Right testis descended , size is 15 , consistency is soft. No intra-testicular masses.   Epididymes present, non-tender, not-enlarged.   Cord structures not remarkable.   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  I discussed that ED is a strong predictor for cardiovascular disease and cardiac events, and I am concerned that he could be at risk for a cardiac event in the future.    I advised he continue optimizing correctable risk factors for vascular disease where applicable (such as HTN, lipids, smoking cessation, DM) and consider a referral to preventative cardiology.      PLAN:    Testosterone, FSH, prolactin check today.    Schedule intracavernosal injections teaching/trial injection with  20mcg Edex with [facility]  [place] care coordinator.    Discussed IPP as a third line option.  Discussed alternatives of MUSE or STARLA.      Copied cc to Consulting provider Dr. Humphrey        Thank-you for the kind consultation.  Edison Koehler MD     Urological Surgeon     Additional Coding Information:    Problems:  4- DM with secondary ED.    Data Reviewed  Review of the result(s) of each unique test - > 3 lab tests, as listed.    Tests ordered/pending: 3 lab tests    Notes from other providers reviewed: N/A     Level of risk:  4 -- prescription drug management    Time spent:  22 minutes spent on the date of the encounter doing chart review, history and exam, documentation and further activities per the note

## 2022-01-05 NOTE — NURSING NOTE
Ren Farrar's goals for this visit include:   Chief Complaint   Patient presents with     New Patient     ED       He requests these members of his care team be copied on today's visit information:     PCP: Humberto Humphrey    Referring Provider:  Humberto Humphrey MD  [address] [place] [address]    There were no vitals taken for this visit.    Do you need any medication refills at today's visit?     Celena Chan LPN on 1/5/2022 at 3:20 PM

## 2022-01-05 NOTE — LETTER
1/5/2022       RE: Ren Farrar  [address]  [place] [address]     Dear Colleague,    Thank you for referring your patient, Ren Farrar, to the [facility]. Please see a copy of my visit note below.    I am seeing Ren Farrar in consultation from Dr. Humphrey for evaluation of erectile dysfunction.    HPI:  Ren Farrar is a 61 year old male is a very nice man with complaints of erectile dysfunction for the last few years.  Past medical history significant for DM.    Had been using Viagra- but now is not working at 100mg dose.  Gets partial erections but not adequate for penetration.  Tried Cialis also, 5mg daily.  This helps a little.  Less AM erections until taking Cialis.      ED/Vascular disease risk factors:  HTN: yes, treated  Hyperlipidemia: yes, treated  Smoking: no smoker.   DM: yes, type II  Cardiovascular disease: None known  Meds associated with ED that he's taking: hydrochlorothiazide.  Anxiety/anger/depression:  No  Penile Plaques or curvature:  none.     He is , she is younger.    REVIEW OF SYSTEMS:  General: negative  Skin: negative  Eyes: negative  Ears/Nose/Throat: negative  Respiratory: negative  Cardiovascular: negative  Gastrointestinal: negative  Genitourinary: see HPI  Musculoskeletal: negative  Neurologic: denies neuropathy.  Psychiatric: negative  Hematologic/Lymphatic/Immunologic: negative  Endocrine: negative    PAST MEDICAL HX:  Past Medical History:   Diagnosis Date     Arthritis      Erectile dysfunction 2009     Essential hypertension, benign      GERD (gastroesophageal reflux disease)      Glaucoma      Herpes zoster 12/12/09    RT T5 or T6     Hyperlipidemia LDL goal <100      DARIA (obstructive sleep apnea) 4/5/10    Dr. Ugo Roth, on CPAP     DARIA (obstructive sleep apnea)      Posterior subcapsular cataract, bilateral 6/13/2013     Type II or unspecified type diabetes  mellitus without mention of complication, not stated as uncontrolled 5/13/10       PAST SURG HX:  Past Surgical History:   Procedure Laterality Date     HC TRABECULOPLASTY BY LASER SURGERY       SELECTIVE LASER TRABECULOPLASTY (SLT) OD (RIGHT EYE) Right 2018    SLT #1 OD (inf 180)        FAMILY HX:  Family History   Problem Relation Age of Onset     C.A.D. Mother      Myocardial Infarction Mother      Hypertension Mother      Diabetes Mother         borderline     Eye Disorder Mother         cataract     Kidney Disease Mother         dialysis     Cataracts Mother      Diabetes Father      Hypertension Father      Heart Failure Father          CHF 81     Diabetes Sister      Thyroid Disease Sister      Depression Son 17     Asthma No family hx of      Cerebrovascular Disease No family hx of      Breast Cancer No family hx of      Cancer - colorectal No family hx of      Prostate Cancer No family hx of      Allergies No family hx of      Cancer No family hx of      Glaucoma No family hx of      Macular Degeneration No family hx of        SOCIAL HX:  Social History     Tobacco Use     Smoking status: Never Smoker     Smokeless tobacco: Never Used   Substance Use Topics     Alcohol use: Yes     Comment: occassioanally     Drug use: No       MEDICATIONS:  Current Outpatient Medications   Medication Sig     alcohol swab prep pads Use to swab area of injection/yanci as directed.     amLODIPine-benazepril (LOTREL) 5-20 MG capsule Take 1 capsule by mouth daily for blood pressure.     ASPIRIN 81 MG PO TABS 1 TABLET DAILY*     blood glucose (NO BRAND SPECIFIED) test strip Use to test blood sugar 4 times daily or as directed. To accompany: Blood Glucose Monitor Brands: per insurance.     blood glucose calibration (NO BRAND SPECIFIED) solution To accompany: Blood Glucose Monitor Brands: per insurance.     blood glucose monitoring (NO BRAND SPECIFIED) meter device kit Use to test blood sugar 4 times daily or as  directed. Preferred blood glucose meter/supplies to accompany: Monitor Brands: per insurance.     dulaglutide (TRULICITY) 0.75 MG/0.5ML pen Inject 0.75 mg Subcutaneous every 7 days for diabetes.     hydrochlorothiazide (HYDRODIURIL) 12.5 MG tablet Take 2 tablets (25 mg) by mouth daily for leg swelling (and blood pressure).     latanoprost (XALATAN) 0.005 % ophthalmic solution Place 1 drop into both eyes At Bedtime 90 day supply ok.     meloxicam (MOBIC) 7.5 MG tablet Take 1 tablet (7.5 mg) by mouth daily     metFORMIN (GLUCOPHAGE-XR) 500 MG 24 hr tablet Take 4 tablets (2,000 mg) by mouth daily (with lunch) for diabetes.     niacin ER (NIASPAN) 500 MG CR tablet TAKE 4 TABLETS (2,000 MG) BY MOUTH AT BEDTIME FOR CHOLESTEROL. TAKE WITH LOW-FAT SNACK.     STATIN NOT PRESCRIBED, INTENTIONAL, Causes muscle aches     tadalafil (CIALIS) 5 MG tablet Take 1 tablet (5 mg) by mouth every 24 hours for ED.     thin (NO BRAND SPECIFIED) lancets Use with lanceting device. To accompany: Blood Glucose Monitor Brands: per insurance.     No current facility-administered medications for this visit.       ALLERGIES:  Lipitor [atorvastatin calcium] and Simvastatin      GENERAL PHYSICAL EXAM:     There were no vitals taken for this visit.   Constitutional: No acute distress. Well nourished.   PSYCH: normal mood and affect.  NEURO: normal gait, no focal deficits.   EYES: anicteric, EOMI, PERR.  CARDIOPULMONARY: breathing non-labored, pulse regular rate/rhythm, no peripheral edema.  GI: Abdomen soft, nontender, overweight   MUSCULOSKELETAL: normal limb proportions, no muscle wasting, no contractures.  SKIN: Normal virilized hair distribution, no lesions, warts or rashes over genitalia, abdomen extremities or face.  HEME/LYMPH: no ecchymosis, no lymphadenopathy in groin, no lymphedema.     EXAM:  Phallus  circumcised, meatus adequate, no plaques palpated.   Left testis descended , size is 15 , consistency is soft. No intra-testicular  masses.   Right testis descended , size is 15 , consistency is soft. No intra-testicular masses.   Epididymes present, non-tender, not-enlarged.   Cord structures not remarkable.   Prostate exam: deferred     Imaging/labs:  Lab Results   Component Value Date    CR 1.01 12/20/2021    CR 1.02 08/11/2021    CR 1.01 09/01/2020    CR 0.89 04/02/2020    CR 0.82 10/24/2019     Lab Results   Component Value Date    PSA 0.63 11/01/2011    PSA 0.68 01/21/2011         ASSESSMENT:     Erectile dysfunction, organic, most likely secondary to DM-related arterial disease, we discussed.  I discussed that ED is a strong predictor for cardiovascular disease and cardiac events, and I am concerned that he could be at risk for a cardiac event in the future.    I advised he continue optimizing correctable risk factors for vascular disease where applicable (such as HTN, lipids, smoking cessation, DM) and consider a referral to preventative cardiology.      PLAN:    Testosterone, FSH, prolactin check today.    Schedule intracavernosal injections teaching/trial injection with  20g Edex with [facility]  RN care coordinator.    Discussed IPP as a third line option.  Discussed alternatives of MUSE or STARLA.      Copied cc to Consulting provider Dr. Humphrey        Thank-you for the kind consultation.  Edison Koehler MD     Urological Surgeon     Additional Coding Information:    Problems:  4- DM with secondary ED.    Data Reviewed  Review of the result(s) of each unique test - > 3 lab tests, as listed.    Tests ordered/pending: 3 lab tests    Notes from other providers reviewed: N/A     Level of risk:  4 -- prescription drug management    Time spent:  22 minutes spent on the date of the encounter doing chart review, history and exam, documentation and further activities per the note                    Again, thank you for allowing me to participate in the care of your patient.      Sincerely,    Edison Koehler MD

## 2022-01-06 LAB — SHBG SERPL-SCNC: 15 NMOL/L (ref 11–80)

## 2022-01-07 LAB
TESTOST FREE SERPL-MCNC: 5.23 NG/DL
TESTOST SERPL-MCNC: 189 NG/DL (ref 240–950)

## 2022-01-08 DIAGNOSIS — E29.1 HYPOGONADISM MALE: Primary | ICD-10-CM

## 2022-01-09 NOTE — RESULT ENCOUNTER NOTE
Dear Ren,     Here are your recent results.     Testosterone check was low.  This can affect erections.  The cause of low testosterone appears to primarily be age related.  I will send a referral to endocrinology, you can pursue this if you're interested in considering a trial of testosterone replacement therapy.    Please let us know if you have any questions or concerns.     Melida ROMERO

## 2022-01-10 ENCOUNTER — TELEPHONE (OUTPATIENT)
Dept: GASTROENTEROLOGY | Facility: CLINIC | Age: 62
End: 2022-01-10
Payer: COMMERCIAL

## 2022-01-10 NOTE — TELEPHONE ENCOUNTER
Pt insists on being seen at [place]. I explained that a hospital setting would be the safest place for him to have his procedure. He refused. Sent an email to Margarita.

## 2022-01-10 NOTE — TELEPHONE ENCOUNTER
Screening Questions  1. Are you active on mychart? Y    2. What insurance is in the chart? MULTIPLAN     2.  Ordering/Referring Provider: Humberto Humphrey MD in [facility] FP/IM/PEDS    3. BMI 31.2, If greater than 40 review exclusion criteria also will need EXTENDED PREP    4.  Respiratory Screening (If yes to any of the following HOSPITAL setting only):     Do you use daily home oxygen? N  Do you have mod to severe Obstructive Sleep Apnea? Y   Do you have Pulmonary Hypertension? N   Do you have UNCONTROLLED asthma? N    5. Have you had a heart or lung transplant? N  (If yes, please review exclusion criteria)    6. Are you currently on dialysis?N  (If yes, schedule in HOSPITAL setting only)(If yes, please send Golytely prep)    7. Do you have chronic kidney disease? N (If yes, please send Golytely prep)    7. Have you had a stroke or Transient ischemic attack (TIA) within 6 months? N (If yes, do not schedule at [facility])    8. In the past 6 months, have you had any heart related issues including cardiomyopathy or heart attack? N (If yes, please review exclusion criteria)           If yes, did it require cardiac stenting or other implantable device?N  (If yes, please review exclusion criteria)      9. Do you have any implantable devices in your body (pacemaker, defib, LVAD)? N (If yes, schedule at [facility])    10. Do you take nitroglycerin? If yes, how often? N (if yes, schedule at HOSPITAL setting)    11. Are you currently taking any blood thinners?N (If yes- inform patient to follow up with PCP or provider for follow up instructions)     12. Are you a diabetic? Y (If yes, please send Golytely prep)    13. (Females) Are you currently pregnant?   If yes, how many weeks?      15. Are you taking any prescription pain medications on a routine schedule? N If yes, MAC sedation and patient will need EXTENDED PREP.    16. Do you have any chemical dependencies such as alcohol, street drugs, or methadone? N If yes, MAC sedation and patient  will need EXTENDED PREP    17. Do you have any history of post-traumatic stress syndrome, severe anxiety or history of psychosis? N  If yes, MAC sedation.     18. Do you transfer independently? Y    19.  Do you have any issues with constipation? N   If yes, pt will need EXTENDED PREP     20. Preferred Pharmacy for Pre Prescription [facility]

## 2022-01-19 ENCOUNTER — TELEPHONE (OUTPATIENT)
Dept: GASTROENTEROLOGY | Facility: CLINIC | Age: 62
End: 2022-01-19
Payer: COMMERCIAL

## 2022-01-21 ENCOUNTER — OFFICE VISIT (OUTPATIENT)
Dept: UROLOGY | Facility: CLINIC | Age: 62
End: 2022-01-21
Payer: COMMERCIAL

## 2022-01-21 DIAGNOSIS — N52.1 ERECTILE DYSFUNCTION DUE TO DISEASES CLASSIFIED ELSEWHERE: Primary | ICD-10-CM

## 2022-01-21 PROCEDURE — 99211 OFF/OP EST MAY X REQ PHY/QHP: CPT

## 2022-01-21 RX ORDER — ALPROSTADIL 20 UG/ML
20 INJECTION, POWDER, LYOPHILIZED, FOR SOLUTION INTRACAVERNOUS DAILY PRN
Qty: 40 MCG | Refills: 3 | Status: SHIPPED | OUTPATIENT
Start: 2022-01-21

## 2022-01-21 NOTE — NURSING NOTE
Chief Complaint   Patient presents with     Clinic Care Coordination - Face To Face       Patient Active Problem List   Diagnosis     DARIA (obstructive sleep apnea)     Type 2 diabetes mellitus with hyperglycemia, without long-term current use of insulin (H)     Hyperlipidemia LDL goal <100     Gastroesophageal reflux disease without esophagitis     Erectile dysfunction     Essential hypertension with goal blood pressure less than 140/90     Pain in joint, shoulder region     Obesity, Class I, BMI 30-34.9     Osteoarthritis of left hip     Nuclear sclerotic cataract of both eyes     Advanced directives, counseling/discussion     Adhesive capsulitis     Shoulder impingement     OAG (open angle glaucoma)     Anemia       Allergies   Allergen Reactions     Lipitor [Atorvastatin Calcium]      Myalgias, increased CPK     Simvastatin      myalgias         Current Outpatient Medications:      alprostadil (EDEX) 20 MCG kit, 20 mcg by Intracavitary route daily as needed for erectile dysfunction use no more than 3 times per week, Disp: 40 mcg, Rfl: 3     alcohol swab prep pads, Use to swab area of injection/yanci as directed., Disp: 100 each, Rfl: 3     amLODIPine-benazepril (LOTREL) 5-20 MG capsule, Take 1 capsule by mouth daily for blood pressure., Disp: 90 capsule, Rfl: 1     ASPIRIN 81 MG PO TABS, 1 TABLET DAILY*, Disp: , Rfl: prn     blood glucose (NO BRAND SPECIFIED) test strip, Use to test blood sugar 4 times daily or as directed. To accompany: Blood Glucose Monitor Brands: per insurance., Disp: 100 strip, Rfl: 6     blood glucose calibration (NO BRAND SPECIFIED) solution, To accompany: Blood Glucose Monitor Brands: per insurance., Disp: 1 Bottle, Rfl: 3     blood glucose monitoring (NO BRAND SPECIFIED) meter device kit, Use to test blood sugar 4 times daily or as directed. Preferred blood glucose meter/supplies to accompany: Monitor Brands: per insurance., Disp: 1 kit, Rfl: 0     dulaglutide (TRULICITY) 0.75 MG/0.5ML  pen, Inject 0.75 mg Subcutaneous every 7 days for diabetes., Disp: 6 mL, Rfl: 1     hydrochlorothiazide (HYDRODIURIL) 12.5 MG tablet, Take 2 tablets (25 mg) by mouth daily for leg swelling (and blood pressure)., Disp: 180 tablet, Rfl: 1     latanoprost (XALATAN) 0.005 % ophthalmic solution, Place 1 drop into both eyes At Bedtime 90 day supply ok., Disp: 7.5 mL, Rfl: 4     meloxicam (MOBIC) 7.5 MG tablet, Take 1 tablet (7.5 mg) by mouth daily, Disp: 30 tablet, Rfl: 0     metFORMIN (GLUCOPHAGE-XR) 500 MG 24 hr tablet, Take 4 tablets (2,000 mg) by mouth daily (with lunch) for diabetes., Disp: 360 tablet, Rfl: 1     niacin ER (NIASPAN) 500 MG CR tablet, TAKE 4 TABLETS (2,000 MG) BY MOUTH AT BEDTIME FOR CHOLESTEROL. TAKE WITH LOW-FAT SNACK., Disp: 360 tablet, Rfl: 2     STATIN NOT PRESCRIBED, INTENTIONAL,, Causes muscle aches, Disp: , Rfl:      tadalafil (CIALIS) 5 MG tablet, Take 1 tablet (5 mg) by mouth every 24 hours for ED., Disp: 30 tablet, Rfl: 5     thin (NO BRAND SPECIFIED) lancets, Use with lanceting device. To accompany: Blood Glucose Monitor Brands: per insurance., Disp: 200 each, Rfl: 6    Social History     Tobacco Use     Smoking status: Never Smoker     Smokeless tobacco: Never Used   Substance Use Topics     Alcohol use: Yes     Comment: occassioanally     Drug use: No     Patient comes into clinic today at the request of Dr. Edison Koehler for intracavernosal injection teaching. I did the injection teaching.      This service provided today was under the direct supervision of Dr. Koehler, who is   available if needed.     Patient presents to clinic for Edex injection teaching. Patient's last erection was in July of 202.   Edex informational patient packet was read and reviewed in clinic by patient.  Reviewed usage and possible side effects.  Questions answered appropriately. Discussed there needs to be some element of foreplay after injecting Edex.   This reaches it's full effectiveness in 20 minutes.    Patient injected 20 mcg of Edex into the cavernosa without this writer's assistance.     After 20 minutes, patient reassessed.  Patient rates his erection a 6/10. Patient stated that it would be firm enough for penetration.  Patient educated on alternating sites for injection, left and right side of shaft. Also discussed not to use more than 3 times per week, at least 24 hours between each injection.   Also discussed prolonged erections and need to go to ER if that happens. He would need to go into the ER in 4 hours if his erection is prolonged.   Patient verbalized understanding.   Patient has no erection at this time before.   No further questions.  Patient to call if he has any questions or concerns.  I am asking patient call me after the first injection to discuss the response.  Prescription for Edex 20 mcg sent in to patient's pharmacy.  Patient verbalized understanding voiced he would be able to conduct injections at home. His wife is also a RN and is willing to assist him if needed. Patient will have his spouse call this writer if she has questions and patient gave verbal consent to speak with his spouse.    Prior to injection, verified patient identity using patient's name and date of birth.  Due to injection administration, patient instructed to remain in clinic for 15 minutes  afterwards, and to report any adverse reaction to me immediately.     The following medication was given:      MEDICATION: Edex  ROUTE: Intracavitary  SITE: penis  DOSE: 20  LOT #: [id number]  :  [employer]  EXPIRATION DATE:  06/2023   14423-841-02             The following medication was given:     Was there drug waste? No  Multi-dose vial: No    Osiris Frederick RN  January 21, 2022

## 2022-01-24 ENCOUNTER — VIRTUAL VISIT (OUTPATIENT)
Dept: ENDOCRINOLOGY | Facility: CLINIC | Age: 62
End: 2022-01-24
Attending: UROLOGY
Payer: COMMERCIAL

## 2022-01-24 DIAGNOSIS — E29.1 HYPOGONADISM MALE: ICD-10-CM

## 2022-01-24 PROCEDURE — 99204 OFFICE O/P NEW MOD 45 MIN: CPT | Mod: 95 | Performed by: INTERNAL MEDICINE

## 2022-01-24 NOTE — LETTER
1/24/2022         RE: Ren Farrar  [address]  [place] [address]        Dear Colleague,    Thank you for referring your patient, Ren Farrar, to the [facility]. Please see a copy of my visit note below.         Endocrinology Note         Ren is a 61 year old male presents today for low testosterone    HPI  Ren is a 61 year old male with hx of type 2 diabetes, hypertension, presents today for low testosterone    He reports having erectile dysfunction for the last few years. He has been on Viagra but it is not working. He gets some erection but not adequate for penetration. He tried Cialis which helps some.     He received first intracavernous injection of alprostadil last week.    He reports overall good health. He has no fatigue. Sleep is ok. He has snoring at night and never used CPAP. He lost some weight after starting Trulicity last summer. He reports good vision and no chronic headache.    He has long standing type 2 diabetes -- A1c ranged 7-9%. The most recent A1c 7.7 on 12/20/2021.  He has not had difficulty having children. His youngest child is 19 years old.    He got lab test done 1/5/2022 and found to have testosterone of 189, free T 5.23, SHBG 15, LH 7.6, prolactin 11.    Past Medical History  Past Medical History:   Diagnosis Date     Arthritis      Erectile dysfunction 2009     Essential hypertension, benign      GERD (gastroesophageal reflux disease)      Glaucoma      Herpes zoster 12/12/09    RT T5 or T6     Hyperlipidemia LDL goal <100      DARIA (obstructive sleep apnea) 4/5/10    Dr. Ugo Roth, on CPAP     DARIA (obstructive sleep apnea)      Posterior subcapsular cataract, bilateral 6/13/2013     Type II or unspecified type diabetes mellitus without mention of complication, not stated as uncontrolled 5/13/10       Allergies  Allergies   Allergen Reactions     Lipitor [Atorvastatin Calcium]      Myalgias, increased CPK     Simvastatin       myalgias     Medications  Current Outpatient Medications   Medication Sig Dispense Refill     alcohol swab prep pads Use to swab area of injection/yanci as directed. 100 each 3     alprostadil (EDEX) 20 MCG kit 20 mcg by Intracavitary route daily as needed for erectile dysfunction use no more than 3 times per week 40 mcg 3     amLODIPine-benazepril (LOTREL) 5-20 MG capsule Take 1 capsule by mouth daily for blood pressure. 90 capsule 1     ASPIRIN 81 MG PO TABS 1 TABLET DAILY*  prn     blood glucose (NO BRAND SPECIFIED) test strip Use to test blood sugar 4 times daily or as directed. To accompany: Blood Glucose Monitor Brands: per insurance. 100 strip 6     blood glucose calibration (NO BRAND SPECIFIED) solution To accompany: Blood Glucose Monitor Brands: per insurance. 1 Bottle 3     blood glucose monitoring (NO BRAND SPECIFIED) meter device kit Use to test blood sugar 4 times daily or as directed. Preferred blood glucose meter/supplies to accompany: Monitor Brands: per insurance. 1 kit 0     dulaglutide (TRULICITY) 0.75 MG/0.5ML pen Inject 0.75 mg Subcutaneous every 7 days for diabetes. 6 mL 1     hydrochlorothiazide (HYDRODIURIL) 12.5 MG tablet Take 2 tablets (25 mg) by mouth daily for leg swelling (and blood pressure). 180 tablet 1     latanoprost (XALATAN) 0.005 % ophthalmic solution Place 1 drop into both eyes At Bedtime 90 day supply ok. 7.5 mL 4     meloxicam (MOBIC) 7.5 MG tablet Take 1 tablet (7.5 mg) by mouth daily 30 tablet 0     metFORMIN (GLUCOPHAGE-XR) 500 MG 24 hr tablet Take 4 tablets (2,000 mg) by mouth daily (with lunch) for diabetes. 360 tablet 1     niacin ER (NIASPAN) 500 MG CR tablet TAKE 4 TABLETS (2,000 MG) BY MOUTH AT BEDTIME FOR CHOLESTEROL. TAKE WITH LOW-FAT SNACK. 360 tablet 2     STATIN NOT PRESCRIBED, INTENTIONAL, Causes muscle aches       tadalafil (CIALIS) 5 MG tablet Take 1 tablet (5 mg) by mouth every 24 hours for ED. 30 tablet 5     thin (NO BRAND SPECIFIED) lancets Use with  lanceting device. To accompany: Blood Glucose Monitor Brands: per insurance. 200 each 6     Family History  family history includes C.A.D. in his mother; Cataracts in his mother; Depression (age of onset: 17) in his son; Diabetes in his father, mother, and sister; Eye Disorder in his mother; Heart Failure in his father; Hypertension in his father and mother; Kidney Disease in his mother; Myocardial Infarction in his mother; Thyroid Disease in his sister.     Social History  Social History     Tobacco Use     Smoking status: Never Smoker     Smokeless tobacco: Never Used   Substance Use Topics     Alcohol use: Yes     Comment: occassioanally     Drug use: No   no smoking, drink ETOH occasionally  , His youngest child is 19 years old.    ROS  10 points ROS were negative otherwise mentioned in HPI      Physical Exam  Limited due to virtual visit  Constitutional: no distress, comfortable, pleasant   Eyes: anicteric  Psychological: appropriate mood       RESULTS  I have personally reviewed labs and images. I also reviewed labs with patient and discussed the result and plan of care.    Results for POLLY ABAD (MRN [id number]) as of 1/23/2022 21:38   Ref. Range 1/5/2022 15:47   Free Testosterone Calculated Latest Units: ng/dL 5.23   Prolactin Latest Ref Range: 2 - 18 ug/L 11   Testosterone Total Latest Ref Range: 240 - 950 ng/dL 189 (L)   Lutropin Latest Ref Range: 1.5 - 9.3 IU/L 7.6   Sex Hormone Binding Globulin Latest Ref Range: 11 - 80 nmol/L 15     Lab Results   Component Value Date    A1C 7.7 12/20/2021    A1C 9.2 08/11/2021    A1C 7.7 01/25/2021    A1C 8.1 09/01/2020    A1C 8.0 04/02/2020    A1C 7.3 10/24/2019    A1C 7.5 03/15/2019       ASSESSMENT:    Polly is a 61 year old male with hx of type 2 diabetes presents today for low testosterone    1) low testosterone: Based on lab, it is consistent with primary hypogonadism.  likely multifactorial due to age and obesity. Since random testosterone was checked  in the afternoon. Would recommend to repeat AM testosterone, FSH, LH, SHBG.    2) erectile dysfunction: this is his main issue. Likely due to long standing type 2 diabetes, vascular disease, obesity, hyperlipidemia. received first intracavernous injection of alprostadil last week. Managed per urology.    PLAN:   - repeat testosterone, FSH, LH, SHBG in AM  - will contact patient with results    Start: 01/24/2022 08:58 am  Stop: 01/24/2022 09:13 am  VDO duration 15 minutes    External notes/medical records independently reviewed, labs and imaging independently reviewed, medical management and tests to be discussed/communicated to patient.    Time: I spent 57 minutes spent on the date of the encounter preparing to see patient (including chart review and preparation), obtaining and or reviewing additional medical history, performing a physical exam and evaluation, documenting clinical information in the electronic health record, independently interpreting results, communicating results to the patient and coordinating care.      Osito Duffy MD  Division of Diabetes and Endocrinology  Department of Medicine  [phone]      Ren is a 61 year old who is being evaluated via a billable video visit.      How would you like to obtain your AVS? MyChart  If the video visit is dropped, the invitation should be resent by: Send to e-mail at: [email or web address]  Will anyone else be joining your video visit? No    Dania España, [facility]  Adult Endocrinology  [facility], [place]        Again, thank you for allowing me to participate in the care of your patient.        Sincerely,        Osito Duffy MD

## 2022-01-24 NOTE — PROGRESS NOTES
Endocrinology Note         Ren is a 61 year old male presents today for low testosterone    HPI  Ren is a 61 year old male with hx of type 2 diabetes, hypertension, presents today for low testosterone    He reports having erectile dysfunction for the last few years. He has been on Viagra but it is not working. He gets some erection but not adequate for penetration. He tried Cialis which helps some.     He received first intracavernous injection of alprostadil last week.    He reports overall good health. He has no fatigue. Sleep is ok. He has snoring at night and never used CPAP. He lost some weight after starting Trulicity last summer. He reports good vision and no chronic headache.    He has long standing type 2 diabetes -- A1c ranged 7-9%. The most recent A1c 7.7 on 12/20/2021.  He has not had difficulty having children. His youngest child is 19 years old.    He got lab test done 1/5/2022 and found to have testosterone of 189, free T 5.23, SHBG 15, LH 7.6, prolactin 11.    Past Medical History  Past Medical History:   Diagnosis Date     Arthritis      Erectile dysfunction 2009     Essential hypertension, benign      GERD (gastroesophageal reflux disease)      Glaucoma      Herpes zoster 12/12/09    RT T5 or T6     Hyperlipidemia LDL goal <100      DARIA (obstructive sleep apnea) 4/5/10    Dr. Ugo Roth, on CPAP     DARIA (obstructive sleep apnea)      Posterior subcapsular cataract, bilateral 6/13/2013     Type II or unspecified type diabetes mellitus without mention of complication, not stated as uncontrolled 5/13/10       Allergies  Allergies   Allergen Reactions     Lipitor [Atorvastatin Calcium]      Myalgias, increased CPK     Simvastatin      myalgias     Medications  Current Outpatient Medications   Medication Sig Dispense Refill     alcohol swab prep pads Use to swab area of injection/yanci as directed. 100 each 3     alprostadil (EDEX) 20 MCG kit 20 mcg by Intracavitary route daily as needed for  erectile dysfunction use no more than 3 times per week 40 mcg 3     amLODIPine-benazepril (LOTREL) 5-20 MG capsule Take 1 capsule by mouth daily for blood pressure. 90 capsule 1     ASPIRIN 81 MG PO TABS 1 TABLET DAILY*  prn     blood glucose (NO BRAND SPECIFIED) test strip Use to test blood sugar 4 times daily or as directed. To accompany: Blood Glucose Monitor Brands: per insurance. 100 strip 6     blood glucose calibration (NO BRAND SPECIFIED) solution To accompany: Blood Glucose Monitor Brands: per insurance. 1 Bottle 3     blood glucose monitoring (NO BRAND SPECIFIED) meter device kit Use to test blood sugar 4 times daily or as directed. Preferred blood glucose meter/supplies to accompany: Monitor Brands: per insurance. 1 kit 0     dulaglutide (TRULICITY) 0.75 MG/0.5ML pen Inject 0.75 mg Subcutaneous every 7 days for diabetes. 6 mL 1     hydrochlorothiazide (HYDRODIURIL) 12.5 MG tablet Take 2 tablets (25 mg) by mouth daily for leg swelling (and blood pressure). 180 tablet 1     latanoprost (XALATAN) 0.005 % ophthalmic solution Place 1 drop into both eyes At Bedtime 90 day supply ok. 7.5 mL 4     meloxicam (MOBIC) 7.5 MG tablet Take 1 tablet (7.5 mg) by mouth daily 30 tablet 0     metFORMIN (GLUCOPHAGE-XR) 500 MG 24 hr tablet Take 4 tablets (2,000 mg) by mouth daily (with lunch) for diabetes. 360 tablet 1     niacin ER (NIASPAN) 500 MG CR tablet TAKE 4 TABLETS (2,000 MG) BY MOUTH AT BEDTIME FOR CHOLESTEROL. TAKE WITH LOW-FAT SNACK. 360 tablet 2     STATIN NOT PRESCRIBED, INTENTIONAL, Causes muscle aches       tadalafil (CIALIS) 5 MG tablet Take 1 tablet (5 mg) by mouth every 24 hours for ED. 30 tablet 5     thin (NO BRAND SPECIFIED) lancets Use with lanceting device. To accompany: Blood Glucose Monitor Brands: per insurance. 200 each 6     Family History  family history includes C.A.D. in his mother; Cataracts in his mother; Depression (age of onset: 17) in his son; Diabetes in his father, mother, and sister;  Eye Disorder in his mother; Heart Failure in his father; Hypertension in his father and mother; Kidney Disease in his mother; Myocardial Infarction in his mother; Thyroid Disease in his sister.     Social History  Social History     Tobacco Use     Smoking status: Never Smoker     Smokeless tobacco: Never Used   Substance Use Topics     Alcohol use: Yes     Comment: occassioanally     Drug use: No   no smoking, drink ETOH occasionally  , His youngest child is 19 years old.    ROS  10 points ROS were negative otherwise mentioned in HPI      Physical Exam  Limited due to virtual visit  Constitutional: no distress, comfortable, pleasant   Eyes: anicteric  Psychological: appropriate mood       RESULTS  I have personally reviewed labs and images. I also reviewed labs with patient and discussed the result and plan of care.    Results for POLLY ABAD (MRN [id number]) as of 1/23/2022 21:38   Ref. Range 1/5/2022 15:47   Free Testosterone Calculated Latest Units: ng/dL 5.23   Prolactin Latest Ref Range: 2 - 18 ug/L 11   Testosterone Total Latest Ref Range: 240 - 950 ng/dL 189 (L)   Lutropin Latest Ref Range: 1.5 - 9.3 IU/L 7.6   Sex Hormone Binding Globulin Latest Ref Range: 11 - 80 nmol/L 15     Lab Results   Component Value Date    A1C 7.7 12/20/2021    A1C 9.2 08/11/2021    A1C 7.7 01/25/2021    A1C 8.1 09/01/2020    A1C 8.0 04/02/2020    A1C 7.3 10/24/2019    A1C 7.5 03/15/2019       ASSESSMENT:    Polly is a 61 year old male with hx of type 2 diabetes presents today for low testosterone    1) low testosterone: Based on lab, it is consistent with primary hypogonadism.  likely multifactorial due to age and obesity. Since random testosterone was checked in the afternoon. Would recommend to repeat AM testosterone, FSH, LH, SHBG.    2) erectile dysfunction: this is his main issue. Likely due to long standing type 2 diabetes, vascular disease, obesity, hyperlipidemia. received first intracavernous injection of  alprostadil last week. Managed per urology.    PLAN:   - repeat testosterone, FSH, LH, SHBG in AM  - will contact patient with results    Start: 01/24/2022 08:58 am  Stop: 01/24/2022 09:13 am  VDO duration 15 minutes    External notes/medical records independently reviewed, labs and imaging independently reviewed, medical management and tests to be discussed/communicated to patient.    Time: I spent 57 minutes spent on the date of the encounter preparing to see patient (including chart review and preparation), obtaining and or reviewing additional medical history, performing a physical exam and evaluation, documenting clinical information in the electronic health record, independently interpreting results, communicating results to the patient and coordinating care.      Osito Duffy MD  Division of Diabetes and Endocrinology  Department of Medicine  [phone]

## 2022-01-24 NOTE — PROGRESS NOTES
Ren is a 61 year old who is being evaluated via a billable video visit.      How would you like to obtain your AVS? MyChart  If the video visit is dropped, the invitation should be resent by: Send to e-mail at: [email or web address]  Will anyone else be joining your video visit? No    Dania España CMA  Adult Endocrinology  [facility], [place]

## 2022-01-24 NOTE — PATIENT INSTRUCTIONS
- repeat morning testosterone level    If you have any questions, please do not hesitate to call [facility] at [phone] and ask for Endocrinology clinic.    Sincerely,    Osito Duffy MD  Endocrinology

## 2022-02-04 ENCOUNTER — TELEPHONE (OUTPATIENT)
Dept: UROLOGY | Facility: CLINIC | Age: 62
End: 2022-02-04
Payer: COMMERCIAL

## 2022-02-04 NOTE — TELEPHONE ENCOUNTER
Prior Authorization Retail Medication Request    Medication/Dose: alprostadil (EDEX) 20 MCG kit  ICD code (if different than what is on RX):    Previously Tried and Failed:    Rationale:      Insurance Name:    Insurance ID:        Pharmacy Information (if different than what is on RX)  Name:    Phone:

## 2022-02-08 NOTE — TELEPHONE ENCOUNTER
Central Prior Authorization Team   Phone: [phone]    PA Initiation    Medication: alprostadil (EDEX) 20 MCG kit  Insurance Company: [employer] - Phone [phone] Fax [phone]  Pharmacy Filling the Rx: [id number] - [place], [place] - [address]  Filling Pharmacy Phone: [phone]  Filling Pharmacy Fax: [phone]  Start Date: 2/8/2022

## 2022-02-09 DIAGNOSIS — N52.1 ERECTILE DYSFUNCTION DUE TO DISEASES CLASSIFIED ELSEWHERE: Primary | ICD-10-CM

## 2022-02-09 NOTE — TELEPHONE ENCOUNTER
PRIOR AUTHORIZATION DENIED    Medication: alprostadil (EDEX) 20 MCG kit-PA DENIED     Denial Date: 2/8/2022    Denial Rational: Medication Exclusion from patients benefit plan.         Appeal Information:

## 2022-02-10 ENCOUNTER — OFFICE VISIT (OUTPATIENT)
Dept: URGENT CARE | Facility: URGENT CARE | Age: 62
End: 2022-02-10
Payer: COMMERCIAL

## 2022-02-10 VITALS
DIASTOLIC BLOOD PRESSURE: 79 MMHG | HEART RATE: 85 BPM | RESPIRATION RATE: 14 BRPM | WEIGHT: 238.6 LBS | SYSTOLIC BLOOD PRESSURE: 122 MMHG | BODY MASS INDEX: 32.59 KG/M2 | OXYGEN SATURATION: 96 % | TEMPERATURE: 97.1 F

## 2022-02-10 DIAGNOSIS — S09.90XA INJURY OF HEAD, INITIAL ENCOUNTER: Primary | ICD-10-CM

## 2022-02-10 PROCEDURE — 99213 OFFICE O/P EST LOW 20 MIN: CPT | Performed by: STUDENT IN AN ORGANIZED HEALTH CARE EDUCATION/TRAINING PROGRAM

## 2022-02-11 NOTE — PROGRESS NOTES
SUBJECTIVE:  Ren Farrar is an 61 year old male who presents for fall.  Patient slipped on some ice and fell backward earlier this evening and hit his head.  This was about an hour to an hour and a half prior to presentation here.  He has some pain in the back of his head where he hit but otherwise no unusual symptoms.  He does not think he lost consciousness.  He is very sure that this was an accidental slip and fall rather than another mechanism.  He has no unusual weakness, numbness, or tingling anywhere and has normal neck range of motion without pain.  No blurry vision or double vision.  No nausea or vomiting.  No amnesia.  He does not take any blood thinners except for 81 mg aspirin.    PMH:   has a past medical history of Arthritis, Erectile dysfunction (2009), Essential hypertension, benign, GERD (gastroesophageal reflux disease), Glaucoma, Herpes zoster (12/12/09), Hyperlipidemia LDL goal <100, DARIA (obstructive sleep apnea) (4/5/10), DARIA (obstructive sleep apnea), Posterior subcapsular cataract, bilateral (6/13/2013), and Type II or unspecified type diabetes mellitus without mention of complication, not stated as uncontrolled (5/13/10).  Patient Active Problem List   Diagnosis     DARIA (obstructive sleep apnea)     Type 2 diabetes mellitus with hyperglycemia, without long-term current use of insulin (H)     Hyperlipidemia LDL goal <100     Gastroesophageal reflux disease without esophagitis     Erectile dysfunction     Essential hypertension with goal blood pressure less than 140/90     Pain in joint, shoulder region     Obesity, Class I, BMI 30-34.9     Osteoarthritis of left hip     Nuclear sclerotic cataract of both eyes     Advanced directives, counseling/discussion     Adhesive capsulitis     Shoulder impingement     OAG (open angle glaucoma)     Anemia     Social History     Socioeconomic History     Marital status:      Spouse name: Jeff     Number of children: 4     Years of education: 16      Highest education level: None   Occupational History     Occupation: , 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 Not Asked   Social History Narrative     None     Social Determinants of Health     Financial Resource Strain: Not on file   Food Insecurity: Not on file   Transportation Needs: Not on file   Physical Activity: Not on file   Stress: Not on file   Social Connections: Not on file   Intimate Partner Violence: Not on file   Housing Stability: Not on file     Family History   Problem Relation Age of Onset     C.A.D. Mother      Myocardial Infarction Mother      Hypertension Mother      Diabetes Mother         borderline     Eye Disorder Mother         cataract     Kidney Disease Mother         dialysis     Cataracts Mother      Diabetes Father      Hypertension Father      Heart Failure Father          CHF 81     Diabetes Sister      Thyroid Disease Sister      Depression Son 17     Asthma No family hx of      Cerebrovascular Disease No family hx of      Breast Cancer No family hx of      Cancer - colorectal No family hx of      Prostate Cancer No family hx of      Allergies No family hx of      Cancer No family hx of      Glaucoma No family hx of      Macular Degeneration No family hx of        ALLERGIES:  Lipitor [atorvastatin calcium] and Simvastatin    Current Outpatient Medications   Medication     alcohol swab prep pads     alprostadil (EDEX) 20 MCG kit     amLODIPine-benazepril (LOTREL) 5-20 MG capsule     ASPIRIN 81 MG PO TABS     blood glucose (NO BRAND SPECIFIED) test strip     blood glucose calibration (NO BRAND SPECIFIED) solution      blood glucose monitoring (NO BRAND SPECIFIED) meter device kit     COMPOUNDED NON-CONTROLLED SUBSTANCE (CMPD RX) - PHARMACY TO MIX COMPOUNDED MEDICATION     dulaglutide (TRULICITY) 0.75 MG/0.5ML pen     hydrochlorothiazide (HYDRODIURIL) 12.5 MG tablet     latanoprost (XALATAN) 0.005 % ophthalmic solution     meloxicam (MOBIC) 7.5 MG tablet     metFORMIN (GLUCOPHAGE-XR) 500 MG 24 hr tablet     niacin ER (NIASPAN) 500 MG CR tablet     STATIN NOT PRESCRIBED, INTENTIONAL,     tadalafil (CIALIS) 5 MG tablet     thin (NO BRAND SPECIFIED) lancets     No current facility-administered medications for this visit.         ROS:  ROS is done and is negative for general/constitutional, eye, ENT, Respiratory, cardiovascular, GI, , Skin, musculoskeletal except as noted elsewhere.  All other review of systems negative except as noted elsewhere.    OBJECTIVE:  /79 (BP Location: Left arm, Patient Position: Sitting, Cuff Size: Adult Large)   Pulse 85   Temp 97.1  F (36.2  C) (Tympanic)   Resp 14   Wt 108.2 kg (238 lb 9.6 oz)   SpO2 96%   BMI 32.59 kg/m    GENERAL APPEARANCE: Alert, in no acute distress.  EYES: Conjunctivae clear.  Scalp: Mild tenderness to palpation in the posterior scalp with mild edema.  No crepitus and no gross deformities.  EARS: External ears normal. Canals clear. TMs normal.  No ortiz sign.  NOSE: Normal, no drainage.  OROPHARYNX: MMM.  Not erythamatous.  No exudate.  NECK: Supple, symmetrical, normal ROM without pain.  Specifically is able to look up and down and turn his head well past 45 degrees on both sides without pain.  RESP: no increased effort.  CV: good capillary refill.  ABDOMEN: nondistended.  SKIN: No ulcers, lesions or rash.  MUSCULOSKELETAL: No gross deformities.  NEURO: No gross deficits, CN 2-12 intact.  Normal gait, strength, tone.  Normal finger-nose-finger bilaterally.  Normal heel-to-shin bilaterally.  Negative Romberg.  Normal sensation to light touch over all  extremities.    RESULTS  No results found for any visits on 02/10/22.  No results found for this or any previous visit (from the past 48 hour(s)).    ASSESSMENT/PLAN:  (S09.90XA) Injury of head, initial encounter  (primary encounter diagnosis)  Comment: Presentation consistent with accidental fall causing minor external scalp injury.  No evidence of syncope, so no work-up for that indicated.  After extensive history and exam, there is no indication for intracranial or cervical spine imaging at this time as he has no focal deficits or other red flag symptoms.  He is not taking blood thinners aside from 81 mg ASA which does not qualify him for imaging by itself considering his otherwise very low risk presentation.  I did call the emergency department and discussed this with an emergency department doctor and they agreed that this seemed like a reasonable approach with discussing appropriate return precautions.  Discussed warning signs and return precautions and specifically discussed reasons to seek emergency department care.  He is confident that he understands these reasons and his wife is also a nurse and knows what to look for.  He will discuss this with her as well.  Plan: As above.    PPE worn: N95, goggles.    Options for treatment and follow-up care were reviewed with the patient and/or guardian. Ren Farrar and/or guardian engaged in the decision making process and verbalized understanding of the options discussed and agreed with the final plan.    See [facility] for orders, medications, letters, patient instructions    Anival Matta MD

## 2022-02-22 DIAGNOSIS — H40.1131 PRIMARY OPEN ANGLE GLAUCOMA OF BOTH EYES, MILD STAGE: ICD-10-CM

## 2022-02-22 RX ORDER — LATANOPROST 50 UG/ML
1 SOLUTION/ DROPS OPHTHALMIC AT BEDTIME
Qty: 7.5 ML | Refills: 0 | Status: SHIPPED | OUTPATIENT
Start: 2022-02-22

## 2022-02-22 NOTE — TELEPHONE ENCOUNTER
Continue Latanoprost (green top) at bedtime both eyes  (11/18/2021 visit).  Due for follow up around 07/18/2022.

## 2022-07-03 ENCOUNTER — HEALTH MAINTENANCE LETTER (OUTPATIENT)
Age: 62
End: 2022-07-03

## 2022-11-21 ENCOUNTER — ANCILLARY PROCEDURE (OUTPATIENT)
Dept: GENERAL RADIOLOGY | Facility: CLINIC | Age: 62
End: 2022-11-21
Attending: FAMILY MEDICINE
Payer: COMMERCIAL

## 2022-11-21 ENCOUNTER — OFFICE VISIT (OUTPATIENT)
Dept: FAMILY MEDICINE | Facility: CLINIC | Age: 62
End: 2022-11-21
Payer: COMMERCIAL

## 2022-11-21 VITALS
HEIGHT: 72 IN | BODY MASS INDEX: 31.97 KG/M2 | HEART RATE: 101 BPM | OXYGEN SATURATION: 97 % | SYSTOLIC BLOOD PRESSURE: 130 MMHG | TEMPERATURE: 97.6 F | WEIGHT: 236 LBS | DIASTOLIC BLOOD PRESSURE: 80 MMHG

## 2022-11-21 DIAGNOSIS — R60.9 DEPENDENT EDEMA: ICD-10-CM

## 2022-11-21 DIAGNOSIS — E78.5 HYPERLIPIDEMIA LDL GOAL <100: ICD-10-CM

## 2022-11-21 DIAGNOSIS — M79.652 PAIN OF LEFT THIGH: ICD-10-CM

## 2022-11-21 DIAGNOSIS — E11.65 TYPE 2 DIABETES MELLITUS WITH HYPERGLYCEMIA, WITHOUT LONG-TERM CURRENT USE OF INSULIN (H): Primary | ICD-10-CM

## 2022-11-21 DIAGNOSIS — Z23 NEED FOR VACCINATION: ICD-10-CM

## 2022-11-21 DIAGNOSIS — Z12.11 SCREEN FOR COLON CANCER: ICD-10-CM

## 2022-11-21 DIAGNOSIS — I10 ESSENTIAL HYPERTENSION WITH GOAL BLOOD PRESSURE LESS THAN 140/90: ICD-10-CM

## 2022-11-21 DIAGNOSIS — N52.1 ERECTILE DYSFUNCTION DUE TO DISEASES CLASSIFIED ELSEWHERE: ICD-10-CM

## 2022-11-21 LAB — HBA1C MFR BLD: 8 % (ref 0–5.6)

## 2022-11-21 PROCEDURE — 36415 COLL VENOUS BLD VENIPUNCTURE: CPT | Performed by: FAMILY MEDICINE

## 2022-11-21 PROCEDURE — 90472 IMMUNIZATION ADMIN EACH ADD: CPT | Performed by: FAMILY MEDICINE

## 2022-11-21 PROCEDURE — 90682 RIV4 VACC RECOMBINANT DNA IM: CPT | Performed by: FAMILY MEDICINE

## 2022-11-21 PROCEDURE — 90677 PCV20 VACCINE IM: CPT | Performed by: FAMILY MEDICINE

## 2022-11-21 PROCEDURE — 90471 IMMUNIZATION ADMIN: CPT | Performed by: FAMILY MEDICINE

## 2022-11-21 PROCEDURE — 91313 COVID-19,PF,MODERNA BIVALENT: CPT | Performed by: FAMILY MEDICINE

## 2022-11-21 PROCEDURE — 73552 X-RAY EXAM OF FEMUR 2/>: CPT | Mod: TC | Performed by: RADIOLOGY

## 2022-11-21 PROCEDURE — 80048 BASIC METABOLIC PNL TOTAL CA: CPT | Performed by: FAMILY MEDICINE

## 2022-11-21 PROCEDURE — 0134A COVID-19,PF,MODERNA BIVALENT: CPT | Performed by: FAMILY MEDICINE

## 2022-11-21 PROCEDURE — 99215 OFFICE O/P EST HI 40 MIN: CPT | Mod: 25 | Performed by: FAMILY MEDICINE

## 2022-11-21 PROCEDURE — 72170 X-RAY EXAM OF PELVIS: CPT | Mod: TC | Performed by: RADIOLOGY

## 2022-11-21 PROCEDURE — 83036 HEMOGLOBIN GLYCOSYLATED A1C: CPT | Performed by: FAMILY MEDICINE

## 2022-11-21 PROCEDURE — 80061 LIPID PANEL: CPT | Performed by: FAMILY MEDICINE

## 2022-11-21 PROCEDURE — 84460 ALANINE AMINO (ALT) (SGPT): CPT | Performed by: FAMILY MEDICINE

## 2022-11-21 RX ORDER — NIACIN 500 MG/1
2000 TABLET, EXTENDED RELEASE ORAL AT BEDTIME
Qty: 360 TABLET | Refills: 2 | Status: CANCELLED | OUTPATIENT
Start: 2022-11-21

## 2022-11-21 RX ORDER — METFORMIN HCL 500 MG
2000 TABLET, EXTENDED RELEASE 24 HR ORAL
Qty: 360 TABLET | Refills: 1 | Status: SHIPPED | OUTPATIENT
Start: 2022-11-21 | End: 2023-02-21

## 2022-11-21 RX ORDER — TADALAFIL 5 MG/1
5 TABLET ORAL EVERY 24 HOURS
Qty: 30 TABLET | Refills: 5 | Status: SHIPPED | OUTPATIENT
Start: 2022-11-21 | End: 2023-08-14

## 2022-11-21 RX ORDER — MELOXICAM 7.5 MG/1
7.5 TABLET ORAL DAILY
Qty: 30 TABLET | Refills: 0 | Status: CANCELLED | OUTPATIENT
Start: 2022-11-21

## 2022-11-21 RX ORDER — AMLODIPINE AND BENAZEPRIL HYDROCHLORIDE 5; 20 MG/1; MG/1
1 CAPSULE ORAL DAILY
Qty: 90 CAPSULE | Refills: 1 | Status: SHIPPED | OUTPATIENT
Start: 2022-11-21 | End: 2023-02-21

## 2022-11-21 RX ORDER — DULAGLUTIDE 1.5 MG/.5ML
1.5 INJECTION, SOLUTION SUBCUTANEOUS
Qty: 6.5 ML | Refills: 0 | Status: SHIPPED | OUTPATIENT
Start: 2022-11-21 | End: 2023-01-31

## 2022-11-21 RX ORDER — HYDROCHLOROTHIAZIDE 12.5 MG/1
25 TABLET ORAL DAILY
Qty: 180 TABLET | Refills: 1 | Status: SHIPPED | OUTPATIENT
Start: 2022-11-21 | End: 2023-02-21

## 2022-11-21 RX ORDER — LATANOPROST 50 UG/ML
1 SOLUTION/ DROPS OPHTHALMIC AT BEDTIME
Qty: 7.5 ML | Refills: 0 | Status: CANCELLED | OUTPATIENT
Start: 2022-11-21

## 2022-11-21 ASSESSMENT — PAIN SCALES - GENERAL: PAINLEVEL: SEVERE PAIN (7)

## 2022-11-21 NOTE — NURSING NOTE
Prior to immunization administration, verified patients identity using patient s name and date of birth. Please see Immunization Activity for additional information.     Screening Questionnaire for Adult Immunization    Are you sick today?   No   Do you have allergies to medications, food, a vaccine component or latex?   Yes   Have you ever had a serious reaction after receiving a vaccination?   No   Do you have a long-term health problem with heart, lung, kidney, or metabolic disease (e.g., diabetes), asthma, a blood disorder, no spleen, complement component deficiency, a cochlear implant, or a spinal fluid leak?  Are you on long-term aspirin therapy?   Yes   Do you have cancer, leukemia, HIV/AIDS, or any other immune system problem?   No   Do you have a parent, brother, or sister with an immune system problem?   No   In the past 3 months, have you taken medications that affect  your immune system, such as prednisone, other steroids, or anticancer drugs; drugs for the treatment of rheumatoid arthritis, Crohn s disease, or psoriasis; or have you had radiation treatments?   No   Have you had a seizure, or a brain or other nervous system problem?   No   During the past year, have you received a transfusion of blood or blood    products, or been given immune (gamma) globulin or antiviral drug?   No   For women: Are you pregnant or is there a chance you could become       pregnant during the next month?   No   Have you received any vaccinations in the past 4 weeks?   No     Immunization questionnaire was positive for at least one answer.  Notified Milagro.        Per orders of Dr. Humphrey, injection of pcv20, flublok, and moderna bivalent given by Madiha Schneider RN. Patient instructed to remain in clinic for 15 minutes afterwards, and to report any adverse reaction to me immediately.       Screening performed by Madiha Schneider RN on 11/21/2022 at 3:30 PM.

## 2022-11-21 NOTE — PROGRESS NOTES
Assessment & Plan     (E11.65) Type 2 diabetes mellitus with hyperglycemia, without long-term current use of insulin (H)  (primary encounter diagnosis)  Comment: uncontrolled. His last HgbA1c is up from last year.  Plan: HgbA1c, Urine micro albumin, Adult Eye  Referral, blood glucose (NO        BRAND SPECIFIED) test strip, blood glucose         calibration (NO BRAND SPECIFIED) solution,         blood glucose monitoring (NO BRAND SPECIFIED)         meter device kit, metFORMIN (GLUCOPHAGE XR) 500        MG 24 hr tablet, dulaglutide (TRULICITY) 1.5         MG/0.5ML pen        Increase Trulicity dose to 1.5 mg. Return in about 3 months (around 2/21/2023) for diabetes.      (M79.652) Pain of left thigh  Comment: No lesion of femur on XR. Probably due to hip arthritis, which looks mild on XR.  Plan: XR Femur Left 2 Views, XR Pelvis 1/2 Views,         Orthopedic  Referral            (E78.5) Hyperlipidemia LDL goal <100  Comment: historically at goal   Plan: Lipid panel, ALT    (I10) Essential hypertension with goal blood pressure less than 140/90  Comment: well controlled   Plan: BMP, amLODIPine-benazepril (LOTREL) 5-20 MG capsule,        hydrochlorothiazide (HYDRODIURIL) 12.5 MG         tablet            (R60.9) Dependent edema  Comment:   Plan: hydrochlorothiazide (HYDRODIURIL) 12.5 MG         tablet            (N52.1) Erectile dysfunction due to diseases classified elsewhere  Comment: not satisfied with alprostadil injections  Plan: tadalafil (CIALIS) 5 MG tablet        Patient wants to go back to tadalafil again. He would need to speak with his urologist regarding trying a different form of alprostadil.    (Z12.11) Screen for colon cancer  Comment:   Plan: Colonoscopy Screening  Referral            (Z23) Need for vaccination  Comment:   Plan: COVID-19,PF,MODERNA BIVALENT, Pneumococcal 20         Valent Conjugate (Prevnar 20), INFLUENZA QUAD,         RECOMBINANT, P-FREE (RIV4) (FLUBLOK) AGE  "50-64         [[id number]]                57 minutes (29 minutes with the patient) spent on the date of the encounter doing chart review, review of test results, patient visit and documentation         Humberto Humphrey MD  [facility] ALBERT Mcclure is a 62 year old, presenting for the following health issues:  Diabetes and Hypertension    The patient reports that he checks his blood sugar in the mornings. He reports that he gets readings anywhere from 140-200 most of the time.    History of Present Illness       Diabetes:   He presents for follow up of diabetes.  He is checking home blood glucose one time daily. He checks blood glucose before meals.  Blood glucose is sometimes over 200 and never under 70. When his blood glucose is low, the patient is asymptomatic for confusion, blurred vision, lethargy and reports not feeling dizzy, shaky, or weak.  He is concerned about blood sugar frequently over 200.  He is having weight gain. The patient has had a diabetic eye exam in the last 12 months.         Hypertension: He presents for follow up of hypertension.  He does check blood pressure  regularly outside of the clinic. Outpatient blood pressures have not been over 140/90. He follows a low salt diet.         Hyperlipidemia Follow-Up      Are you regularly taking any medication or supplement to lower your cholesterol?   Yes- Niaspan    Are you having muscle aches or other side effects that you think could be caused by your cholesterol lowering medication?  No    Patient reports pain that feels like it is \"in the bone\" of his left thigh, which has been going on for roughly 2 months. The pain is minimal while walking and the worst when he is using the stairs. He reports no pain in his hip joint or knee.    Review of Systems         Objective    /80 (BP Location: Left arm, Patient Position: Sitting, Cuff Size: Adult Large)   Pulse 101   Temp 97.6  F (36.4  C) (Tympanic)   Ht 1.822 " "m (5' 11.75\")   Wt 107 kg (236 lb)   SpO2 97%   BMI 32.23 kg/m    Body mass index is 32.23 kg/m .  Physical Exam   GENERAL: healthy, alert and no distress  EYES: Eyes grossly normal to inspection, PERRL, EOMI, sclerae white and conjunctivae normal  MS: no gross musculoskeletal defects noted, no edema. Normal gait, but he does express some discomfort standing up from the chair.  SKIN: no suspicious lesions or rashes to visible skin  NEURO: Normal strength and tone, sensory exam grossly normal, mentation intact, oriented times 3 and cranial nerves 2-12 intact  PSYCH: mentation appears normal, affect normal/bright           Lab Results   Component Value Date    A1C 8.0 11/21/2022    A1C 7.7 12/20/2021    A1C 9.2 08/11/2021    A1C 7.7 01/25/2021    A1C 8.1 09/01/2020    A1C 8.0 04/02/2020    A1C 7.3 10/24/2019    A1C 7.5 03/15/2019        A Hip and left femur X-Ray was ordered. My reading of this film is no santiago lesion of the femur. Mild degenerative changes of both hip joints. (No comparison films available: pending review by Radiologist.)           This document serves as a record of the services and decisions personally performed and made by Dr. Humphrey. It was created on his behalf by Fabricio Chavarria, a trained medical scribe. The creation of this document is based the provider's statements to the medical scribe.  Fabricio Chavarria,  4:32 PM    "

## 2022-11-22 LAB
ALT SERPL W P-5'-P-CCNC: 24 U/L (ref 0–70)
ANION GAP SERPL CALCULATED.3IONS-SCNC: 7 MMOL/L (ref 3–14)
BUN SERPL-MCNC: 18 MG/DL (ref 7–30)
CALCIUM SERPL-MCNC: 8.7 MG/DL (ref 8.5–10.1)
CHLORIDE BLD-SCNC: 104 MMOL/L (ref 94–109)
CHOLEST SERPL-MCNC: 168 MG/DL
CO2 SERPL-SCNC: 26 MMOL/L (ref 20–32)
CREAT SERPL-MCNC: 0.94 MG/DL (ref 0.66–1.25)
FASTING STATUS PATIENT QL REPORTED: YES
GFR SERPL CREATININE-BSD FRML MDRD: >90 ML/MIN/1.73M2
GLUCOSE BLD-MCNC: 176 MG/DL (ref 70–99)
HDLC SERPL-MCNC: 80 MG/DL
LDLC SERPL CALC-MCNC: 75 MG/DL
NONHDLC SERPL-MCNC: 88 MG/DL
POTASSIUM BLD-SCNC: 3.8 MMOL/L (ref 3.4–5.3)
SODIUM SERPL-SCNC: 137 MMOL/L (ref 133–144)
TRIGL SERPL-MCNC: 66 MG/DL

## 2022-12-16 ENCOUNTER — TELEPHONE (OUTPATIENT)
Dept: GASTROENTEROLOGY | Facility: CLINIC | Age: 62
End: 2022-12-16

## 2022-12-16 ENCOUNTER — HOSPITAL ENCOUNTER (OUTPATIENT)
Facility: AMBULATORY SURGERY CENTER | Age: 62
End: 2022-12-16
Attending: STUDENT IN AN ORGANIZED HEALTH CARE EDUCATION/TRAINING PROGRAM
Payer: COMMERCIAL

## 2022-12-16 NOTE — TELEPHONE ENCOUNTER
Screening Questions    BlueKIND OF PREP RedLOCATION [review exclusion criteria] GreenSEDATION TYPE    N Have you had a positive covid test in the last 2 weeks?   If yes, what date? Schedule out 14 days from symptoms  Y Your able to give consent for your medical care?  Y Are you active on mychart?   MULTI PLAN What insurance do you carry?    BERLIN DICKSON Ordering/Referring Provider:     32.9 BMI [BMI OVER 40-EXTENDED PREP]  BMI OVER 40 NEED PAC EVALUATION FOR [facility]    Respiratory Screening  [If yes to any of the following HOSPITAL setting only]     N      Do you use daily home oxygen?   NO PER PT     Do you have mod to severe Obstructive Sleep Apnea?  N      Do you have Pulmonary Hypertension? NEED PAC APPT AT [facility]    N      Do you have UNCONTROLLED asthma?      N Have you had a heart or lung transplant?       N Are you currently on dialysis? [If yes, G-PREP & HOSPITAL setting only]   N  Do you have chronic kidney disease? [If yes, G-PREP]  Y Do you have a diagnosis of diabetes?[If yes, G-PREP]    N Have you had a stroke or Transient ischemic attack (TIA - aka  mini stroke ) within 6 months? (If yes, please review exclusion criteria)  N  In the past 6 months, have you had any heart related issues including cardiomyopathy or heart attack?   N  If yes, did it require cardiac stenting or other implantable device?       N Do you have any implantable devices in your body (pacemaker, defib, LVAD)? (If yes, please review exclusion criteria)    N Do you take nitroglycerin?   N If yes, how often?  (If yes, HOSPITAL setting ONLY)  N Are you currently taking any blood thinners?           [IF YES, INFORM PATIENT TO FOLLOW UP W/ ORDERING PROVIDER FOR BRIDGING INSTRUCTIONS]     N  Do you take Phentermine?   Yes-> Hold for 7 days before procedure.  Please consult your prescribing provider if you have questions about holding  this medication.         [FEMALES] are you currently pregnant?       If yes, how many weeks? [Greater than 12 weeks, OR NEEDED]    N Any prescription pain medications taken daily like narcotics? [EXTENDED PREP AND MAC]    N  Any chemical dependencies such as alcohol, street drugs, or methadone? [If yes, MAC]    N Any post-traumatic stress syndrome, severe anxiety or history of psychosis? [If yes, MAC]    N Do you need help transferring? (If NO, please HOSPITAL setting  only)     N  On a regular basis do you go 3-5 days without a bowel movement?[ If yes, EXTENDED PREP.]     Preferred LOCAL Pharmacy for Pre Prescription:       [id number] [place], [place] - [address]                          Scheduling Details    Ren Caller:   (Please ask for phone number if not scheduled by patient)    Type of Procedure Scheduled: Lower Endoscopy [Colonoscopy]      STANDARD [facility]-If you answer yes to questions #8, #20, #21 Which Colonoscopy Prep was Sent:   MATEUSZ CF PATIENTS & GROEN'S PATIENTS NEEDS EXTENDED PREP    2/15 Date of Procedure:   KASANDRA Surgeon:    Location:      Sedation Type:     Conscious Sedation- Needs  for 6 hours after the procedure  MAC/General-Needs  for 24 hours after procedure    Y Informed patient they will need an adult          Cannot take any type of public or medical transportation alone    Y Confirmed Nurse will call to complete assessment      Pre-Procedure Covid test to be completed [[facility] PCR Testing Required]    DOUBLE CHECK BMI AND DARIA  NEEDS ADULT -CANNOT TAKE PUBILC OR MEDICAL TRANSPOTATION  COVID TEST FOR [facility] 3-4 DAYS  COIVD TEST FOR [facility] CAN BE HOME     Additional comments:

## 2023-01-27 DIAGNOSIS — E11.65 TYPE 2 DIABETES MELLITUS WITH HYPERGLYCEMIA, WITHOUT LONG-TERM CURRENT USE OF INSULIN (H): ICD-10-CM

## 2023-01-31 RX ORDER — DULAGLUTIDE 1.5 MG/.5ML
1.5 INJECTION, SOLUTION SUBCUTANEOUS
Qty: 2 ML | Refills: 0 | Status: SHIPPED | OUTPATIENT
Start: 2023-01-31 | End: 2023-02-21

## 2023-02-01 ENCOUNTER — TELEPHONE (OUTPATIENT)
Dept: FAMILY MEDICINE | Facility: CLINIC | Age: 63
End: 2023-02-01
Payer: COMMERCIAL

## 2023-02-01 NOTE — TELEPHONE ENCOUNTER
Patient Quality Outreach    Patient is due for the following:   Diabetes -  Eye Exam, Diabetic Follow-Up Visit and Foot Exam    Next Steps:   Patient has upcoming appointment, these items will be addressed at that time.    Type of outreach:    Chart review performed, no outreach needed.      Questions for provider review:    None     Josh Dick Jr, CNA

## 2023-02-06 ENCOUNTER — TELEPHONE (OUTPATIENT)
Dept: GASTROENTEROLOGY | Facility: CLINIC | Age: 63
End: 2023-02-06

## 2023-02-06 ENCOUNTER — TELEPHONE (OUTPATIENT)
Dept: GASTROENTEROLOGY | Facility: CLINIC | Age: 63
End: 2023-02-06
Payer: COMMERCIAL

## 2023-02-06 DIAGNOSIS — Z12.11 ENCOUNTER FOR SCREENING COLONOSCOPY: Primary | ICD-10-CM

## 2023-02-06 RX ORDER — BISACODYL 5 MG/1
TABLET, DELAYED RELEASE ORAL
Qty: 4 TABLET | Refills: 0 | Status: SHIPPED | OUTPATIENT
Start: 2023-02-06 | End: 2023-08-14

## 2023-02-06 NOTE — TELEPHONE ENCOUNTER
The patient inquired about rescheduling his colonoscopy to a different day, but determined that the original date, 2/15, would work for him after all.

## 2023-02-06 NOTE — TELEPHONE ENCOUNTER
Attempted to contact patient regarding upcoming Colonoscopy  procedure on 2.15.2023 for pre assessment questions.     Patient stated that the arrival time of 9:45 AM will not work with his work schedule.  Pt transferred to endo scheduling to r/s procedure.    Discuss Covid policy and designated  policy.    Pre op exam scheduled: N/A    Facility location: [facility]; [address], [place] [address]    Sedation type: Conscious sedation     Anticoagulants: No    Electronic implanted devices? No    Diabetic? Yes - Patient to hold oral diabetic medications day of procedure     DARIA? Yes, Severe DARIA    Indication for procedure: screening colonoscopy    Bowel prep recommendation: Standard Golytely d/t DM     Prep instructions sent via [employer].   [id number] - [place] - [address]      Catina Kline RN  Endoscopy Procedure Pre Assessment RN

## 2023-02-08 NOTE — TELEPHONE ENCOUNTER
Called the Pt to discuss below (second attempt). No answer. LM. Sadaf sent.     Per chart review, the Pt is going to keep the procedure date of 2/15/23.   We still need to go over PA with the Pt. Pt has DARIA (severe) on problems list. Will need to get a STOP/BANG and send staff message to provider.     Celena Mistry, RN   Endoscopy Procedure Pre Assessment RN

## 2023-02-09 NOTE — TELEPHONE ENCOUNTER
Performed STOP/BANG assessment. Pt scores a 5. Sleep study done in March 2010, diagnosed with severe DARIA. He does not use a C-pap machine. Will send staff message for anesthesiology review.     Pre assessment questions completed for upcoming Colonoscopy  procedure scheduled on 2/15/23    COVID policy reviewed.     Reviewed procedural arrival time 0945, procedure time 1030 and facility location [facility]; [address], [place] [address]    Designated  policy reviewed. Instructed to have someone stay CS hours post procedure.     Anticoagulation/blood thinners? No    Electronic implanted devices? No    Diabetic? Yes - Patient to hold oral diabetic medications day of procedure    Reviewed procedure prep instructions.      Patient verbalized understanding and had no questions or concerns at this time.    Celena Mistry RN  Endoscopy Procedure Pre Assessment RN

## 2023-02-10 ENCOUNTER — TELEPHONE (OUTPATIENT)
Dept: FAMILY MEDICINE | Facility: CLINIC | Age: 63
End: 2023-02-10
Payer: COMMERCIAL

## 2023-02-10 ENCOUNTER — TELEPHONE (OUTPATIENT)
Dept: GASTROENTEROLOGY | Facility: CLINIC | Age: 63
End: 2023-02-10
Payer: COMMERCIAL

## 2023-02-10 DIAGNOSIS — E11.65 TYPE 2 DIABETES MELLITUS WITH HYPERGLYCEMIA, WITHOUT LONG-TERM CURRENT USE OF INSULIN (H): ICD-10-CM

## 2023-02-10 NOTE — TELEPHONE ENCOUNTER
Called and relayed the below message to the Pt. Will need to be moved to the hospital. Pt expressed understanding and agrees with plan. Will send staff message to scheduling.         Per staff message:    Giacomo aSnchez MD Zilbauer, Rachel M RN; P  Anes Review  Due to untreated severe DARIA, should be scheduled at hospital.     However if you call him back could you let him know it appears that symptomatically he has improved and lost weight since his sleep study in 2010. If he were to get a repeat sleep study and/or start on CPAP he may be able to have procedures at  in the future, but that is very unlikely to be done by 2/15.     Scot Sanchez

## 2023-02-10 NOTE — TELEPHONE ENCOUNTER
Caller: Writer to patient  Reason for Reschedule/Cancellation (please be detailed, any staff messages or encounters to note?): Needs to be in hospital setting due to severe DARIA.     Celena Mistry RN  P Endoscopy Scheduling Pool; Celena Mistry RN  Hello scheduling team,     This patient will need to be moved to a hospital setting due to severe sleep apnea. I did call him and discussed this with him. Can you please cancel the procedure at  and call the Pt and reschedule in a hospital setting? Thank you!     HYUN Dallas       ----- Message -----   From: Giacomo Sanchez MD   Sent: 2/10/2023  12:44 PM CST   To: Celena Mistry RN,  Anes Review   Subject: RE: DARIA case review                               Due to untreated severe DARIA, should be scheduled at hospital.     However if you call him back could you let him know it appears that symptomatically he has improved and lost weight since his sleep study in 2010. If he were to get a repeat sleep study and/or start on CPAP he may be able to have procedures at  in the future, but that is very unlikely to be done by 2/15.     Scot Sanchez         Prior to reschedule please review:    Ordering Provider:BERLIN DICKSON    Sedation per order: Moderate    Does patient have any ASC Exclusions, please identify?: Y - DARIA      Notes on Cancelled Procedure:    Procedure:Lower Endoscopy [Colonoscopy]     Date: 2/16/2023    Location:[facility]; [address] [place] [address] [address], [place] [address]    Surgeon: Bigg        Rescheduled: ALL Steele and sent MyChart to call back.

## 2023-02-17 ENCOUNTER — OFFICE VISIT (OUTPATIENT)
Dept: OPHTHALMOLOGY | Facility: CLINIC | Age: 63
End: 2023-02-17
Attending: FAMILY MEDICINE
Payer: COMMERCIAL

## 2023-02-17 DIAGNOSIS — H52.03 HYPEROPIA OF BOTH EYES WITH REGULAR ASTIGMATISM AND PRESBYOPIA: ICD-10-CM

## 2023-02-17 DIAGNOSIS — H52.4 HYPEROPIA OF BOTH EYES WITH REGULAR ASTIGMATISM AND PRESBYOPIA: ICD-10-CM

## 2023-02-17 DIAGNOSIS — H52.223 HYPEROPIA OF BOTH EYES WITH REGULAR ASTIGMATISM AND PRESBYOPIA: ICD-10-CM

## 2023-02-17 DIAGNOSIS — H40.1131 PRIMARY OPEN ANGLE GLAUCOMA OF BOTH EYES, MILD STAGE: ICD-10-CM

## 2023-02-17 DIAGNOSIS — H25.813 COMBINED FORMS OF AGE-RELATED CATARACT OF BOTH EYES: ICD-10-CM

## 2023-02-17 DIAGNOSIS — E11.65 TYPE 2 DIABETES MELLITUS WITH HYPERGLYCEMIA, WITHOUT LONG-TERM CURRENT USE OF INSULIN (H): Primary | ICD-10-CM

## 2023-02-17 PROCEDURE — 92014 COMPRE OPH EXAM EST PT 1/>: CPT | Performed by: STUDENT IN AN ORGANIZED HEALTH CARE EDUCATION/TRAINING PROGRAM

## 2023-02-17 PROCEDURE — 92015 DETERMINE REFRACTIVE STATE: CPT | Performed by: STUDENT IN AN ORGANIZED HEALTH CARE EDUCATION/TRAINING PROGRAM

## 2023-02-17 ASSESSMENT — CUP TO DISC RATIO
OD_RATIO: 0.65
OS_RATIO: 0.7

## 2023-02-17 ASSESSMENT — REFRACTION_MANIFEST
OD_CYLINDER: +1.25
OS_ADD: +2.50
OD_SPHERE: +1.25
OS_SPHERE: +1.50
OD_ADD: +2.50
OD_AXIS: 160
OS_AXIS: 158
OS_CYLINDER: +1.00

## 2023-02-17 ASSESSMENT — REFRACTION_WEARINGRX
OD_SPHERE: +1.25
OD_AXIS: 159
OS_AXIS: 160
OS_ADD: +2.50
OD_CYLINDER: +1.25
OS_CYLINDER: +1.25
OS_SPHERE: +1.50
OD_ADD: +2.50

## 2023-02-17 ASSESSMENT — CONF VISUAL FIELD
OS_SUPERIOR_TEMPORAL_RESTRICTION: 0
OD_NORMAL: 1
OD_SUPERIOR_TEMPORAL_RESTRICTION: 0
OS_INFERIOR_NASAL_RESTRICTION: 0
OD_INFERIOR_NASAL_RESTRICTION: 0
OD_INFERIOR_TEMPORAL_RESTRICTION: 0
OS_SUPERIOR_NASAL_RESTRICTION: 0
OS_INFERIOR_TEMPORAL_RESTRICTION: 0
OD_SUPERIOR_NASAL_RESTRICTION: 0
OS_NORMAL: 1

## 2023-02-17 ASSESSMENT — TONOMETRY
IOP_METHOD: APPLANATION
OS_IOP_MMHG: 19
OD_IOP_MMHG: 19

## 2023-02-17 ASSESSMENT — EXTERNAL EXAM - LEFT EYE: OS_EXAM: NORMAL

## 2023-02-17 ASSESSMENT — VISUAL ACUITY
OD_CC+: -2
METHOD: SNELLEN - LINEAR
CORRECTION_TYPE: GLASSES
OS_PH_CC: 20/40
OS_CC+: -2
OD_CC: 20/30
OS_CC: 20/40
OS_PH_CC+: +2

## 2023-02-17 ASSESSMENT — SLIT LAMP EXAM - LIDS
COMMENTS: NORMAL
COMMENTS: NORMAL

## 2023-02-17 ASSESSMENT — EXTERNAL EXAM - RIGHT EYE: OD_EXAM: NORMAL

## 2023-02-17 NOTE — LETTER
2/17/2023         RE: Ren Farrar  [address]  [place] [address]        Dear Colleague,    Thank you for referring your patient, Ren Farrar, to the [facility]. Please see a copy of my visit note below.     Current Eye Medications:  Latanoprost at night in both eyes - Used around 2AM.      Subjective:  Here for diabetic eye exam.  Vision is stable at distance and near in both eyes.   No eye pain or discomfort.     Lab Results   Component Value Date    A1C 8.0 11/21/2022    A1C 7.7 12/20/2021    A1C 9.2 08/11/2021    A1C 7.7 01/25/2021    A1C 8.1 09/01/2020    A1C 8.0 04/02/2020    A1C 7.3 10/24/2019    A1C 7.5 03/15/2019      Objective:  See Ophthalmology Exam.      Assessment:  Ren Farrar is a 62 year old male who presents with:   Encounter Diagnoses   Name Primary?     Type 2 diabetes mellitus with hyperglycemia, without long-term current use of insulin (H) Negative diabetic retinopathy      Primary open angle glaucoma of both eyes, mild stage; s/p SLT #1 OD 8/2018 Intraocular pressure 19/19 today.      Combined forms of age-related cataract of both eyes      Hyperopia of both eyes with regular astigmatism and presbyopia        Plan:  Continue Latanoprost (green top) at bedtime both eyes     Keep blood sugars and blood pressure under good control.    Glasses prescription given - optional to update    Return in 3 months for glaucoma testing - call number below my name to set up the appointment    Katherine Danielson MD  [phone]            Again, thank you for allowing me to participate in the care of your patient.        Sincerely,        Katherine Danielson MD

## 2023-02-17 NOTE — PATIENT INSTRUCTIONS
Continue Latanoprost (green top) at bedtime both eyes     Keep blood sugars and blood pressure under good control.    Glasses prescription given - optional to update    Return in 3 months for glaucoma testing - call number below my name to set up the appointment    Katherine Danielson MD  [phone]

## 2023-02-17 NOTE — PROGRESS NOTES
Current Eye Medications:  Latanoprost at night in both eyes - Used around 2AM.      Subjective:  Here for diabetic eye exam.  Vision is stable at distance and near in both eyes.   No eye pain or discomfort.     Lab Results   Component Value Date    A1C 8.0 11/21/2022    A1C 7.7 12/20/2021    A1C 9.2 08/11/2021    A1C 7.7 01/25/2021    A1C 8.1 09/01/2020    A1C 8.0 04/02/2020    A1C 7.3 10/24/2019    A1C 7.5 03/15/2019      Objective:  See Ophthalmology Exam.      Assessment:  Ren Farrar is a 62 year old male who presents with:   Encounter Diagnoses   Name Primary?     Type 2 diabetes mellitus with hyperglycemia, without long-term current use of insulin (H) Negative diabetic retinopathy      Primary open angle glaucoma of both eyes, mild stage; s/p SLT #1 OD 8/2018 Intraocular pressure 19/19 today.      Combined forms of age-related cataract of both eyes      Hyperopia of both eyes with regular astigmatism and presbyopia        Plan:  Continue Latanoprost (green top) at bedtime both eyes     Keep blood sugars and blood pressure under good control.    Glasses prescription given - optional to update    Return in 3 months for glaucoma testing - call number below my name to set up the appointment    Katherine Danielson MD  [phone]

## 2023-02-21 ENCOUNTER — OFFICE VISIT (OUTPATIENT)
Dept: FAMILY MEDICINE | Facility: CLINIC | Age: 63
End: 2023-02-21
Payer: COMMERCIAL

## 2023-02-21 VITALS
HEIGHT: 72 IN | OXYGEN SATURATION: 100 % | SYSTOLIC BLOOD PRESSURE: 112 MMHG | HEART RATE: 81 BPM | RESPIRATION RATE: 20 BRPM | BODY MASS INDEX: 31.21 KG/M2 | WEIGHT: 230.4 LBS | TEMPERATURE: 98.2 F | DIASTOLIC BLOOD PRESSURE: 74 MMHG

## 2023-02-21 DIAGNOSIS — Z12.11 COLON CANCER SCREENING: ICD-10-CM

## 2023-02-21 DIAGNOSIS — E11.65 TYPE 2 DIABETES MELLITUS WITH HYPERGLYCEMIA, WITHOUT LONG-TERM CURRENT USE OF INSULIN (H): Primary | ICD-10-CM

## 2023-02-21 DIAGNOSIS — I10 ESSENTIAL HYPERTENSION WITH GOAL BLOOD PRESSURE LESS THAN 140/90: ICD-10-CM

## 2023-02-21 DIAGNOSIS — E78.5 HYPERLIPIDEMIA LDL GOAL <100: ICD-10-CM

## 2023-02-21 DIAGNOSIS — R60.9 DEPENDENT EDEMA: ICD-10-CM

## 2023-02-21 LAB
GLUCOSE BLD-MCNC: 108 MG/DL (ref 60–99)
HBA1C MFR BLD: 7.1 % (ref 0–5.6)
HOLD SPECIMEN: NORMAL

## 2023-02-21 PROCEDURE — 99214 OFFICE O/P EST MOD 30 MIN: CPT | Performed by: FAMILY MEDICINE

## 2023-02-21 PROCEDURE — 99207 PR FOOT EXAM NO CHARGE: CPT | Performed by: FAMILY MEDICINE

## 2023-02-21 PROCEDURE — 82947 ASSAY GLUCOSE BLOOD QUANT: CPT | Performed by: FAMILY MEDICINE

## 2023-02-21 PROCEDURE — 83036 HEMOGLOBIN GLYCOSYLATED A1C: CPT | Performed by: FAMILY MEDICINE

## 2023-02-21 PROCEDURE — 36415 COLL VENOUS BLD VENIPUNCTURE: CPT | Performed by: FAMILY MEDICINE

## 2023-02-21 RX ORDER — DULAGLUTIDE 1.5 MG/.5ML
1.5 INJECTION, SOLUTION SUBCUTANEOUS
Qty: 6 ML | Refills: 1 | Status: SHIPPED | OUTPATIENT
Start: 2023-02-21 | End: 2023-04-25

## 2023-02-21 RX ORDER — HYDROCHLOROTHIAZIDE 12.5 MG/1
25 TABLET ORAL DAILY
Qty: 180 TABLET | Refills: 1 | Status: SHIPPED | OUTPATIENT
Start: 2023-02-21 | End: 2023-07-21

## 2023-02-21 RX ORDER — AMLODIPINE AND BENAZEPRIL HYDROCHLORIDE 5; 20 MG/1; MG/1
1 CAPSULE ORAL DAILY
Qty: 90 CAPSULE | Refills: 1 | Status: SHIPPED | OUTPATIENT
Start: 2023-02-21 | End: 2023-08-14

## 2023-02-21 RX ORDER — METFORMIN HCL 500 MG
2000 TABLET, EXTENDED RELEASE 24 HR ORAL
Qty: 360 TABLET | Refills: 1 | Status: SHIPPED | OUTPATIENT
Start: 2023-02-21 | End: 2023-07-21

## 2023-02-21 RX ORDER — NIACIN 500 MG/1
2000 TABLET, EXTENDED RELEASE ORAL AT BEDTIME
Qty: 360 TABLET | Refills: 1 | Status: SHIPPED | OUTPATIENT
Start: 2023-02-21 | End: 2023-08-14

## 2023-02-21 ASSESSMENT — PAIN SCALES - GENERAL: PAINLEVEL: NO PAIN (0)

## 2023-02-21 NOTE — PATIENT INSTRUCTIONS
At [facility], we strive to deliver an exceptional experience to you, every time we see you. If you receive a survey, please complete it as we do value your feedback.  If you have MyChart, you can expect to receive results automatically within 24 hours of their completion.  Your provider will send a note interpreting your results as well.   If you do not have MyChart, you should receive your results in about a week by mail.    Your care team:                            Family Medicine Internal Medicine   MD Mendoza Gates MD Shantel Branch-Fleming, MD Srinivasa Vaka, MD Katya Belousova, PAKOMAL Gonzalez CNP, MD ([facility]   Zan Velazquez, MD Imelda Sanchez MD Amelia Massimini APRN MYRON Major APRN MD Brandan Araujo MD          Clinic hours: Monday - Thursday 7 am-6 pm; Fridays 7 am-5 pm.   Urgent care: Monday - Friday 10 am- 8 pm; Saturday and Sunday 9 am-5 pm.    Clinic: [phone]       [facility]: Monday - Thursday 8 am - 7 pm; Friday 8 am - 6 pm  [facility]: [phone]

## 2023-02-21 NOTE — TELEPHONE ENCOUNTER
Called pharmacy and confirmed pt's insurance. Pharmacy ran script through insurance again and their system is still stating that script needs a PA. Provider please advise on PA request

## 2023-02-21 NOTE — TELEPHONE ENCOUNTER
Epic indicates that Trulicity is covered by his insurance. Please contact the pharmacy to let them know that they need to update his insurance info.

## 2023-02-21 NOTE — PROGRESS NOTES
Assessment & Plan     (E11.65) Type 2 diabetes mellitus with hyperglycemia, without long-term current use of insulin (H)  (primary encounter diagnosis)  Comment: well controlled, despite some recent issues regarding his supply of Trulicity (confusion regarding coverage).  Plan: FOOT EXAM, metFORMIN (GLUCOPHAGE XR) 500 MG 24         hr tablet        Continue current regimen. Consider increasing Trulicity dose. Return in about 5 months (around 8/7/2023) for diabetes, full physical.      (I10) Essential hypertension with goal blood pressure less than 140/90  Comment: well controlled   Plan: amLODIPine-benazepril (LOTREL) 5-20 MG capsule,        hydrochlorothiazide (HYDRODIURIL) 12.5 MG         tablet            (R60.9) Dependent edema  Comment: prescription update   Plan: hydrochlorothiazide (HYDRODIURIL) 12.5 MG         tablet            (E78.5) Hyperlipidemia LDL goal <100  Comment: historically at goal   Plan: niacin ER (NIASPAN) 500 MG CR tablet        Recheck lipids in 6 months        Humberto Humphrey MD  [facility]    Richard Mcclure is a 62 year old, presenting for the following health issues:  Diabetes      History of Present Illness       Diabetes:   He presents for follow up of diabetes.  He is checking home blood glucose one time daily. He checks blood glucose before and after meals.  Blood glucose is sometimes over 200 and sometimes under 70. He is aware of hypoglycemia symptoms including shakiness. He has no concerns regarding his diabetes at this time.  He is not experiencing numbness or burning in feet, excessive thirst, blurry vision, weight changes or redness, sores or blisters on feet.         Hyperlipidemia:  He presents for follow up of hyperlipidemia.  He is taking medication to lower cholesterol. He is not having myalgia or other side effects to statin medications.    Hypertension: He presents for follow up of hypertension.  He does check blood pressure  regularly  outside of the clinic. Outpatient blood pressures have not been over 140/90. He does not follow a low salt diet.     He eats 2-3 servings of fruits and vegetables daily.He consumes 0 sweetened beverage(s) daily.He exercises with enough effort to increase his heart rate 20 to 29 minutes per day.  He exercises with enough effort to increase his heart rate 4 days per week. He is missing 1 dose(s) of medications per week.  He is not taking prescribed medications regularly due to remembering to take.     The patient reports that he checks his blood glucose once daily, and usually gets around 140. He does note one occasion when he woke up during the night feeling shaky, checked his blood glucose, and received a reading of 59. He also adds that there has been difficulty getting his Trulicity filled through [facility] recently due to insurance confusion.        Review of Systems         Objective    /74 (BP Location: Left arm, Patient Position: Sitting, Cuff Size: Adult Regular)   Pulse 81   Temp 98.2  F (36.8  C) (Temporal)   Resp 20   Ht 1.829 m (6')   Wt 104.5 kg (230 lb 6.4 oz)   SpO2 100%   BMI 31.25 kg/m    Body mass index is 31.25 kg/m .  Physical Exam   GENERAL: healthy, alert and no distress  EYES: Eyes grossly normal to inspection, PERRL, EOMI, sclerae white and conjunctivae normal  MS: no gross musculoskeletal defects noted, no edema  SKIN: no suspicious lesions or rashes to visible skin  NEURO: Normal strength and tone, sensory exam grossly normal, mentation intact, oriented times 3 and cranial nerves 2-12 intact  PSYCH: mentation appears normal, affect normal/bright   Diabetic foot exam: normal DP and PT pulses, no trophic changes or ulcerative lesions, normal sensory exam and normal monofilament exam       Results for orders placed or performed in visit on 02/21/23 (from the past 24 hour(s))   Hemoglobin A1c   Result Value Ref Range    Hemoglobin A1C 7.1 (H) 0.0 - 5.6 %   Glucose whole blood   Result  Value Ref Range    Glucose Whole Blood 108 (H) 60 - 99 mg/dL   Extra Tube    Narrative    The following orders were created for panel order Extra Tube.  Procedure                               Abnormality         Status                     ---------                               -----------         ------                     Extra Red Top Tube[[id number]]                               In process                 Extra Green Top (Lithium...[[id number]]                      In process                 Extra Purple Top Tube[[id number]]                            In process                   Please view results for these tests on the individual orders.     Lab Results   Component Value Date    A1C 7.1 02/21/2023    A1C 8.0 11/21/2022    A1C 7.7 12/20/2021    A1C 9.2 08/11/2021    A1C 7.7 01/25/2021    A1C 8.1 09/01/2020    A1C 8.0 04/02/2020    A1C 7.3 10/24/2019    A1C 7.5 03/15/2019                  This document serves as a record of the services and decisions personally performed and made by Dr. Humphrey. It was created on his behalf by Fabricio Chavarria, a trained medical scribe. The creation of this document is based the provider's statements to the medical scribe.  Fabricio Chavarria,  12:18 PM

## 2023-03-03 ENCOUNTER — TELEPHONE (OUTPATIENT)
Dept: FAMILY MEDICINE | Facility: CLINIC | Age: 63
End: 2023-03-03
Payer: COMMERCIAL

## 2023-03-03 DIAGNOSIS — E11.65 TYPE 2 DIABETES MELLITUS WITH HYPERGLYCEMIA, WITHOUT LONG-TERM CURRENT USE OF INSULIN (H): ICD-10-CM

## 2023-03-03 PROCEDURE — 82274 ASSAY TEST FOR BLOOD FECAL: CPT | Performed by: FAMILY MEDICINE

## 2023-03-03 NOTE — TELEPHONE ENCOUNTER
Pharmacy claim for Accu-Chek guide strips has been rejected and requires prior authorization     To complete prior authorization     [employer]/login    Key [id number]  Last name Charan  [place] 1960

## 2023-03-06 LAB — HEMOCCULT STL QL IA: NEGATIVE

## 2023-03-06 NOTE — TELEPHONE ENCOUNTER
See encounter dated 02/21/2023.  PA has been completed and approved. Patient will have a $35 copay.

## 2023-03-06 NOTE — TELEPHONE ENCOUNTER
Per Dr Humphrey:     Please do one of the followin. PA  2. Or have the patient fill the glucose monitoring prescriptions again (they were ordered in November and do not specify a specific brand). The pharmacy should provide whichever is preferred by his insurance.

## 2023-03-08 NOTE — TELEPHONE ENCOUNTER
Prior Authorization Not Needed per Insurance    Medication: Accu-Chek Guide strips  Insurance Company: [employer] ([facility]) - Phone [phone] Fax [phone]  Expected CoPay:      Pharmacy Filling the Rx: [id number] - [place], [place] - [address]  Pharmacy Notified: Yes  Patient Notified: Yes    Spoke with pharmacy, Accu-Chek Guide processed through insurance and patient has already picked this up. No further PA needed.

## 2023-04-25 ENCOUNTER — MYC MEDICAL ADVICE (OUTPATIENT)
Dept: FAMILY MEDICINE | Facility: CLINIC | Age: 63
End: 2023-04-25
Payer: COMMERCIAL

## 2023-04-25 DIAGNOSIS — E11.65 TYPE 2 DIABETES MELLITUS WITH HYPERGLYCEMIA, WITHOUT LONG-TERM CURRENT USE OF INSULIN (H): ICD-10-CM

## 2023-04-25 RX ORDER — DULAGLUTIDE 1.5 MG/.5ML
1.5 INJECTION, SOLUTION SUBCUTANEOUS
Qty: 6 ML | Refills: 1 | Status: SHIPPED | OUTPATIENT
Start: 2023-04-25 | End: 2023-11-13

## 2023-06-01 ENCOUNTER — TRANSFERRED RECORDS (OUTPATIENT)
Dept: MULTI SPECIALTY CLINIC | Facility: CLINIC | Age: 63
End: 2023-06-01

## 2023-06-01 LAB — RETINOPATHY: NORMAL

## 2023-06-13 ENCOUNTER — TELEPHONE (OUTPATIENT)
Dept: FAMILY MEDICINE | Facility: CLINIC | Age: 63
End: 2023-06-13
Payer: COMMERCIAL

## 2023-06-13 NOTE — LETTER
June 13, 2023    Ren Farrar  [address]  [place] [address]    Dear Ren,    At [facility] we care about your health and are committed to providing quality patient care.     Here is a list of Health Maintenance topics that are due now or due soon:  Health Maintenance Due   Topic Date Due    MICROALBUMIN  10/24/2020    FALL RISK ASSESSMENT  Never done    AORTIC ANEURYSM SCREENING (SYSTEM ASSIGNED)  Never done    COVID-19 Vaccine (5 - Moderna series) 06/08/2023    MEDICARE ANNUAL WELLNESS VISIT  06/08/2023        We are recommending that you:  Schedule a WELLNESS (Preventative/Physical) APPOINTMENT with your primary care provider. If you go elsewhere for your wellness appointments then please disregard this reminder     and   Schedule a Nurse-Only appointment to update your immunizations: Your records indicate that you are not up to date with your immunizations, please schedule a nurse-only appointment to get these updated or update them at your next office visit. If this is incorrect, please disregard.    To schedule an appointment or discuss this further, you may contact us by phone at the [facility] at [phone] or online through the patient [email or web address] @ [email or web address]    Thank you for trusting [facility] and we appreciate the opportunity to serve you.  We look forward to supporting your healthcare needs in the future.    Your partners in health,      Quality Committee at [facility]

## 2023-06-13 NOTE — TELEPHONE ENCOUNTER
Patient Quality Outreach    Patient is due for the following:   Physical Annual Wellness Visit      Topic Date Due     COVID-19 Vaccine (5 - Moderna series) 06/08/2023       Next Steps:   Schedule a Annual Wellness Visit    Type of outreach:    Sent [employer] message.      Questions for provider review:    None           George Grijalva MA

## 2023-06-28 ENCOUNTER — IMMUNIZATION (OUTPATIENT)
Dept: NURSING | Facility: CLINIC | Age: 63
End: 2023-06-28
Payer: COMMERCIAL

## 2023-06-28 PROCEDURE — 91313 COVID-19 BIVALENT 18+ (MODERNA): CPT

## 2023-06-28 PROCEDURE — 0134A COVID-19 BIVALENT 18+ (MODERNA): CPT

## 2023-07-10 ENCOUNTER — ALLIED HEALTH/NURSE VISIT (OUTPATIENT)
Dept: FAMILY MEDICINE | Facility: CLINIC | Age: 63
End: 2023-07-10
Payer: COMMERCIAL

## 2023-07-10 DIAGNOSIS — Z23 NEED FOR VACCINATION: Primary | ICD-10-CM

## 2023-07-10 PROCEDURE — 90471 IMMUNIZATION ADMIN: CPT

## 2023-07-10 PROCEDURE — 90707 MMR VACCINE SC: CPT

## 2023-07-10 PROCEDURE — 99207 PR NO CHARGE NURSE ONLY: CPT

## 2023-07-10 NOTE — NURSING NOTE
Prior to immunization administration, verified patients identity using patient s name and date of birth. Please see Immunization Activity for additional information.     Screening Questionnaire for Adult Immunization    Are you sick today?   No   Do you have allergies to medications, food, a vaccine component or latex?   Yes   Have you ever had a serious reaction after receiving a vaccination?   No   Do you have a long-term health problem with heart, lung, kidney, or metabolic disease (e.g., diabetes), asthma, a blood disorder, no spleen, complement component deficiency, a cochlear implant, or a spinal fluid leak?  Are you on long-term aspirin therapy?   Yes   Do you have cancer, leukemia, HIV/AIDS, or any other immune system problem?   No   Do you have a parent, brother, or sister with an immune system problem?   No   In the past 3 months, have you taken medications that affect  your immune system, such as prednisone, other steroids, or anticancer drugs; drugs for the treatment of rheumatoid arthritis, Crohn s disease, or psoriasis; or have you had radiation treatments?   No   Have you had a seizure, or a brain or other nervous system problem?   No   During the past year, have you received a transfusion of blood or blood    products, or been given immune (gamma) globulin or antiviral drug?   No   For women: Are you pregnant or is there a chance you could become       pregnant during the next month?   No   Have you received any vaccinations in the past 4 weeks?   No     Immunization questionnaire was positive for at least one answer.  Notified Dr. Humphrey.    I have reviewed the following standing orders:   This patient is due and qualifies for the MMR vaccine.    Click here for full standing order document: MMR Adult Standing Order     I have reviewed the vaccines inclusion and exclusion criteria;There were concerns regarding the following exclusions, not on care gaps. I have brought them to a provider who approved  vaccination.        Patient instructed to remain in clinic for 15 minutes afterwards, and to report any adverse reactions.     Screening performed by George Grijalva MA on 7/10/2023 at 3:58 PM.

## 2023-07-16 ENCOUNTER — HEALTH MAINTENANCE LETTER (OUTPATIENT)
Age: 63
End: 2023-07-16

## 2023-07-21 DIAGNOSIS — I10 ESSENTIAL HYPERTENSION WITH GOAL BLOOD PRESSURE LESS THAN 140/90: ICD-10-CM

## 2023-07-21 DIAGNOSIS — R60.9 DEPENDENT EDEMA: ICD-10-CM

## 2023-07-21 DIAGNOSIS — E11.65 TYPE 2 DIABETES MELLITUS WITH HYPERGLYCEMIA, WITHOUT LONG-TERM CURRENT USE OF INSULIN (H): ICD-10-CM

## 2023-07-21 RX ORDER — METFORMIN HCL 500 MG
2000 TABLET, EXTENDED RELEASE 24 HR ORAL
Qty: 360 TABLET | Refills: 0 | Status: SHIPPED | OUTPATIENT
Start: 2023-07-21 | End: 2023-08-14

## 2023-07-21 RX ORDER — HYDROCHLOROTHIAZIDE 12.5 MG/1
25 TABLET ORAL DAILY
Qty: 180 TABLET | Refills: 1 | Status: SHIPPED | OUTPATIENT
Start: 2023-07-21 | End: 2024-03-12

## 2023-08-14 ENCOUNTER — OFFICE VISIT (OUTPATIENT)
Dept: FAMILY MEDICINE | Facility: CLINIC | Age: 63
End: 2023-08-14
Payer: COMMERCIAL

## 2023-08-14 VITALS
BODY MASS INDEX: 31.42 KG/M2 | TEMPERATURE: 97.5 F | HEIGHT: 72 IN | DIASTOLIC BLOOD PRESSURE: 70 MMHG | HEART RATE: 82 BPM | SYSTOLIC BLOOD PRESSURE: 110 MMHG | RESPIRATION RATE: 16 BRPM | OXYGEN SATURATION: 99 % | WEIGHT: 232 LBS

## 2023-08-14 DIAGNOSIS — Z00.00 ENCOUNTER FOR MEDICARE ANNUAL WELLNESS EXAM: Primary | ICD-10-CM

## 2023-08-14 DIAGNOSIS — E78.5 HYPERLIPIDEMIA LDL GOAL <100: ICD-10-CM

## 2023-08-14 DIAGNOSIS — E11.9 TYPE 2 DIABETES MELLITUS WITHOUT COMPLICATION, WITHOUT LONG-TERM CURRENT USE OF INSULIN (H): Primary | ICD-10-CM

## 2023-08-14 DIAGNOSIS — I10 ESSENTIAL HYPERTENSION WITH GOAL BLOOD PRESSURE LESS THAN 140/90: ICD-10-CM

## 2023-08-14 DIAGNOSIS — Z13.6 SCREENING FOR AAA (ABDOMINAL AORTIC ANEURYSM): ICD-10-CM

## 2023-08-14 DIAGNOSIS — E11.9 TYPE 2 DIABETES MELLITUS WITHOUT COMPLICATION, WITHOUT LONG-TERM CURRENT USE OF INSULIN (H): ICD-10-CM

## 2023-08-14 DIAGNOSIS — Z12.5 PROSTATE CANCER SCREENING: ICD-10-CM

## 2023-08-14 LAB
ALT SERPL W P-5'-P-CCNC: 18 U/L (ref 0–70)
CHOLEST SERPL-MCNC: 159 MG/DL
CREAT SERPL-MCNC: 0.94 MG/DL (ref 0.67–1.17)
GFR SERPL CREATININE-BSD FRML MDRD: 90 ML/MIN/1.73M2
HBA1C MFR BLD: 7.5 % (ref 0–5.6)
HDLC SERPL-MCNC: 70 MG/DL
LDLC SERPL CALC-MCNC: 79 MG/DL
NONHDLC SERPL-MCNC: 89 MG/DL
POTASSIUM SERPL-SCNC: 3.8 MMOL/L (ref 3.4–5.3)
PSA SERPL DL<=0.01 NG/ML-MCNC: 0.62 NG/ML (ref 0–4.5)
TRIGL SERPL-MCNC: 52 MG/DL

## 2023-08-14 PROCEDURE — 36415 COLL VENOUS BLD VENIPUNCTURE: CPT | Performed by: FAMILY MEDICINE

## 2023-08-14 PROCEDURE — 82043 UR ALBUMIN QUANTITATIVE: CPT | Performed by: FAMILY MEDICINE

## 2023-08-14 PROCEDURE — 82570 ASSAY OF URINE CREATININE: CPT | Performed by: FAMILY MEDICINE

## 2023-08-14 PROCEDURE — 99397 PER PM REEVAL EST PAT 65+ YR: CPT | Performed by: FAMILY MEDICINE

## 2023-08-14 PROCEDURE — 84132 ASSAY OF SERUM POTASSIUM: CPT | Performed by: FAMILY MEDICINE

## 2023-08-14 PROCEDURE — 83036 HEMOGLOBIN GLYCOSYLATED A1C: CPT | Performed by: FAMILY MEDICINE

## 2023-08-14 PROCEDURE — 84460 ALANINE AMINO (ALT) (SGPT): CPT | Performed by: FAMILY MEDICINE

## 2023-08-14 PROCEDURE — 80061 LIPID PANEL: CPT | Performed by: FAMILY MEDICINE

## 2023-08-14 PROCEDURE — 99214 OFFICE O/P EST MOD 30 MIN: CPT | Mod: 25 | Performed by: FAMILY MEDICINE

## 2023-08-14 PROCEDURE — G0103 PSA SCREENING: HCPCS | Performed by: FAMILY MEDICINE

## 2023-08-14 PROCEDURE — 82565 ASSAY OF CREATININE: CPT | Performed by: FAMILY MEDICINE

## 2023-08-14 RX ORDER — AMLODIPINE AND BENAZEPRIL HYDROCHLORIDE 5; 20 MG/1; MG/1
1 CAPSULE ORAL DAILY
Qty: 90 CAPSULE | Refills: 1 | Status: SHIPPED | OUTPATIENT
Start: 2023-08-14 | End: 2024-03-13

## 2023-08-14 RX ORDER — NIACIN 500 MG/1
2000 TABLET, EXTENDED RELEASE ORAL AT BEDTIME
Qty: 360 TABLET | Refills: 1 | Status: SHIPPED | OUTPATIENT
Start: 2023-08-14 | End: 2024-03-13

## 2023-08-14 RX ORDER — DULAGLUTIDE 3 MG/.5ML
3 INJECTION, SOLUTION SUBCUTANEOUS
Qty: 6 ML | Refills: 1 | Status: SHIPPED | OUTPATIENT
Start: 2023-08-14 | End: 2024-04-22

## 2023-08-14 RX ORDER — METFORMIN HCL 500 MG
2000 TABLET, EXTENDED RELEASE 24 HR ORAL
Qty: 360 TABLET | Refills: 0 | Status: SHIPPED | OUTPATIENT
Start: 2023-08-14 | End: 2024-03-13

## 2023-08-14 ASSESSMENT — ENCOUNTER SYMPTOMS
DIZZINESS: 0
CONSTIPATION: 0
NERVOUS/ANXIOUS: 0
HEADACHES: 0
HEARTBURN: 0
DIARRHEA: 0
SORE THROAT: 0
HEMATOCHEZIA: 0
CHILLS: 0
WEAKNESS: 0
PALPITATIONS: 0
ARTHRALGIAS: 0
NAUSEA: 0
JOINT SWELLING: 0
DYSURIA: 0
FREQUENCY: 0
HEMATURIA: 0
ABDOMINAL PAIN: 0
MYALGIAS: 0
EYE PAIN: 0
FEVER: 0
PARESTHESIAS: 0
COUGH: 0
SHORTNESS OF BREATH: 0

## 2023-08-14 ASSESSMENT — PAIN SCALES - GENERAL: PAINLEVEL: NO PAIN (0)

## 2023-08-14 ASSESSMENT — ACTIVITIES OF DAILY LIVING (ADL): CURRENT_FUNCTION: NO ASSISTANCE NEEDED

## 2023-08-14 NOTE — PROGRESS NOTES
"SUBJECTIVE:   Ren is a 63 year old who presents for Preventive Visit.      Are you in the first 12 months of your Medicare coverage?  Yes,  Visual Acuity:  Right Eye: 20/40   Left Eye: 20/50  Both Eyes: 20/25 with glasses    Healthy Habits:     In general, how would you rate your overall health?  Good    Frequency of exercise:  1 day/week    Duration of exercise:  45-60 minutes    Do you usually eat at least 4 servings of fruit and vegetables a day, include whole grains    & fiber and avoid regularly eating high fat or \"junk\" foods?  Yes    Taking medications regularly:  Yes    Medication side effects:  None    Ability to successfully perform activities of daily living:  No assistance needed    Home Safety:  Throw rugs in the hallway    Hearing Impairment:  No hearing concerns    In the past 6 months, have you been bothered by leaking of urine?  No    In general, how would you rate your overall mental or emotional health?  Good    Additional concerns today:  No        Have you ever done Advance Care Planning? (For example, a Health Directive, POLST, or a discussion with a medical provider or your loved ones about your wishes): No, advance care planning information given to patient to review.  Advanced care planning was discussed at today's visit.       Fall risk  Fallen 2 or more times in the past year?: No  Any fall with injury in the past year?: No    Cognitive Screening   1) Repeat 3 items (Leader, Season, Table)  Yes  2) Clock draw: NORMAL  3) 3 item recall: Recalls NO objects   Results:  Normal Clock zero items     Mini-CogTM Copyright AMBER Casillas. Licensed by the author for use in [facility]; reprinted with permission (allison@.[facility]). All rights reserved.      Do you have sleep apnea, excessive snoring or daytime drowsiness? : yes     Social History     Tobacco Use    Smoking status: Never    Smokeless tobacco: Never   Substance Use Topics    Alcohol use: Yes     Comment: occassioanally             " 8/14/2023     2:06 PM   Alcohol Use   Prescreen: >3 drinks/day or >7 drinks/week? No     Do you have a current opioid prescription? No  Do you use any other controlled substances or medications that are not prescribed by a provider? None      BP Readings from Last 2 Encounters:   08/14/23 110/70   02/21/23 112/74     Hemoglobin A1C (%)   Date Value   08/14/2023 7.5 (H)   02/21/2023 7.1 (H)   01/25/2021 7.7 (H)   09/01/2020 8.1 (H)     LDL Cholesterol Calculated (mg/dL)   Date Value   11/21/2022 75   08/11/2021 96   09/01/2020 84   04/02/2020 82           Current providers sharing in care for this patient include:   Patient Care Team:  Humberto Humphrey MD as PCP - General  Humberto Humphrey MD as Assigned PCP  Osito Duffy MD as MD (Endocrinology, Diabetes, and Metabolism)  Katherine Danielson MD as Assigned Surgical Provider    The following health maintenance items are reviewed in Epic and correct as of today:  Health Maintenance   Topic Date Due    MICROALBUMIN  10/24/2020    MEDICARE ANNUAL WELLNESS VISIT  06/08/2023    AORTIC ANEURYSM SCREENING (SYSTEM ASSIGNED)  Never done    INFLUENZA VACCINE (1) 09/01/2023    BMP  11/21/2023    LIPID  11/21/2023    ANNUAL REVIEW OF HM ORDERS  11/21/2023    A1C  02/14/2024    EYE EXAM  02/17/2024    DIABETIC FOOT EXAM  02/21/2024    COLORECTAL CANCER SCREENING  03/03/2024    FALL RISK ASSESSMENT  08/14/2024    ADVANCE CARE PLANNING  04/07/2025    DTAP/TDAP/TD IMMUNIZATION (3 - Td or Tdap) 10/24/2029    HEPATITIS C SCREENING  Completed    HIV SCREENING  Completed    PHQ-2 (once per calendar year)  Completed    Pneumococcal Vaccine: 65+ Years  Completed    ZOSTER IMMUNIZATION  Completed    COVID-19 Vaccine  Completed    IPV IMMUNIZATION  Aged Out    MENINGITIS IMMUNIZATION  Aged Out       Review of Systems   Constitutional:  Negative for chills and fever.   HENT:  Negative for congestion, ear pain, hearing loss and sore throat.    Eyes:  Negative for pain and  "visual disturbance.   Respiratory:  Negative for cough and shortness of breath.    Cardiovascular:  Negative for chest pain, palpitations and peripheral edema.   Gastrointestinal:  Negative for abdominal pain, constipation, diarrhea, heartburn, hematochezia and nausea.   Genitourinary:  Positive for impotence. Negative for dysuria, frequency, genital sores, hematuria, penile discharge and urgency.   Musculoskeletal:  Negative for arthralgias, joint swelling and myalgias.   Skin:  Negative for rash.   Neurological:  Negative for dizziness, weakness, headaches and paresthesias.   Psychiatric/Behavioral:  Negative for mood changes. The patient is not nervous/anxious.        OBJECTIVE:   /70 (BP Location: Left arm, Patient Position: Chair, Cuff Size: Adult Large)   Pulse 82   Temp 97.5  F (36.4  C) (Tympanic)   Resp 16   Ht 1.822 m (5' 11.75\")   Wt 105.2 kg (232 lb)   SpO2 99%   BMI 31.68 kg/m   Estimated body mass index is 31.68 kg/m  as calculated from the following:    Height as of this encounter: 1.822 m (5' 11.75\").    Weight as of this encounter: 105.2 kg (232 lb).  Physical Exam  GENERAL: healthy, alert and no distress  EYES: Eyes grossly normal to inspection, PERRL and conjunctivae and sclerae normal  HENT: ear canals and TM's normal, nose and mouth without ulcers or lesions  NECK: no adenopathy, no asymmetry, masses, or scars and thyroid normal to palpation  RESP: lungs clear to auscultation - no rales, rhonchi or wheezes  CV: regular rate and rhythm, normal S1 S2, no S3 or S4, no murmur, click or rub, no peripheral edema and peripheral pulses strong  ABDOMEN: soft, nontender, no hepatosplenomegaly, no masses and bowel sounds normal   (male): normal male genitalia without lesions or urethral discharge, no hernia  MS: no gross musculoskeletal defects noted, no edema  SKIN: no suspicious lesions or rashes  NEURO: Normal strength and tone, mentation intact and speech normal. Reflexes normal and " symmetric in the upper and lower extremities.   PSYCH: mentation appears normal, affect normal/bright    Results for orders placed or performed in visit on 08/14/23 (from the past 24 hour(s))   Hemoglobin A1c   Result Value Ref Range    Hemoglobin A1C 7.5 (H) 0.0 - 5.6 %      Lab Results   Component Value Date    A1C 7.5 08/14/2023    A1C 7.1 02/21/2023    A1C 8.0 11/21/2022    A1C 7.7 12/20/2021    A1C 9.2 08/11/2021    A1C 7.7 01/25/2021    A1C 8.1 09/01/2020    A1C 8.0 04/02/2020    A1C 7.3 10/24/2019    A1C 7.5 03/15/2019        ASSESSMENT / PLAN:   (Z00.00) Encounter for Medicare annual wellness exam  (primary encounter diagnosis)  Comment: Negative screening exam; up-to-date on preventive services.   Plan: Abdominal Aortic Aneurysm Screening/Tracking        follow up in 1 year.    (E11.9) Type 2 diabetes mellitus without complication, without long-term current use of insulin (H)  Comment: well controlled, although the patient is disappointed in the increase in his HgbA1c.   Plan: HgbA1c, metFORMIN (GLUCOPHAGE XR) 500 MG 24 hr tablet,         Dulaglutide (TRULICITY) 3 MG/0.5ML SOPN        I reviewed the side effected associated with GLP-1 agonists. The patient is interested in trying a higher dose with Trulicity, if he will tolerate the dose. Return in about 6 months (around 2/14/2024) for diabetes, cholesterol, blood pressure check. (Sooner if he has issues with the Trulicity).      (E78.5) Hyperlipidemia LDL goal <100  Comment: historically at goal, unfortunately not tolerant of statins.   Plan: Lipid panel, ALT, niacin ER (NIASPAN) 500 MG CR tablet        Recheck lipids in 6-12 months     (I10) Essential hypertension with goal blood pressure less than 140/90  Comment: well controlled   Plan: Potassium, Creatinine, amLODIPine-benazepril (LOTREL) 5-20 MG capsule            (Z12.5) Prostate cancer screening  Comment: indications for screening discussed with the patient   Plan: PSA      (Z13.6) Screening for AAA  (abdominal aortic aneurysm)  Comment: Does not qualify.  Plan: Abdominal Aortic Aneurysm Screening/Tracking                 COUNSELING:  Reviewed preventive health counseling, as reflected in patient instructions  Special attention given to:       Consider AAA screening for ages 65-75 and smoking history       Regular exercise       Healthy diet/nutrition       Dental care       Prostate cancer screening        He reports that he has never smoked. He has never used smokeless tobacco.      Appropriate preventive services were discussed with this patient, including applicable screening as appropriate for cardiovascular disease, diabetes, osteopenia/osteoporosis, and glaucoma.  As appropriate for age/gender, discussed screening for colorectal cancer, prostate cancer, breast cancer, and cervical cancer. Checklist reviewing preventive services available has been given to the patient.    Reviewed patients plan of care and provided an AVS. The Basic Care Plan (routine screening as documented in Health Maintenance) for Ren meets the Care Plan requirement. This Care Plan has been established and reviewed with the Patient.          Humberto Humphrey MD  [facility]      This document serves as a record of the services and decisions personally performed and made by Dr. Humphrey. It was created on his behalf by Jung Recinos, a trained medical scribe. The creation of this document is based the provider's statements to the medical scribe.  Jung Recinos,  3:46 PM

## 2023-08-14 NOTE — PATIENT INSTRUCTIONS
At [facility], we strive to deliver an exceptional experience to you, every time we see you. If you receive a survey, please complete it as we do value your feedback.  If you have MyChart, you can expect to receive results automatically within 24 hours of their completion.  Your provider will send a note interpreting your results as well.   If you do not have MyChart, you should receive your results in about a week by mail.    Your care team:                            Family Medicine Internal Medicine   MD Mendoza Gates, MD Jimmie Morales MD Katya Belousova, PAARPAN Brown, MD Pediatrics   Zan Velazquez, PAARPAN Bryant, MD Kanwal Warren CNP   KOMAL Amado CNP, MD Charanya Pasupathi, MD Kathleen Widmer, NP coming October 2023 Same-Day (No follow up visit)    RAMONITA Pruitt PA coming Oct 2023     Clinic hours: Monday - Thursday 7 am-6 pm; Fridays 7 am-5 pm.   Urgent care: Monday - Friday 10 am- 8 pm; Saturday and Sunday 9 am-5 pm.    Clinic: [phone]       [facility]: Monday - Thursday 8 am - 7 pm; Friday 8 am - 6 pm  [facility]: [phone]         Patient Education   Personalized Prevention Plan  You are due for the preventive services outlined below.  Your care team is available to assist you in scheduling these services.  If you have already completed any of these items, please share that information with your care team to update in your medical record.  Health Maintenance Due   Topic Date Due     Kidney Microalbumin Urine Test  10/24/2020     AORTIC ANEURYSM SCREENING (SYSTEM ASSIGNED)  Never done     Learning About Being Physically Active  What is physical activity?     Being physically active means doing any kind of activity that gets your body moving.  The types of physical  "activity that can help you get fit and stay healthy include:  Aerobic or \"cardio\" activities. These make your heart beat faster and make you breathe harder, such as brisk walking, riding a bike, or running. They strengthen your heart and lungs and build up your endurance.  Strength training activities. These make your muscles work against, or \"resist,\" something. Examples include lifting weights or doing push-ups. These activities help tone and strengthen your muscles and bones.  Stretches. These let you move your joints and muscles through their full range of motion. Stretching helps you be more flexible.  Reaching a balance between these three types of physical activity is important because each one contributes to your overall fitness.  What are the benefits of being active?  Being active is one of the best things you can do for your health. It helps you to:  Feel stronger and have more energy to do all the things you like to do.  Focus better at school or work.  Feel, think, and sleep better.  Reach and stay at a healthy weight.  Lose fat and build lean muscle.  Lower your risk for serious health problems, including diabetes, heart attack, high blood pressure, and some cancers.  Keep your heart, lungs, bones, muscles, and joints strong and healthy.  How can you make being active part of your life?  Start slowly. Make it your long-term goal to get at least 30 minutes of exercise on most days of the week. Walking is a good choice. You also may want to do other activities, such as running, swimming, cycling, or playing tennis or team sports.  Pick activities that you like--ones that make your heart beat faster, your muscles stronger, and your muscles and joints more flexible. If you find more than one thing you like doing, do them all. You don't have to do the same thing every day.  Get your heart pumping every day. Any activity that makes your heart beat faster and keeps it at that rate for a while counts.  Here " "are some great ways to get your heart beating faster:  Go for a brisk walk, run, or bike ride.  Go for a hike or swim.  Go in-line skating.  Play a game of touch football, basketball, or soccer.  Ride a bike.  Play tennis or racquetball.  Climb stairs.  Even some household chores can be aerobic--just do them at a faster pace. Vacuuming, raking or mowing the lawn, sweeping the garage, and washing and waxing the car all can help get your heart rate up.  Strengthen your muscles during the week. You don't have to lift heavy weights or grow big, bulky muscles to get stronger. Doing a few simple activities that make your muscles work against, or \"resist,\" something can help you get stronger.  For example, you can:  Do push-ups or sit-ups, which use your own body weight as resistance.  Lift weights or dumbbells or use stretch bands at home or in a gym or community center.  Stretch your muscles often. Stretching will help you as you become more active. It can help you stay flexible, loosen tight muscles, and avoid injury. It can also help improve your balance and posture and can be a great way to relax.  Be sure to stretch the muscles you'll be using when you work out. It's best to warm your muscles slightly before you stretch them. Walk or do some other light aerobic activity for a few minutes, and then start stretching.  When you stretch your muscles:  Do it slowly. Stretching is not about going fast or making sudden movements.  Don't push or bounce during a stretch.  Hold each stretch for at least 15 to 30 seconds, if you can. You should feel a stretch in the muscle, but not pain.  Breathe out as you do the stretch. Then breathe in as you hold the stretch. Don't hold your breath.  If you're worried about how more activity might affect your health, have a checkup before you start. Follow any special advice your doctor gives you for getting a smart start.  Where can you learn more?  Go to " "https://www.healthRayn.net/patiented  Enter W332 in the search box to learn more about \"Learning About Being Physically Active.\"  Current as of: October 10, 2022               Content Version: 13.7    7736-7627 [employer].   Care instructions adapted under license by your healthcare professional. If you have questions about a medical condition or this instruction, always ask your healthcare professional. [employer] disclaims any warranty or liability for your use of this information.         "

## 2023-08-15 LAB
CREAT UR-MCNC: 135 MG/DL
MICROALBUMIN UR-MCNC: <12 MG/L
MICROALBUMIN/CREAT UR: NORMAL MG/G{CREAT}

## 2023-11-12 DIAGNOSIS — E11.65 TYPE 2 DIABETES MELLITUS WITH HYPERGLYCEMIA, WITHOUT LONG-TERM CURRENT USE OF INSULIN (H): ICD-10-CM

## 2023-11-13 RX ORDER — DULAGLUTIDE 1.5 MG/.5ML
1.5 INJECTION, SOLUTION SUBCUTANEOUS
Qty: 6 ML | Refills: 1 | Status: SHIPPED | OUTPATIENT
Start: 2023-11-13 | End: 2024-03-18

## 2024-03-12 DIAGNOSIS — I10 ESSENTIAL HYPERTENSION WITH GOAL BLOOD PRESSURE LESS THAN 140/90: ICD-10-CM

## 2024-03-12 DIAGNOSIS — R60.9 DEPENDENT EDEMA: ICD-10-CM

## 2024-03-12 DIAGNOSIS — E11.9 TYPE 2 DIABETES MELLITUS WITHOUT COMPLICATION, WITHOUT LONG-TERM CURRENT USE OF INSULIN (H): ICD-10-CM

## 2024-03-12 RX ORDER — HYDROCHLOROTHIAZIDE 12.5 MG/1
25 TABLET ORAL DAILY
Qty: 180 TABLET | Refills: 1 | Status: SHIPPED | OUTPATIENT
Start: 2024-03-12 | End: 2024-08-15

## 2024-03-13 DIAGNOSIS — E11.9 TYPE 2 DIABETES MELLITUS WITHOUT COMPLICATION, WITHOUT LONG-TERM CURRENT USE OF INSULIN (H): ICD-10-CM

## 2024-03-13 DIAGNOSIS — E11.65 TYPE 2 DIABETES MELLITUS WITH HYPERGLYCEMIA, WITHOUT LONG-TERM CURRENT USE OF INSULIN (H): ICD-10-CM

## 2024-03-13 DIAGNOSIS — I10 ESSENTIAL HYPERTENSION WITH GOAL BLOOD PRESSURE LESS THAN 140/90: ICD-10-CM

## 2024-03-13 DIAGNOSIS — E78.5 HYPERLIPIDEMIA LDL GOAL <100: ICD-10-CM

## 2024-03-13 RX ORDER — METFORMIN HCL 500 MG
2000 TABLET, EXTENDED RELEASE 24 HR ORAL
Qty: 360 TABLET | Refills: 0 | Status: SHIPPED | OUTPATIENT
Start: 2024-03-13 | End: 2024-04-22

## 2024-03-13 NOTE — TELEPHONE ENCOUNTER
Called and spoke to the patient and he understands and will schedule this appt.  Jillian Jamil MA  [facility]   Primary Care

## 2024-03-13 NOTE — TELEPHONE ENCOUNTER
Medication Question or Refill    Contacts         Type Contact Phone/Fax    03/13/2024 03:19 PM CDT Phone (Incoming) Ren Farrar (Self) [phone] (M)            What medication are you calling about (include dose and sig)?:   amLODIPine-benazepril (LOTREL) 5-20 MG capsule   Dulaglutide (TRULICITY) 3 MG/0.5ML SOPN   niacin ER (NIASPAN) 500 MG CR tablet     Preferred Pharmacy:    [facility]/pharmacy #[id number] [facility] [address] [address]  Phone: [phone] Fax: [phone]    Controlled Substance Agreement on file:   CSA -- Patient Level:    CSA: None found at the patient level.       Who prescribed the medication?: Dr Humphrey    Do you need a refill? Yes    Patient offered an appointment? No Patient has an appt scheduled for 4/26/2024    Do you have any questions or concerns?  No      Could we send this information to you in [facility] or would you prefer to receive a phone call?:   Patient would prefer a phone call   Okay to leave a detailed message?: Yes at Cell number on file:    Telephone Information:   Mobile [phone]     Jillian Jamil MA  [facility]   Primary Care

## 2024-03-14 RX ORDER — NIACIN 500 MG/1
2000 TABLET, EXTENDED RELEASE ORAL AT BEDTIME
Qty: 360 TABLET | Refills: 0 | Status: SHIPPED | OUTPATIENT
Start: 2024-03-14 | End: 2024-04-22

## 2024-03-14 RX ORDER — AMLODIPINE AND BENAZEPRIL HYDROCHLORIDE 5; 20 MG/1; MG/1
1 CAPSULE ORAL DAILY
Qty: 90 CAPSULE | Refills: 0 | Status: SHIPPED | OUTPATIENT
Start: 2024-03-14 | End: 2024-04-22

## 2024-03-14 RX ORDER — DULAGLUTIDE 3 MG/.5ML
3 INJECTION, SOLUTION SUBCUTANEOUS
Qty: 6 ML | Refills: 1 | Status: CANCELLED | OUTPATIENT
Start: 2024-03-14

## 2024-03-18 RX ORDER — DULAGLUTIDE 1.5 MG/.5ML
1.5 INJECTION, SOLUTION SUBCUTANEOUS
Qty: 6 ML | Refills: 1 | Status: SHIPPED | OUTPATIENT
Start: 2024-03-18 | End: 2024-04-22

## 2024-03-18 NOTE — TELEPHONE ENCOUNTER
Refilled 1.5 mg dose. Hasn't taken 3 mg yet, so no reason to change before discussion on 4/22.

## 2024-04-21 ENCOUNTER — HEALTH MAINTENANCE LETTER (OUTPATIENT)
Age: 64
End: 2024-04-21

## 2024-04-22 ENCOUNTER — OFFICE VISIT (OUTPATIENT)
Dept: FAMILY MEDICINE | Facility: CLINIC | Age: 64
End: 2024-04-22
Payer: COMMERCIAL

## 2024-04-22 VITALS
TEMPERATURE: 98.3 F | HEIGHT: 72 IN | DIASTOLIC BLOOD PRESSURE: 68 MMHG | RESPIRATION RATE: 16 BRPM | OXYGEN SATURATION: 98 % | HEART RATE: 80 BPM | BODY MASS INDEX: 32.1 KG/M2 | SYSTOLIC BLOOD PRESSURE: 124 MMHG | WEIGHT: 237 LBS

## 2024-04-22 DIAGNOSIS — E11.65 TYPE 2 DIABETES MELLITUS WITH HYPERGLYCEMIA, WITHOUT LONG-TERM CURRENT USE OF INSULIN (H): Primary | ICD-10-CM

## 2024-04-22 DIAGNOSIS — E78.5 HYPERLIPIDEMIA LDL GOAL <100: ICD-10-CM

## 2024-04-22 DIAGNOSIS — E11.9 TYPE 2 DIABETES MELLITUS WITHOUT COMPLICATION, WITHOUT LONG-TERM CURRENT USE OF INSULIN (H): Primary | ICD-10-CM

## 2024-04-22 DIAGNOSIS — Z12.11 COLON CANCER SCREENING: ICD-10-CM

## 2024-04-22 DIAGNOSIS — I10 ESSENTIAL HYPERTENSION WITH GOAL BLOOD PRESSURE LESS THAN 140/90: ICD-10-CM

## 2024-04-22 DIAGNOSIS — Z23 NEED FOR VACCINATION: ICD-10-CM

## 2024-04-22 LAB — HBA1C MFR BLD: 8.5 % (ref 0–5.6)

## 2024-04-22 PROCEDURE — 91320 SARSCV2 VAC 30MCG TRS-SUC IM: CPT | Performed by: FAMILY MEDICINE

## 2024-04-22 PROCEDURE — 36415 COLL VENOUS BLD VENIPUNCTURE: CPT | Performed by: FAMILY MEDICINE

## 2024-04-22 PROCEDURE — 84460 ALANINE AMINO (ALT) (SGPT): CPT | Performed by: FAMILY MEDICINE

## 2024-04-22 PROCEDURE — 99214 OFFICE O/P EST MOD 30 MIN: CPT | Mod: 25 | Performed by: FAMILY MEDICINE

## 2024-04-22 PROCEDURE — 82570 ASSAY OF URINE CREATININE: CPT | Performed by: FAMILY MEDICINE

## 2024-04-22 PROCEDURE — 83036 HEMOGLOBIN GLYCOSYLATED A1C: CPT | Performed by: FAMILY MEDICINE

## 2024-04-22 PROCEDURE — 90678 RSV VACC PREF BIVALENT IM: CPT | Performed by: FAMILY MEDICINE

## 2024-04-22 PROCEDURE — 99207 PR FOOT EXAM NO CHARGE: CPT | Performed by: FAMILY MEDICINE

## 2024-04-22 PROCEDURE — 80048 BASIC METABOLIC PNL TOTAL CA: CPT | Performed by: FAMILY MEDICINE

## 2024-04-22 PROCEDURE — 90471 IMMUNIZATION ADMIN: CPT | Performed by: FAMILY MEDICINE

## 2024-04-22 PROCEDURE — 80061 LIPID PANEL: CPT | Performed by: FAMILY MEDICINE

## 2024-04-22 PROCEDURE — 82043 UR ALBUMIN QUANTITATIVE: CPT | Performed by: FAMILY MEDICINE

## 2024-04-22 PROCEDURE — 90480 ADMN SARSCOV2 VAC 1/ONLY CMP: CPT | Performed by: FAMILY MEDICINE

## 2024-04-22 RX ORDER — DULAGLUTIDE 3 MG/.5ML
3 INJECTION, SOLUTION SUBCUTANEOUS
Qty: 6 ML | Refills: 1 | Status: SHIPPED | OUTPATIENT
Start: 2024-04-22 | End: 2024-08-25

## 2024-04-22 RX ORDER — AMLODIPINE AND BENAZEPRIL HYDROCHLORIDE 5; 20 MG/1; MG/1
1 CAPSULE ORAL DAILY
Qty: 90 CAPSULE | Refills: 1 | Status: SHIPPED | OUTPATIENT
Start: 2024-04-22 | End: 2024-08-29

## 2024-04-22 RX ORDER — DULAGLUTIDE 1.5 MG/.5ML
1.5 INJECTION, SOLUTION SUBCUTANEOUS
Qty: 6 ML | Refills: 1 | Status: SHIPPED | OUTPATIENT
Start: 2024-04-22 | End: 2024-08-29

## 2024-04-22 RX ORDER — NIACIN 500 MG/1
2000 TABLET, EXTENDED RELEASE ORAL AT BEDTIME
Qty: 360 TABLET | Refills: 1 | Status: SHIPPED | OUTPATIENT
Start: 2024-04-22 | End: 2024-08-29

## 2024-04-22 RX ORDER — METFORMIN HCL 500 MG
2000 TABLET, EXTENDED RELEASE 24 HR ORAL
Qty: 360 TABLET | Refills: 1 | Status: SHIPPED | OUTPATIENT
Start: 2024-04-22 | End: 2024-08-29

## 2024-04-22 NOTE — PROGRESS NOTES
Assessment & Plan     (E11.65) Type 2 diabetes mellitus with hyperglycemia, without long-term current use of insulin (H)  (primary encounter diagnosis)  Comment: Now uncontrolled due to lack of medication. He has been unable to refill Trulicity since his last fill about November. Of note, his pharmacy said his insurance did not cover the 3 mg dose, so he continued on 1.5 mg after our last visit.   Plan: HgbA1c, Albumin Random Urine, FOOT EXAM, metFORMIN (GLUCOPHAGE XR) 500 MG 24         hr tablet, dulaglutide (TRULICITY) 1.5 MG/0.5ML        pen, Dulaglutide (TRULICITY) 3 MG/0.5ML SOPN        Reattempt to fill Trulicity, preferably at 3 mg. May need a PA, may need a different GLP-1 agonist, which is fine as long as I know which one to prescribe. Return in about 3 months (around 7/22/2024) for diabetes.      (E78.5) Hyperlipidemia LDL goal <100  Comment: historically at goal.   Plan: Lipid panel, ALT, niacin ER (NIASPAN) 500 MG CR tablet        Recheck lipids in 6-12 months..     (I10) Essential hypertension with goal blood pressure less than 140/90  Comment: well controlled.   Plan: BMP, amLODIPine-benazepril (LOTREL) 5-20 MG capsule            (Z12.11) Colon cancer screening  Comment:   Plan: FIT      (Z23) Need for vaccination  Comment: Patient only feels like receiving 2 vaccines, so Influenza is deferred.   Plan: RSV VACCINE (ABRYSVO), COVID-19 12+ (2023-24)         (PFIZER)                    Richard Mcclure is a 63 year old, presenting for the following health issues:  Diabetes, Hypertension, and Lipids        4/22/2024     3:32 PM   Additional Questions   Roomed by justus   Accompanied by self         4/22/2024     3:32 PM   Patient Reported Additional Medications   Patient reports taking the following new medications no     Via the Health Maintenance questionnaire, the patient has reported the following services have been completed -Eye Exam, this information has been sent to the abstraction team.  History  of Present Illness       Diabetes:   He presents for follow up of diabetes.  He is checking home blood glucose two times daily.   He checks blood glucose before and after meals.  Blood glucose is sometimes over 200 and never under 70. He is aware of hypoglycemia symptoms including shakiness.   He is concerned about other.    He is not experiencing numbness or burning in feet, excessive thirst, blurry vision, weight changes or redness, sores or blisters on feet. The patient has had a diabetic eye exam in the last 12 months. Eye exam performed on June 2023. Location of last eye exam [facility].        Hyperlipidemia:  He presents for follow up of hyperlipidemia.   He is taking medication to lower cholesterol. He is not having myalgia or other side effects to statin medications.    Hypertension: He presents for follow up of hypertension.  He does check blood pressure  regularly outside of the clinic. Outpatient blood pressures have not been over 140/90. He follows a low salt diet.     He eats 0-1 servings of fruits and vegetables daily.He consumes 0 sweetened beverage(s) daily.He exercises with enough effort to increase his heart rate 20 to 29 minutes per day.  He exercises with enough effort to increase his heart rate 4 days per week. He is missing 2 dose(s) of medications per week.     Patient reports his home blood glucose readings are around 140 when he checks it. He notes he has not been taking his Trulicity for about 6 months now due to shortage and insurance issues.       Review of Systems       Objective    /68 (BP Location: Right arm, Patient Position: Chair, Cuff Size: Adult Large)   Pulse 80   Temp 98.3  F (36.8  C) (Oral)   Resp 16   Ht 1.829 m (6')   Wt 107.5 kg (237 lb)   SpO2 98%   BMI 32.14 kg/m    Body mass index is 32.14 kg/m .  Physical Exam   GENERAL: healthy, alert and no distress  EYES: Eyes grossly normal to inspection, PERRL, EOMI, sclerae white and conjunctivae normal  RESP:  lungs clear to auscultation - no crackles or wheezes, no areas of dullness, no tachypnea  CV: Heart regular rate and rhythm without murmur, click or rub. No peripheral edema and peripheral pulses strong  MS: no gross musculoskeletal defects noted, no edema  SKIN: no suspicious lesions or rashes to visible skin  NEURO: Normal strength and tone, sensory exam grossly normal, mentation intact, oriented times 3 and cranial nerves 2-12 intact  PSYCH: mentation appears normal, affect normal/bright   Diabetic foot exam: normal DP and PT pulses, no trophic changes or ulcerative lesions, normal sensory exam, and normal monofilament exam     Results for orders placed or performed in visit on 04/22/24 (from the past 24 hour(s))   Hemoglobin A1c   Result Value Ref Range    Hemoglobin A1C 8.5 (H) 0.0 - 5.6 %     Lab Results   Component Value Date    A1C 8.5 04/22/2024    A1C 7.5 08/14/2023    A1C 7.1 02/21/2023    A1C 8.0 11/21/2022    A1C 7.7 12/20/2021    A1C 7.7 01/25/2021    A1C 8.1 09/01/2020    A1C 8.0 04/02/2020    A1C 7.3 10/24/2019    A1C 7.5 03/15/2019            Signed Electronically by: Humberto Humphrey MD      The information in this document, created by the medical scribe for me, accurately reflects the services I personally performed and the decisions made by me. I have reviewed and approved this document for accuracy prior to signature.  Jung Recinos

## 2024-04-23 LAB
ALT SERPL W P-5'-P-CCNC: 24 U/L (ref 0–70)
ANION GAP SERPL CALCULATED.3IONS-SCNC: 13 MMOL/L (ref 7–15)
BUN SERPL-MCNC: 9.6 MG/DL (ref 8–23)
CALCIUM SERPL-MCNC: 9.8 MG/DL (ref 8.8–10.2)
CHLORIDE SERPL-SCNC: 100 MMOL/L (ref 98–107)
CHOLEST SERPL-MCNC: 212 MG/DL
CREAT SERPL-MCNC: 0.87 MG/DL (ref 0.67–1.17)
CREAT UR-MCNC: 24.1 MG/DL
DEPRECATED HCO3 PLAS-SCNC: 27 MMOL/L (ref 22–29)
EGFRCR SERPLBLD CKD-EPI 2021: >90 ML/MIN/1.73M2
FASTING STATUS PATIENT QL REPORTED: YES
GLUCOSE SERPL-MCNC: 162 MG/DL (ref 70–99)
HDLC SERPL-MCNC: 70 MG/DL
LDLC SERPL CALC-MCNC: 120 MG/DL
MICROALBUMIN UR-MCNC: <12 MG/L
MICROALBUMIN/CREAT UR: NORMAL MG/G{CREAT}
NONHDLC SERPL-MCNC: 142 MG/DL
POTASSIUM SERPL-SCNC: 4.1 MMOL/L (ref 3.4–5.3)
SODIUM SERPL-SCNC: 140 MMOL/L (ref 135–145)
TRIGL SERPL-MCNC: 111 MG/DL

## 2024-07-11 ENCOUNTER — TELEPHONE (OUTPATIENT)
Dept: FAMILY MEDICINE | Facility: CLINIC | Age: 64
End: 2024-07-11
Payer: COMMERCIAL

## 2024-07-11 NOTE — TELEPHONE ENCOUNTER
Patient Quality Outreach    Patient is due for the following:   Diabetes -  Eye Exam    Next Steps:   Schedule a office visit for Eye Exam     Type of outreach:    Sent zulily message.    Next Steps:  Reach out within 90 days via zulily.    Max number of attempts reached: Yes. Will try again in 90 days if patient still on fail list.    Questions for provider review:    None           Isabelle Valentin MA

## 2024-08-06 ENCOUNTER — LAB (OUTPATIENT)
Dept: LAB | Facility: CLINIC | Age: 64
End: 2024-08-06

## 2024-08-06 DIAGNOSIS — Z12.11 SPECIAL SCREENING FOR MALIGNANT NEOPLASMS, COLON: Primary | ICD-10-CM

## 2024-08-06 PROCEDURE — 82274 ASSAY TEST FOR BLOOD FECAL: CPT | Performed by: FAMILY MEDICINE

## 2024-08-09 LAB — HEMOCCULT STL QL IA: NEGATIVE

## 2024-08-15 ENCOUNTER — MYC REFILL (OUTPATIENT)
Dept: OPHTHALMOLOGY | Facility: CLINIC | Age: 64
End: 2024-08-15
Payer: COMMERCIAL

## 2024-08-15 DIAGNOSIS — H40.1131 PRIMARY OPEN ANGLE GLAUCOMA OF BOTH EYES, MILD STAGE: ICD-10-CM

## 2024-08-15 RX ORDER — LATANOPROST 50 UG/ML
1 SOLUTION/ DROPS OPHTHALMIC AT BEDTIME
Qty: 7.5 ML | Refills: 0 | Status: CANCELLED | OUTPATIENT
Start: 2024-08-15

## 2024-08-16 NOTE — TELEPHONE ENCOUNTER
A phone message was left earlier today requesting the patient to call and schedule an appointment with Dr. Danielson.  I also sent the patient a [employer] message.

## 2024-08-16 NOTE — TELEPHONE ENCOUNTER
Patient is requesting refills of Latanoprost.  Last appointment was February of 2023.  No future appointments scheduled.  Attempt will be made to contact the patient and schedule an appointment prior to approving any refills.

## 2024-08-29 ENCOUNTER — OFFICE VISIT (OUTPATIENT)
Dept: FAMILY MEDICINE | Facility: CLINIC | Age: 64
End: 2024-08-29
Attending: FAMILY MEDICINE
Payer: COMMERCIAL

## 2024-08-29 VITALS
HEIGHT: 71 IN | OXYGEN SATURATION: 99 % | HEART RATE: 79 BPM | DIASTOLIC BLOOD PRESSURE: 76 MMHG | TEMPERATURE: 97.6 F | SYSTOLIC BLOOD PRESSURE: 128 MMHG | WEIGHT: 235.6 LBS | BODY MASS INDEX: 32.98 KG/M2 | RESPIRATION RATE: 16 BRPM

## 2024-08-29 DIAGNOSIS — I10 ESSENTIAL HYPERTENSION WITH GOAL BLOOD PRESSURE LESS THAN 140/90: ICD-10-CM

## 2024-08-29 DIAGNOSIS — Z00.00 ENCOUNTER FOR MEDICARE ANNUAL WELLNESS EXAM: ICD-10-CM

## 2024-08-29 DIAGNOSIS — Z00.00 ROUTINE GENERAL MEDICAL EXAMINATION AT A HEALTH CARE FACILITY: Primary | ICD-10-CM

## 2024-08-29 DIAGNOSIS — Z12.5 PROSTATE CANCER SCREENING: ICD-10-CM

## 2024-08-29 DIAGNOSIS — E11.65 TYPE 2 DIABETES MELLITUS WITH HYPERGLYCEMIA, WITHOUT LONG-TERM CURRENT USE OF INSULIN (H): Primary | ICD-10-CM

## 2024-08-29 DIAGNOSIS — E78.5 HYPERLIPIDEMIA LDL GOAL <100: ICD-10-CM

## 2024-08-29 DIAGNOSIS — E11.65 TYPE 2 DIABETES MELLITUS WITH HYPERGLYCEMIA, WITHOUT LONG-TERM CURRENT USE OF INSULIN (H): ICD-10-CM

## 2024-08-29 DIAGNOSIS — R60.9 DEPENDENT EDEMA: ICD-10-CM

## 2024-08-29 LAB
GLUCOSE BLD-MCNC: 109 MG/DL (ref 60–99)
HBA1C MFR BLD: 8.5 % (ref 0–5.6)

## 2024-08-29 PROCEDURE — 36415 COLL VENOUS BLD VENIPUNCTURE: CPT | Performed by: FAMILY MEDICINE

## 2024-08-29 PROCEDURE — 99397 PER PM REEVAL EST PAT 65+ YR: CPT | Performed by: FAMILY MEDICINE

## 2024-08-29 PROCEDURE — 99214 OFFICE O/P EST MOD 30 MIN: CPT | Mod: 25 | Performed by: FAMILY MEDICINE

## 2024-08-29 PROCEDURE — 82947 ASSAY GLUCOSE BLOOD QUANT: CPT | Performed by: FAMILY MEDICINE

## 2024-08-29 PROCEDURE — 83036 HEMOGLOBIN GLYCOSYLATED A1C: CPT | Performed by: FAMILY MEDICINE

## 2024-08-29 PROCEDURE — G0103 PSA SCREENING: HCPCS | Performed by: FAMILY MEDICINE

## 2024-08-29 RX ORDER — NIACIN 500 MG/1
2000 TABLET, EXTENDED RELEASE ORAL AT BEDTIME
Qty: 360 TABLET | Refills: 1 | Status: SHIPPED | OUTPATIENT
Start: 2024-08-29

## 2024-08-29 RX ORDER — DULAGLUTIDE 1.5 MG/.5ML
1.5 INJECTION, SOLUTION SUBCUTANEOUS
Qty: 6 ML | Refills: 1 | Status: SHIPPED | OUTPATIENT
Start: 2024-08-29

## 2024-08-29 RX ORDER — AMLODIPINE AND BENAZEPRIL HYDROCHLORIDE 5; 20 MG/1; MG/1
1 CAPSULE ORAL DAILY
Qty: 90 CAPSULE | Refills: 1 | Status: SHIPPED | OUTPATIENT
Start: 2024-08-29

## 2024-08-29 RX ORDER — METFORMIN HCL 500 MG
2000 TABLET, EXTENDED RELEASE 24 HR ORAL
Qty: 360 TABLET | Refills: 1 | Status: SHIPPED | OUTPATIENT
Start: 2024-08-29

## 2024-08-29 RX ORDER — HYDROCHLOROTHIAZIDE 12.5 MG/1
25 TABLET ORAL DAILY
Qty: 180 TABLET | Refills: 0 | Status: SHIPPED | OUTPATIENT
Start: 2024-08-29

## 2024-08-29 SDOH — HEALTH STABILITY: PHYSICAL HEALTH: ON AVERAGE, HOW MANY DAYS PER WEEK DO YOU ENGAGE IN MODERATE TO STRENUOUS EXERCISE (LIKE A BRISK WALK)?: 3 DAYS

## 2024-08-29 ASSESSMENT — PAIN SCALES - GENERAL: PAINLEVEL: NO PAIN (0)

## 2024-08-29 ASSESSMENT — SOCIAL DETERMINANTS OF HEALTH (SDOH): HOW OFTEN DO YOU GET TOGETHER WITH FRIENDS OR RELATIVES?: MORE THAN THREE TIMES A WEEK

## 2024-08-29 NOTE — PATIENT INSTRUCTIONS
Patient Education   Preventive Care Advice   This is general advice given by our system to help you stay healthy. However, your care team may have specific advice just for you. Please talk to your care team about your preventive care needs.  Nutrition  Eat 5 or more servings of fruits and vegetables each day.  Try wheat bread, brown rice and whole grain pasta (instead of white bread, rice, and pasta).  Get enough calcium and vitamin D. Check the label on foods and aim for 100% of the RDA (recommended daily allowance).  Lifestyle  Aerobic exercise for at least 150 minutes per week (40+ minutes per day, 4-6 days per week). This will help you control your weight and prevent disease.   Do muscle strengthening activities 2 days a week. These help control your weight and prevent disease.  No smoking.  Wear sunscreen to prevent skin cancer.  Have a dental exam and cleaning every 6 months.  Yearly exams  See your health care team every year to talk about:  Any changes in your health.  Any medicines your care team has prescribed.  Preventive care, family planning, and ways to prevent chronic diseases.  Shots (vaccines)   HPV shots (up to age 26), if you've never had them before.  Hepatitis B shots (up to age 59), if you've never had them before.  COVID-19 shot: Get this shot when it's due.  Flu shot: Get a flu shot every year.  Tetanus shot: Get a tetanus shot every 10 years.  Pneumococcal, hepatitis A, and RSV shots: Ask your care team if you need these based on your risk.  Shingles shot (for age 50 and up)  General health tests  Diabetes screening:  Starting at age 35, Get screened for diabetes at least every 3 years.  If you are younger than age 35, ask your care team if you should be screened for diabetes.  Cholesterol test: At age 39, start having a cholesterol test every 5 years, or more often if advised.  Bone density scan (DEXA): At age 50, ask your care team if you should have this scan for osteoporosis (brittle  bones).  Hepatitis C: Get tested at least once in your life.  STIs (sexually transmitted infections)  Before age 24: Ask your care team if you should be screened for STIs.  After age 24: Get screened for STIs if you're at risk. You are at risk for STIs (including HIV) if:  You are sexually active with more than one person.  You don't use condoms every time.  You or a partner was diagnosed with a sexually transmitted infection.  If you are at risk for HIV, ask about PrEP medicine to prevent HIV.  Get tested for HIV at least once in your life, whether you are at risk for HIV or not.  Cancer screening tests  Cervical cancer screening: If you have a cervix, begin getting regular cervical cancer screening tests starting at age 21.  Breast cancer scan (mammogram): If you've ever had breasts, begin having regular mammograms starting at age 40. This is a scan to check for breast cancer.  Colon cancer screening: It is important to start screening for colon cancer at age 45.  Have a colonoscopy test every 10 years (or more often if you're at risk) Or, ask your provider about stool tests like a FIT test every year or Cologuard test every 3 years.  To learn more about your testing options, visit:   .  For help making a decision, visit:   https://bit.ly/wz35193.  Prostate cancer screening test: If you have a prostate, ask your care team if a prostate cancer screening test (PSA) at age 55 is right for you.  Lung cancer screening: If you are a current or former smoker ages 50 to 80, ask your care team if ongoing lung cancer screenings are right for you.  For informational purposes only. Not to replace the advice of your health care provider. Copyright   2023 [employer]. All rights reserved. Clinically reviewed by the [facility]itions Program. Clip [id number] - REV 01/24.

## 2024-08-29 NOTE — PROGRESS NOTES
"Preventive Care Visit  [facility]  Humberto Humphrey MD, Family Medicine  Aug 29, 2024      Assessment & Plan     (Z00.00) Routine general medical examination at a health care facility  (primary encounter diagnosis)  (Z00.00) Encounter for Medicare annual wellness exam  Comment: Negative screening exam; up-to-date on preventive services.   Plan: Follow-up in 1 year    (E11.65) Type 2 diabetes mellitus with hyperglycemia, without long-term current use of insulin (H)  Comment: Uncontrolled due to high glucose readings, as evidenced by his elevated hemoglobin A1c, which has not changed from last visit because he has not been able to fill the Trulicity prescription due to shortages  Plan: dulaglutide (TRULICITY) 1.5 MG/0.5ML pen,         metFORMIN (GLUCOPHAGE XR) 500 MG 24 hr tablet,         Med Therapy Management Referral        We will change pharmacies, but otherwise go with the same plan of working with [facility] to titrate the Trulicity dose. Return in about 3 months (around 11/29/2024) for diabetes.      (E78.5) Hyperlipidemia LDL goal <100  Comment: His LDL was not at goal on the last check (usually it is)  Plan: niacin ER (NIASPAN) 500 MG CR tablet        Recheck lipids in 6 to 12 months    (I10) Essential hypertension with goal blood pressure less than 140/90  Comment: Well-controlled  Plan: amLODIPine-benazepril (LOTREL) 5-20 MG capsule,        hydroCHLOROthiazide 12.5 MG tablet            (R60.9) Dependent edema  Comment:   Plan: hydroCHLOROthiazide 12.5 MG tablet            (Z12.5) Prostate cancer screening  Comment:   Plan: PSA            BMI  Estimated body mass index is 32.55 kg/m  as calculated from the following:    Height as of this encounter: 1.812 m (5' 11.34\").    Weight as of this encounter: 106.9 kg (235 lb 9.6 oz).   Weight management plan: Discussed healthy diet and exercise guidelines , as summarized in the AVS    Counseling  Appropriate preventive services were addressed " with this patient via screening, questionnaire, or discussion as appropriate for fall prevention, nutrition, physical activity, Tobacco-use cessation, social engagement, weight loss and cognition.  Checklist reviewing preventive services available has been given to the patient.          Richard Mcclure is a 64 year old, presenting for the following:  Physical        8/29/2024     3:46 PM   Additional Questions   Roomed by Myah   Accompanied by self        Via the Health Maintenance questionnaire, the patient has reported the following services have been completed , this information has been sent to the abstraction team.  Health Care Directive  Patient does not have a Health Care Directive or Living Will: Discussed advance care planning with patient; information given to patient to review.    HPI              8/29/2024   General Health   How would you rate your overall physical health? Good   Feel stress (tense, anxious, or unable to sleep) To some extent      (!) STRESS CONCERN      8/29/2024   Nutrition   Diet: Low salt    Low fat/cholesterol    Diabetic    Carbohydrate counting       Multiple values from one day are sorted in reverse-chronological order         8/29/2024   Exercise   Days per week of moderate/strenous exercise 3 days            8/29/2024   Social Factors   Frequency of gathering with friends or relatives More than three times a week   Worry food won't last until get money to buy more No   Food not last or not have enough money for food? No   Do you have housing? (Housing is defined as stable permanent housing and does not include staying ouside in a car, in a tent, in an abandoned building, in an overnight shelter, or couch-surfing.) Yes   Are you worried about losing your housing? No   Lack of transportation? No   Unable to get utilities (heat,electricity)? No            8/29/2024   Fall Risk   Fallen 2 or more times in the past year? No    No   Trouble with walking or balance? No    No        Multiple values from one day are sorted in reverse-chronological order          8/29/2024   Activities of Daily Living- Home Safety   Needs help with the following daily activites None of the above   Safety concerns in the home None of the above            8/29/2024   Dental   Dentist two times every year? Yes            8/29/2024   Hearing Screening   Hearing concerns? None of the above            8/29/2024   Driving Risk Screening   Patient/family members have concerns about driving No            8/29/2024   General Alertness/Fatigue Screening   Have you been more tired than usual lately? No            8/29/2024   Urinary Incontinence Screening   Bothered by leaking urine in past 6 months No               Today's PHQ-2 Score:       8/29/2024     3:18 PM   PHQ-2 ( 1999 [employer])   Q1: Little interest or pleasure in doing things 0   Q2: Feeling down, depressed or hopeless 0   PHQ-2 Score 0   Q1: Little interest or pleasure in doing things Not at all   Q2: Feeling down, depressed or hopeless Not at all   PHQ-2 Score 0           8/29/2024   Substance Use   Alcohol more than 3/day or more than 7/wk No   Do you have a current opioid prescription? No   How severe/bad is pain from 1 to 10? 2/10   Do you use any other substances recreationally? No        Social History     Tobacco Use    Smoking status: Never    Smokeless tobacco: Never   Substance Use Topics    Alcohol use: Yes     Comment: occassioanally    Drug use: No           8/29/2024   AAA Screening   Family history of Abdominal Aortic Aneurysm (AAA)? No      Last PSA:   PSA   Date Value Ref Range Status   11/01/2011 0.63 0 - 4 ug/L Final     Prostate Specific Antigen Screen   Date Value Ref Range Status   08/29/2024 0.61 0.00 - 4.50 ng/mL Final     ASCVD Risk   The 10-year ASCVD risk score (Lawrence MANRIQUE, et al., 2019) is: 27.3%    Values used to calculate the score:      Age: 64 years      Sex: Male      Is Non- : Yes      Diabetic:  "Yes      Tobacco smoker: No      Systolic Blood Pressure: 128 mmHg      Is BP treated: Yes      HDL Cholesterol: 70 mg/dL      Total Cholesterol: 212 mg/dL            Reviewed and updated as needed this visit by Provider   Tobacco   Meds  Problems  Med Hx  Surg Hx  Fam Hx  Soc Hx Sexual   Activity            Current providers sharing in care for this patient include:  Patient Care Team:  Humberto Humphrey MD as PCP - General  Humberto Humphrey MD as Assigned PCP  Osito Duffy MD as MD (Endocrinology, Diabetes, and Metabolism)    The following health maintenance items are reviewed in Epic and correct as of today:  Health Maintenance   Topic Date Due    EYE EXAM  06/01/2024    INFLUENZA VACCINE (1) 09/01/2024    COVID-19 Vaccine (7 - 2024-25 season) 09/01/2024    A1C  02/28/2025    BMP  04/22/2025    LIPID  04/22/2025    MICROALBUMIN  04/22/2025    DIABETIC FOOT EXAM  04/22/2025    ANNUAL REVIEW OF HM ORDERS  04/22/2025    COLORECTAL CANCER SCREENING  08/06/2025    MEDICARE ANNUAL WELLNESS VISIT  08/29/2025    FALL RISK ASSESSMENT  08/29/2025    ADVANCE CARE PLANNING  08/14/2028    DTAP/TDAP/TD IMMUNIZATION (3 - Td or Tdap) 10/24/2029    HEPATITIS C SCREENING  Completed    PHQ-2 (once per calendar year)  Completed    Pneumococcal Vaccine: 65+ Years  Completed    ZOSTER IMMUNIZATION  Completed    RSV VACCINE  Completed    HPV IMMUNIZATION  Aged Out    MENINGITIS IMMUNIZATION  Aged Out    RSV MONOCLONAL ANTIBODY  Aged Out            Objective    Exam  /76 (BP Location: Left arm, Patient Position: Sitting, Cuff Size: Adult Large)   Pulse 79   Temp 97.6  F (36.4  C) (Temporal)   Resp 16   Ht 1.812 m (5' 11.34\")   Wt 106.9 kg (235 lb 9.6 oz)   SpO2 99%   BMI 32.55 kg/m     Estimated body mass index is 32.55 kg/m  as calculated from the following:    Height as of this encounter: 1.812 m (5' 11.34\").    Weight as of this encounter: 106.9 kg (235 lb 9.6 oz).    Physical Exam  GENERAL: alert " and no distress  EYES: Eyes grossly normal to inspection, PERRL and conjunctivae and sclerae normal  HENT: ear canals and TM's normal, nose and mouth without ulcers or lesions  NECK: no adenopathy, no asymmetry, masses, or scars  RESP: lungs clear to auscultation - no rales, rhonchi or wheezes  CV: regular rate and rhythm, normal S1 S2, no S3 or S4, no murmur, click or rub, no peripheral edema  ABDOMEN: soft, nontender, no hepatosplenomegaly, no masses and bowel sounds normal   (male): normal male genitalia without lesions or urethral discharge, no hernia  MS: no gross musculoskeletal defects noted, no edema  SKIN: no suspicious lesions or rashes  NEURO: Normal strength and tone, mentation intact and speech normal  PSYCH: mentation appears normal, affect normal/bright          8/29/2024   Mini Cog   Clock Draw Score 2 Normal   3 Item Recall 3 objects recalled   Mini Cog Total Score 5          Lab Results   Component Value Date    A1C 8.5 08/29/2024    A1C 8.5 04/22/2024    A1C 7.5 08/14/2023    A1C 7.1 02/21/2023    A1C 8.0 11/21/2022    A1C 7.7 01/25/2021    A1C 8.1 09/01/2020    A1C 8.0 04/02/2020    A1C 7.3 10/24/2019    A1C 7.5 03/15/2019          Signed Electronically by: Humberto Humphrey MD

## 2024-08-30 LAB — PSA SERPL DL<=0.01 NG/ML-MCNC: 0.61 NG/ML (ref 0–4.5)

## 2024-09-20 DIAGNOSIS — E11.65 TYPE 2 DIABETES MELLITUS WITH HYPERGLYCEMIA, WITHOUT LONG-TERM CURRENT USE OF INSULIN (H): ICD-10-CM

## 2024-09-23 RX ORDER — BLOOD-GLUCOSE METER
EACH MISCELLANEOUS
Qty: 1 KIT | Refills: 0 | Status: SHIPPED | OUTPATIENT
Start: 2024-09-23

## 2024-10-01 ENCOUNTER — TELEPHONE (OUTPATIENT)
Dept: FAMILY MEDICINE | Facility: CLINIC | Age: 64
End: 2024-10-01
Payer: COMMERCIAL

## 2024-10-01 NOTE — TELEPHONE ENCOUNTER
[place] referral from: [facility] visit (referral by provider)    [place] referral outreach attempt #2 on October 1, 2024 at 10:19 AM      Outcome: Patient not reachable after several attempts, sent [employer] message    Use private pay for the carrier/Plan on the flowsheet      [employer] Message Sent    POLINA Lambert   [phone]

## 2025-03-08 ENCOUNTER — HEALTH MAINTENANCE LETTER (OUTPATIENT)
Age: 65
End: 2025-03-08

## 2025-07-30 ENCOUNTER — PATIENT OUTREACH (OUTPATIENT)
Dept: CARE COORDINATION | Facility: CLINIC | Age: 65
End: 2025-07-30
Payer: COMMERCIAL